# Patient Record
Sex: FEMALE | Race: WHITE | Employment: OTHER | URBAN - NONMETROPOLITAN AREA
[De-identification: names, ages, dates, MRNs, and addresses within clinical notes are randomized per-mention and may not be internally consistent; named-entity substitution may affect disease eponyms.]

---

## 2021-08-11 ENCOUNTER — HOSPITAL ENCOUNTER (EMERGENCY)
Age: 66
Discharge: HOME | End: 2021-08-11
Attending: INTERNAL MEDICINE
Payer: OTHER GOVERNMENT

## 2021-08-11 ENCOUNTER — APPOINTMENT (OUTPATIENT)
Dept: CT IMAGING | Age: 66
End: 2021-08-11
Attending: INTERNAL MEDICINE
Payer: OTHER GOVERNMENT

## 2021-08-11 ENCOUNTER — APPOINTMENT (OUTPATIENT)
Dept: GENERAL RADIOLOGY | Age: 66
End: 2021-08-11
Attending: INTERNAL MEDICINE
Payer: OTHER GOVERNMENT

## 2021-08-11 VITALS
TEMPERATURE: 98.4 F | RESPIRATION RATE: 20 BRPM | WEIGHT: 210 LBS | SYSTOLIC BLOOD PRESSURE: 145 MMHG | OXYGEN SATURATION: 96 % | HEIGHT: 67 IN | DIASTOLIC BLOOD PRESSURE: 71 MMHG | HEART RATE: 60 BPM | BODY MASS INDEX: 32.96 KG/M2

## 2021-08-11 DIAGNOSIS — U07.1 COVID-19 VIRUS RNA DETECTED: Primary | ICD-10-CM

## 2021-08-11 DIAGNOSIS — R91.8 LUNG MASS: ICD-10-CM

## 2021-08-11 LAB
ALBUMIN SERPL-MCNC: 4.4 G/DL (ref 3.5–4.7)
ALBUMIN/GLOB SERPL: 1.4 {RATIO}
ALP SERPL-CCNC: 72 U/L (ref 38–126)
ALT SERPL-CCNC: 31 U/L (ref 3–52)
ANION GAP SERPL CALC-SCNC: 13 MMOL/L
AST SERPL W P-5'-P-CCNC: 26 U/L (ref 14–74)
BASOPHILS # BLD: 0 K/UL (ref 0–0.1)
BASOPHILS NFR BLD: 0 % (ref 0–2)
BILIRUB DIRECT SERPL-MCNC: 0.2 MG/DL (ref 0–0.3)
BILIRUB SERPL-MCNC: 0.8 MG/DL (ref 0.2–1)
BUN SERPL-MCNC: 13 MG/DL (ref 9–21)
BUN/CREAT SERPL: 22
CA-I BLD-MCNC: 9.1 MG/DL (ref 8.5–10.5)
CHLORIDE SERPL-SCNC: 99 MMOL/L (ref 94–111)
CO2 SERPL-SCNC: 24 MMOL/L (ref 21–33)
COVID-19 RAPID TEST, COVR: DETECTED
CREAT SERPL-MCNC: 0.6 MG/DL (ref 0.7–1.2)
EOSINOPHIL # BLD: 0 K/UL (ref 0–0.4)
EOSINOPHIL NFR BLD: 0 % (ref 0–5)
ERYTHROCYTE [DISTWIDTH] IN BLOOD BY AUTOMATED COUNT: 12.7 % (ref 11.6–14.5)
FLUAV AG NPH QL IA: NEGATIVE
FLUBV AG NOSE QL IA: NEGATIVE
GLOBULIN SER CALC-MCNC: 3.2 G/DL
GLUCOSE SERPL-MCNC: 118 MG/DL (ref 70–110)
HCT VFR BLD AUTO: 44.4 % (ref 35–45)
HGB BLD-MCNC: 15.3 G/DL (ref 12–16)
IMM GRANULOCYTES # BLD AUTO: 0 K/UL
IMM GRANULOCYTES NFR BLD AUTO: 0 %
LACTATE SERPL-SCNC: 1 MMOL/L (ref 0.5–2)
LYMPHOCYTES # BLD: 1.8 K/UL (ref 0.9–3.6)
LYMPHOCYTES NFR BLD: 35 % (ref 21–52)
MCH RBC QN AUTO: 30.4 PG (ref 24–34)
MCHC RBC AUTO-ENTMCNC: 34.5 G/DL (ref 31–37)
MCV RBC AUTO: 88.1 FL (ref 74–97)
MONOCYTES # BLD: 0.3 K/UL (ref 0.05–1.2)
MONOCYTES NFR BLD: 7 % (ref 3–10)
NEUTS SEG # BLD: 2.9 K/UL (ref 1.8–8)
NEUTS SEG NFR BLD: 58 % (ref 40–73)
PLATELET # BLD AUTO: 177 K/UL (ref 135–420)
PMV BLD AUTO: 11.1 FL (ref 9.2–11.8)
POTASSIUM SERPL-SCNC: 3.2 MMOL/L (ref 3.2–5.1)
PROT SERPL-MCNC: 7.6 G/DL (ref 6.1–8.4)
RBC # BLD AUTO: 5.04 M/UL (ref 4.2–5.3)
SODIUM SERPL-SCNC: 136 MMOL/L (ref 135–145)
SPECIMEN SOURCE: ABNORMAL
WBC # BLD AUTO: 5.1 K/UL (ref 4.6–13.2)

## 2021-08-11 PROCEDURE — 71045 X-RAY EXAM CHEST 1 VIEW: CPT

## 2021-08-11 PROCEDURE — 87804 INFLUENZA ASSAY W/OPTIC: CPT

## 2021-08-11 PROCEDURE — 87635 SARS-COV-2 COVID-19 AMP PRB: CPT

## 2021-08-11 PROCEDURE — 83605 ASSAY OF LACTIC ACID: CPT

## 2021-08-11 PROCEDURE — 80048 BASIC METABOLIC PNL TOTAL CA: CPT

## 2021-08-11 PROCEDURE — 80076 HEPATIC FUNCTION PANEL: CPT

## 2021-08-11 PROCEDURE — 99284 EMERGENCY DEPT VISIT MOD MDM: CPT

## 2021-08-11 PROCEDURE — 71250 CT THORAX DX C-: CPT

## 2021-08-11 PROCEDURE — 85025 COMPLETE CBC W/AUTO DIFF WBC: CPT

## 2021-08-11 NOTE — DISCHARGE INSTRUCTIONS
FOLLOW COVID ISOLATION GUIDELINES  CALL DR CALHOUN FOR A VIRTUAL APPOINTMENT ASAP  RETURN TO THE ER FOR FEVER; SHORTNESS OF BREATH OR OTHER CONCERNING SYMPTOMS

## 2021-08-11 NOTE — ED PROVIDER NOTES
EMERGENCY DEPARTMENT HISTORY AND PHYSICAL EXAM      Date: 8/11/2021  Patient Name: Wendy Odonnell      History of Presenting Illness     Chief Complaint   Patient presents with    Flu Like Symptoms       History Provided By: Patient    HPI: Wendy Odonnell, 60 y.o. female with a past medical history significant No significant past medical history presents to the ED with cc of covid exposure. States that her son was diagnosed with covid on Saturday and she has intermittent fevers; cough; sob; n/v; myalgias; loss of taste/smell. There are no other complaints, changes, or physical findings at this time. PCP: Eli Galvan MD        Past History     Past Medical History:  Past Medical History:   Diagnosis Date    Arthritis        Past Surgical History:  Past Surgical History:   Procedure Laterality Date    HX HYSTERECTOMY         Family History:  History reviewed. No pertinent family history. Social History:  Social History     Tobacco Use    Smoking status: Current Every Day Smoker     Packs/day: 1.50    Smokeless tobacco: Never Used   Substance Use Topics    Alcohol use: Not Currently    Drug use: Never       Allergies: Allergies   Allergen Reactions    Iodine And Iodide Containing Products Hives         Review of Systems     Review of Systems   Constitutional: Positive for appetite change, chills and fever. HENT: Negative for trouble swallowing. Eyes: Negative for visual disturbance. Respiratory: Positive for cough and shortness of breath. Negative for wheezing and stridor. Cardiovascular: Negative for chest pain. Gastrointestinal: Positive for diarrhea and nausea. Negative for abdominal pain. Genitourinary: Negative for dysuria and flank pain. Musculoskeletal: Positive for myalgias. Negative for back pain. Skin: Negative for rash. Neurological: Negative for headaches. Psychiatric/Behavioral: Negative for confusion.        Physical Exam     Physical Exam  Vitals and nursing note reviewed. Constitutional:       General: She is not in acute distress. Appearance: She is well-developed. HENT:      Head: Normocephalic. Mouth/Throat:      Pharynx: No oropharyngeal exudate. Eyes:      General:         Right eye: No discharge. Pupils: Pupils are equal, round, and reactive to light. Neck:      Thyroid: No thyromegaly. Vascular: No JVD. Cardiovascular:      Rate and Rhythm: Normal rate and regular rhythm. Heart sounds: No murmur heard. No friction rub. No gallop. Pulmonary:      Effort: Pulmonary effort is normal. No respiratory distress. Breath sounds: Normal breath sounds. No wheezing or rales. Chest:      Chest wall: No tenderness. Abdominal:      General: Bowel sounds are normal. There is no distension. Palpations: Abdomen is soft. Tenderness: There is no abdominal tenderness. There is no guarding or rebound. Musculoskeletal:         General: Normal range of motion. Cervical back: Normal range of motion. Skin:     General: Skin is warm. Findings: No erythema. Neurological:      Mental Status: She is alert and oriented to person, place, and time.          Lab and Diagnostic Study Results     Labs -     Recent Results (from the past 12 hour(s))   INFLUENZA A & B AG (RAPID TEST)    Collection Time: 08/11/21 10:00 AM   Result Value Ref Range    Influenza A Antigen Negative Negative      Influenza B Antigen Negative Negative     METABOLIC PANEL, BASIC    Collection Time: 08/11/21 10:15 AM   Result Value Ref Range    Sodium 136 135 - 145 mmol/L    Potassium 3.2 3.2 - 5.1 mmol/L    Chloride 99 94 - 111 mmol/L    CO2 24 21 - 33 mmol/L    Anion gap 13 mmol/L    Glucose 118 (H) 70 - 110 mg/dL    BUN 13 9 - 21 mg/dL    Creatinine 0.60 (L) 0.70 - 1.20 mg/dL    BUN/Creatinine ratio 22      GFR est AA >60 ml/min/1.73m2    GFR est non-AA >60 ml/min/1.73m2    Calcium 9.1 8.5 - 10.5 mg/dL   CBC WITH AUTOMATED DIFF Collection Time: 08/11/21 10:15 AM   Result Value Ref Range    WBC 5.1 4.6 - 13.2 K/uL    RBC 5.04 4.20 - 5.30 M/uL    HGB 15.3 12.0 - 16.0 g/dL    HCT 44.4 35.0 - 45.0 %    MCV 88.1 74.0 - 97.0 FL    MCH 30.4 24.0 - 34.0 PG    MCHC 34.5 31.0 - 37.0 g/dL    RDW 12.7 11.6 - 14.5 %    PLATELET 366 285 - 334 K/uL    MPV 11.1 9.2 - 11.8 FL    NEUTROPHILS 58 40 - 73 %    LYMPHOCYTES 35 21 - 52 %    MONOCYTES 7 3 - 10 %    EOSINOPHILS 0 0 - 5 %    BASOPHILS 0 0 - 2 %    IMMATURE GRANULOCYTES 0 %    ABS. NEUTROPHILS 2.9 1.8 - 8.0 K/UL    ABS. LYMPHOCYTES 1.8 0.9 - 3.6 K/UL    ABS. MONOCYTES 0.3 0.05 - 1.2 K/UL    ABS. EOSINOPHILS 0.0 0.0 - 0.4 K/UL    ABS. BASOPHILS 0.0 0.0 - 0.1 K/UL    ABS. IMM. GRANS. 0.0 K/UL   HEPATIC FUNCTION PANEL    Collection Time: 08/11/21 10:15 AM   Result Value Ref Range    Protein, total 7.6 6.1 - 8.4 g/dL    Albumin 4.4 3.5 - 4.7 g/dL    Globulin 3.2 g/dL    A-G Ratio 1.4      Bilirubin, total 0.8 0.2 - 1.0 mg/dL    Bilirubin, direct 0.2 0.0 - 0.3 mg/dL    Alk. phosphatase 72 38 - 126 U/L    AST (SGOT) 26 14 - 74 U/L    ALT (SGPT) 31 3 - 52 U/L   LACTIC ACID    Collection Time: 08/11/21 10:15 AM   Result Value Ref Range    Lactic acid 1.0 0.5 - 2.0 mmol/L   COVID-19 RAPID TEST    Collection Time: 08/11/21 10:52 AM   Result Value Ref Range    Specimen source Nasopharyngeal      COVID-19 rapid test DETECTED (A) Not Detected         Radiologic Studies -   [unfilled]  CT Results  (Last 48 hours)               08/11/21 1233  CT CHEST WO CONT Final result    Impression:          1. Right superhilar mass measuring 4.1 x 4.4 x 5.5 cm in size highly concerning   for potential bronchogenic neoplasm.      > Invasion of the adjacent medial mediastinal pleura is noted with precarinal   adenopathy.      > No pleural effusion. 2. Diffuse mosaic attenuation of the pulmonary parenchyma and mild bronchial   wall thickening; findings which can be seen in the context of small airways   disease. 3. Small (3 mm) subpleural right lower lobe pulmonary nodule, attention on   follow-up imaging recommended.       ========       Fleischner Society Pulmonary Nodule Guidelines (revised 2017): Solid nodule >8 mm: Consider CT in 3 months, PET-CT, or tissue sampling. Pulmonary or Thoracic surgery consultation is also recommended for management. Narrative:  EXAM: CT Chest        INDICATION: Shortness of breath, abnormal chest radiograph. COMPARISON: Chest radiographs 8/11/2011. TECHNIQUE: Axial CT imaging from the thoracic inlet through the diaphragm   Without intravenous contrast. Multiplanar reformations were generated. One or more dose reduction techniques were used on this CT: automated exposure   control, adjustment of the mAs and/or kVp according to patient size, and   iterative reconstruction techniques. The specific techniques used on this CT   exam have been documented in the patient's electronic medical record. Digital   Imaging and Communications in Medicine (DICOM) format image data are available   to nonaffiliated external healthcare facilities or entities on a secure, media   free, reciprocally searchable basis with patient authorization for at least a   12-month period after this study. _______________       FINDINGS:       LUNGS:      > Within the paramedian right upper lobe, there is a tissue mass which is   estimated to measure approximately 4.1 x 4.4 by 5.5 cm in size (greatest   diameter craniocaudal). This mass is noted to invade the adjacent medial   mediastinal pleura. Localized spiculations noted across the anterior and   superior pleural surfaces with faint surrounding groundglass opacity. > There is mild and diffuse mosaic attenuation of the pulmonary parenchyma   noted.      > Scattered areas of subsegmental atelectasis noted at the lung bases.       > 3 mm subpleural right lower lobe pulmonary nodule (image 218)       PLEURA: No pneumothorax or pleural effusion. AIRWAY: Diffuse bronchial wall thickening. MEDIASTINUM: Included thyroid gland is unremarkable. Thoracic aorta normal in   course/caliber. Normal cardiac size. No pericardial effusion. LYMPH NODES: No supraclavicular or axillary lymph node enlargement. Enlarged   precarinal lymph node (image 40) measuring approximately 1.4 cm in short axis   dimension. No discretely enlarged hilar lymph nodes. Several calcified left   hilar lymph nodes in keeping with healed granulomatous disease. UPPER ABDOMEN: Scattered splenic granulomata, otherwise unremarkable. OSSEOUS STRUCTURES: No acute or aggressive appearing osseous abnormality. OTHER:       _______________               CXR Results  (Last 48 hours)               08/11/21 1040  XR CHEST PORT Final result    Impression:      Right suprahilar paramediastinal masslike consolidation, concerning for possible   underlying mass. Recommend further evaluation with dedicated CT chest.       Narrative:  EXAM: XR CHEST PORT       CLINICAL INDICATION/HISTORY: covid exposure   -Additional: None       COMPARISON: 12/14/2015       TECHNIQUE: Portable frontal view of the chest       _______________       FINDINGS:       SUPPORT DEVICES: None. HEART AND MEDIASTINUM: Cardiomediastinal silhouette within normal limits. LUNGS AND PLEURAL SPACES: Right suprahilar paramediastinal masslike   consolidation. No large effusion or pneumothorax.       _______________                 Medical Decision Making and ED Course   - I am the first and primary provider for this patient AND AM THE PRIMARY PROVIDER OF RECORD. - I reviewed the vital signs, available nursing notes, past medical history, past surgical history, family history and social history. - Initial assessment performed. The patients presenting problems have been discussed, and the staff are in agreement with the care plan formulated and outlined with them.   I have encouraged them to ask questions as they arise throughout their visit. Vital Signs-Reviewed the patient's vital signs. Patient Vitals for the past 12 hrs:   Temp Pulse Resp BP SpO2   08/11/21 1234  60 20 (!) 145/71 96 %   08/11/21 1043  67 20 (!) 148/55 96 %   08/11/21 1002 98.4 °F (36.9 °C) 77 20 (!) 149/59 97 %       Records Reviewed: Nursing Notes    ED Course:   Has covid positive and new right suprahilar mass. Normal O2 sat. VSS. Case discussed with Dr Colin Bowers; he has no openings but states to try to give pt an appointment with dr Werner Maldonado since he has not seen her. Provider Notes (Medical Decision Making):           Consultations:       Consultations: DR Bobby Iverson        Procedures and Critical Care         Disposition     Disposition:     *Remove if not discharged  DISCHARGE PLAN:  1. There are no discharge medications for this patient. 2.   Follow-up Information     Follow up With Specialties Details Why Contact Info    Lilo Jiménez MD Family Medicine Schedule an appointment as soon as possible for a visit in 1 week  [address]  [facility]  [phone]          3. Return to ED if worse   4. There are no discharge medications for this patient. Diagnosis     Clinical Impression:   1. COVID-19 virus RNA detected    2. Lung mass        Attestations:    Ceci Bright MD    Please note that this dictation was completed with [employer], the computer voice recognition software. Quite often unanticipated grammatical, syntax, homophones, and other interpretive errors are inadvertently transcribed by the computer software. Please disregard these errors. Please excuse any errors that have escaped final proofreading. Thank you.

## 2021-08-11 NOTE — ED TRIAGE NOTES
Patient states she thinks she has been exposed to Covid. C/O exertional dyspnea, vomiting, cough, loss of taste and smell. Patient has not been vaccinated.

## 2021-08-12 ENCOUNTER — PATIENT OUTREACH (OUTPATIENT)
Dept: CASE MANAGEMENT | Age: 66
End: 2021-08-12

## 2021-08-13 ENCOUNTER — HOSPITAL ENCOUNTER (EMERGENCY)
Age: 66
Discharge: HOME | End: 2021-08-13
Attending: FAMILY MEDICINE
Payer: OTHER GOVERNMENT

## 2021-08-13 ENCOUNTER — PATIENT OUTREACH (OUTPATIENT)
Dept: CASE MANAGEMENT | Age: 66
End: 2021-08-13

## 2021-08-13 VITALS
HEART RATE: 88 BPM | SYSTOLIC BLOOD PRESSURE: 142 MMHG | TEMPERATURE: 98.3 F | BODY MASS INDEX: 32.96 KG/M2 | OXYGEN SATURATION: 96 % | DIASTOLIC BLOOD PRESSURE: 76 MMHG | HEIGHT: 67 IN | WEIGHT: 210 LBS | RESPIRATION RATE: 20 BRPM

## 2021-08-13 DIAGNOSIS — M54.9 UPPER BACK PAIN ON RIGHT SIDE: Primary | ICD-10-CM

## 2021-08-13 DIAGNOSIS — U07.1 COVID-19: ICD-10-CM

## 2021-08-13 PROCEDURE — 74011250637 HC RX REV CODE- 250/637: Performed by: FAMILY MEDICINE

## 2021-08-13 PROCEDURE — 99283 EMERGENCY DEPT VISIT LOW MDM: CPT

## 2021-08-13 RX ORDER — KETOROLAC TROMETHAMINE 10 MG/1
10 TABLET, FILM COATED ORAL
Qty: 20 TABLET | Refills: 0 | Status: SHIPPED | OUTPATIENT
Start: 2021-08-13 | End: 2021-09-20 | Stop reason: ALTCHOICE

## 2021-08-13 RX ORDER — KETOROLAC TROMETHAMINE 10 MG/1
10 TABLET, FILM COATED ORAL ONCE
Status: COMPLETED | OUTPATIENT
Start: 2021-08-13 | End: 2021-08-13

## 2021-08-13 RX ORDER — CYCLOBENZAPRINE HCL 10 MG
10 TABLET ORAL
Qty: 20 TABLET | Refills: 0 | Status: SHIPPED | OUTPATIENT
Start: 2021-08-13 | End: 2021-09-20 | Stop reason: ALTCHOICE

## 2021-08-13 RX ADMIN — KETOROLAC TROMETHAMINE 10 MG: 10 TABLET, FILM COATED ORAL at 09:41

## 2021-08-13 NOTE — PROGRESS NOTES
Patient contacted regarding COVID-19 diagnosis. Discussed COVID-19 related testing which was available at this time. Test results were positive. Patient informed of results, if available? yes. Ambulatory Care Manager contacted the patient by telephone to perform post discharge assessment. Call within 2 business days of discharge: Yes Verified name and  with patient as identifiers. Provided introduction to self, and explanation of the CTN/ACM role, and reason for call due to risk factors for infection and/or exposure to COVID-19. Symptoms reviewed with patient who verbalized the following symptoms: cough, loss or taste or smell and diarrhea      Due to new onset of symptoms encounter was not routed to provider for escalation. Discussed follow-up appointments. If no appointment was previously scheduled, appointment scheduling offered:  yes. [facility] follow up appointment(s):   Future Appointments   Date Time Provider Yessy Fatima   9/15/2021  9:30 AM Martina Albarran MD [facility]     Non-[facility] follow up appointment(s): n/a    Interventions to address risk factors: Education of patient/family/caregiver/guardian to support self-management-. and Assessment and support for treatment adherence and medication management-. Advance Care Planning:   Does patient have an Advance Directive: not on file. Educated patient about risk for severe COVID-19 due to risk factors according to CDC guidelines. ACM reviewed discharge instructions, medical action plan and red flag symptoms with the patient who verbalized understanding. Discussed COVID vaccination status: no. Education provided on COVID-19 vaccination as appropriate. Discussed exposure protocols and quarantine with CDC Guidelines. Patient was given an opportunity to verbalize any questions and concerns and agrees to contact ACM or health care provider for questions related to their healthcare.     Reviewed and educated patient on any new and changed medications related to discharge diagnosis     Was patient discharged with a pulse oximeter? no Discussed and confirmed pulse oximeter discharge instructions and when to notify provider or seek emergency care. ACM provided contact information. Plan for follow-up call in 3-5 days based on severity of symptoms and risk factors. ACM offered to schedule PCP follow up for patient but patient declined stating she would schedule on her own.

## 2021-08-13 NOTE — ED PROVIDER NOTES
EMERGENCY DEPARTMENT HISTORY AND PHYSICAL EXAM      Date: 8/13/2021  Patient Name: Kelley Bernard    History of Presenting Illness     Chief Complaint   Patient presents with    Shoulder Pain       History Provided By: Patient    HPI: Kelley Bernard, 60 y.o. female with a past medical history significant No significant past medical history presents to the ED with cc of upper back pain. Patient tested positive for Covid last week. She is also told that she has a lung mass. She is had symptoms of her upper back pain for the last couple weeks but got worse in the past 2 days. Is worse with movement. She been taken over-the-counter meds without relief. She says the pain is a 12 out of 10. She has no radiation of pain. There is no numbness or tingling in her arm. There are no other complaints, changes, or physical findings at this time. PCP: Martina Albarran MD    No current facility-administered medications on file prior to encounter. No current outpatient medications on file prior to encounter. Past History     Past Medical History:  Past Medical History:   Diagnosis Date    Arthritis        Past Surgical History:  Past Surgical History:   Procedure Laterality Date    HX HYSTERECTOMY         Family History:  No family history on file. Social History:  Social History     Tobacco Use    Smoking status: Current Every Day Smoker     Packs/day: 1.50    Smokeless tobacco: Never Used   Substance Use Topics    Alcohol use: Not Currently    Drug use: Never       Allergies: Allergies   Allergen Reactions    Iodine And Iodide Containing Products Hives         Review of Systems     Review of Systems   Constitutional: Negative for fatigue and fever. HENT: Negative for rhinorrhea and sore throat. Respiratory: Negative for cough and shortness of breath. Cardiovascular: Negative for chest pain and palpitations.    Gastrointestinal: Negative for abdominal pain, diarrhea, nausea and vomiting. Genitourinary: Negative for difficulty urinating and dysuria. Musculoskeletal: Positive for back pain. Negative for arthralgias and myalgias. Skin: Negative for color change and rash. Neurological: Negative for light-headedness and headaches. Physical Exam     Physical Exam  Vitals and nursing note reviewed. Constitutional:       General: She is awake. She is not in acute distress. Appearance: Normal appearance. She is well-developed and normal weight. She is not ill-appearing, toxic-appearing or diaphoretic. Interventions: Face mask in place. HENT:      Head: Normocephalic and atraumatic. Eyes:      Conjunctiva/sclera: Conjunctivae normal.      Pupils: Pupils are equal, round, and reactive to light. Cardiovascular:      Rate and Rhythm: Normal rate and regular rhythm. Pulses: Normal pulses. Heart sounds: Normal heart sounds. Pulmonary:      Effort: Pulmonary effort is normal.      Breath sounds: Normal breath sounds. Abdominal:      General: Abdomen is flat. Palpations: Abdomen is soft. Tenderness: There is no abdominal tenderness. Musculoskeletal:        Arms:       Cervical back: Normal range of motion and neck supple. Comments: Tenderness and spasm   Skin:     General: Skin is warm and dry. Neurological:      General: No focal deficit present. Mental Status: She is alert and oriented to person, place, and time. GCS: GCS eye subscore is 4. GCS verbal subscore is 5. GCS motor subscore is 6. Psychiatric:         Mood and Affect: Mood and affect normal.         Behavior: Behavior normal. Behavior is cooperative. Thought Content: Thought content normal.         Lab and Diagnostic Study Results     Labs -   No results found for this or any previous visit (from the past 12 hour(s)).     Radiologic Studies -   @lastxrresult@  CT Results  (Last 48 hours)               08/11/21 1233  CT CHEST WO CONT Final result    Impression: 1. Right superhilar mass measuring 4.1 x 4.4 x 5.5 cm in size highly concerning   for potential bronchogenic neoplasm.      > Invasion of the adjacent medial mediastinal pleura is noted with precarinal   adenopathy.      > No pleural effusion. 2. Diffuse mosaic attenuation of the pulmonary parenchyma and mild bronchial   wall thickening; findings which can be seen in the context of small airways   disease. 3. Small (3 mm) subpleural right lower lobe pulmonary nodule, attention on   follow-up imaging recommended.       ========       Fleischner Society Pulmonary Nodule Guidelines (revised 2017): Solid nodule >8 mm: Consider CT in 3 months, PET-CT, or tissue sampling. Pulmonary or Thoracic surgery consultation is also recommended for management. Narrative:  EXAM: CT Chest        INDICATION: Shortness of breath, abnormal chest radiograph. COMPARISON: Chest radiographs 8/11/2011. TECHNIQUE: Axial CT imaging from the thoracic inlet through the diaphragm   Without intravenous contrast. Multiplanar reformations were generated. One or more dose reduction techniques were used on this CT: automated exposure   control, adjustment of the mAs and/or kVp according to patient size, and   iterative reconstruction techniques. The specific techniques used on this CT   exam have been documented in the patient's electronic medical record. Digital   Imaging and Communications in Medicine (DICOM) format image data are available   to nonaffiliated external healthcare facilities or entities on a secure, media   free, reciprocally searchable basis with patient authorization for at least a   12-month period after this study. _______________       FINDINGS:       LUNGS:      > Within the paramedian right upper lobe, there is a tissue mass which is   estimated to measure approximately 4.1 x 4.4 by 5.5 cm in size (greatest   diameter craniocaudal).  This mass is noted to invade the adjacent medial   mediastinal pleura. Localized spiculations noted across the anterior and   superior pleural surfaces with faint surrounding groundglass opacity. > There is mild and diffuse mosaic attenuation of the pulmonary parenchyma   noted.      > Scattered areas of subsegmental atelectasis noted at the lung bases. > 3 mm subpleural right lower lobe pulmonary nodule (image 218)       PLEURA: No pneumothorax or pleural effusion. AIRWAY: Diffuse bronchial wall thickening. MEDIASTINUM: Included thyroid gland is unremarkable. Thoracic aorta normal in   course/caliber. Normal cardiac size. No pericardial effusion. LYMPH NODES: No supraclavicular or axillary lymph node enlargement. Enlarged   precarinal lymph node (image 40) measuring approximately 1.4 cm in short axis   dimension. No discretely enlarged hilar lymph nodes. Several calcified left   hilar lymph nodes in keeping with healed granulomatous disease. UPPER ABDOMEN: Scattered splenic granulomata, otherwise unremarkable. OSSEOUS STRUCTURES: No acute or aggressive appearing osseous abnormality. OTHER:       _______________               CXR Results  (Last 48 hours)               08/11/21 1040  XR CHEST PORT Final result    Impression:      Right suprahilar paramediastinal masslike consolidation, concerning for possible   underlying mass. Recommend further evaluation with dedicated CT chest.       Narrative:  EXAM: XR CHEST PORT       CLINICAL INDICATION/HISTORY: covid exposure   -Additional: None       COMPARISON: 12/14/2015       TECHNIQUE: Portable frontal view of the chest       _______________       FINDINGS:       SUPPORT DEVICES: None. HEART AND MEDIASTINUM: Cardiomediastinal silhouette within normal limits. LUNGS AND PLEURAL SPACES: Right suprahilar paramediastinal masslike   consolidation.  No large effusion or pneumothorax.       _______________                   Medical Decision Making   - I am the first provider for this patient. - I reviewed the vital signs, available nursing notes, past medical history, past surgical history, family history and social history. - Initial assessment performed. The patients presenting problems have been discussed, and they are in agreement with the care plan formulated and outlined with them. I have encouraged them to ask questions as they arise throughout their visit. Vital Signs-Reviewed the patient's vital signs. Patient Vitals for the past 12 hrs:   Temp Pulse Resp BP SpO2   08/13/21 0901 98.3 °F (36.8 °C) 88 20 (!) 142/76 96 %       Records Reviewed: Nursing Notes        ED Course:          Provider Notes (Medical Decision Making):     Presents with upper back discomfort. She is tender over the trapezius on the right side. We will treat with muscle relaxers and NSAIDs. MDM       Procedures   Medical Decision Makingedical Decision Making  Performed by: Jay Salamanca MD  PROCEDURES:  Procedures       Disposition   Disposition:   Discharged    DISCHARGE PLAN:  1. Current Discharge Medication List      START taking these medications    Details   ketorolac (TORADOL) 10 mg tablet Take 1 Tablet by mouth every six (6) hours as needed for Pain. Qty: 20 Tablet, Refills: 0      cyclobenzaprine (FLEXERIL) 10 mg tablet Take 1 Tablet by mouth three (3) times daily (with meals). Qty: 20 Tablet, Refills: 0           2. Follow-up Information     Follow up With Specialties Details Why Kirill Valadez., MD Internal Medicine In 1 week  [address] [address]  [phone]          3. Return to ED if worse   4. Current Discharge Medication List      START taking these medications    Details   ketorolac (TORADOL) 10 mg tablet Take 1 Tablet by mouth every six (6) hours as needed for Pain.   Qty: 20 Tablet, Refills: 0  Start date: 8/13/2021      cyclobenzaprine (FLEXERIL) 10 mg tablet Take 1 Tablet by mouth three (3) times daily (with meals). Qty: 20 Tablet, Refills: 0  Start date: 8/13/2021               Diagnosis     Clinical Impression:   1. Upper back pain on right side    2. COVID-19        Attestations:    Reji Ortiz MD    Please note that this dictation was completed with [employer], the computer voice recognition software. Quite often unanticipated grammatical, syntax, homophones, and other interpretive errors are inadvertently transcribed by the computer software. Please disregard these errors. Please excuse any errors that have escaped final proofreading. Thank you.

## 2021-08-13 NOTE — ED TRIAGE NOTES
Pt states she has had shoulder pain x 2 weeks, but it has gotten worse over the past 2 days. Pt denies injury to the area. Pt states she takes tylenol but \"it comes right back\"  Pt has multiple other complaints r/t her being positive for COVID, but states she is at the ED to be seen for her shoulder. Pt states she had a chest x ray and a chest CT 2 days ago. ......... Janie Godoy

## 2021-08-23 ENCOUNTER — TELEPHONE (OUTPATIENT)
Dept: INTERNAL MEDICINE CLINIC | Age: 66
End: 2021-08-23

## 2021-08-23 ENCOUNTER — PATIENT OUTREACH (OUTPATIENT)
Dept: CASE MANAGEMENT | Age: 66
End: 2021-08-23

## 2021-08-23 NOTE — PROGRESS NOTES
Patient contacted regarding COVID-19 diagnosis. Discussed COVID-19 related testing which was available at this time. Test results were positive. Patient informed of results, if available? yes      Ambulatory Care Manager contacted the patient by telephone to perform follow-up assessment. Verified name and  with patient as identifiers. Patient has following risk factors of: COVID positive. Symptoms reviewed with patient who verbalized the following symptoms: fatigue, shortness of breath and diarrhea. Due to worsening symptoms encounter was routed to provider for escalation. Interventions to address risk factors: Education of patient/family/caregiver/guardian to support self-management-. and Assessment and support for treatment adherence and medication management-.    Educated patient about risk for severe COVID-19 due to risk factors according to CDC guidelines. ACM reviewed discharge instructions, medical action plan and red flag symptoms with the patient who verbalized understanding. Discussed COVID vaccination status: no. Education provided on COVID-19 vaccination as appropriate. Discussed exposure protocols and quarantine with CDC Guidelines. Patient was given an opportunity to verbalize any questions and concerns and agrees to contact ACM or health care provider for questions related to their healthcare. Reviewed and educated patient on any new and changed medications related to discharge diagnosis     Was patient discharged with a pulse oximeter? no Discussed and confirmed pulse oximeter discharge instructions and when to notify provider or seek emergency care. ACM provided contact information. Plan for follow-up call in 3-5 days based on severity of symptoms and risk factors. ACM spoke with Nicole Dawn at [facility] office. Patient scheduled to see PCP on 21. Patient advised to return to ED if sx worsen. She verbalized understanding.

## 2021-08-26 ENCOUNTER — OFFICE VISIT (OUTPATIENT)
Dept: INTERNAL MEDICINE CLINIC | Age: 66
End: 2021-08-26

## 2021-08-26 VITALS
RESPIRATION RATE: 20 BRPM | HEIGHT: 67 IN | SYSTOLIC BLOOD PRESSURE: 125 MMHG | HEART RATE: 85 BPM | DIASTOLIC BLOOD PRESSURE: 83 MMHG | WEIGHT: 202.8 LBS | TEMPERATURE: 97.7 F | BODY MASS INDEX: 31.83 KG/M2 | OXYGEN SATURATION: 93 %

## 2021-08-26 DIAGNOSIS — S46.811A STRAIN OF RIGHT TRAPEZIUS MUSCLE, INITIAL ENCOUNTER: Primary | ICD-10-CM

## 2021-08-26 DIAGNOSIS — J44.9 CHRONIC OBSTRUCTIVE PULMONARY DISEASE, UNSPECIFIED COPD TYPE (HCC): ICD-10-CM

## 2021-08-26 DIAGNOSIS — Z12.31 SCREENING MAMMOGRAM, ENCOUNTER FOR: ICD-10-CM

## 2021-08-26 DIAGNOSIS — U07.1 COVID-19: ICD-10-CM

## 2021-08-26 DIAGNOSIS — Z09 HOSPITAL DISCHARGE FOLLOW-UP: ICD-10-CM

## 2021-08-26 DIAGNOSIS — R91.1 LESION OF RIGHT LUNG: ICD-10-CM

## 2021-08-26 PROCEDURE — 99204 OFFICE O/P NEW MOD 45 MIN: CPT | Performed by: INTERNAL MEDICINE

## 2021-08-26 RX ORDER — ALBUTEROL SULFATE 90 UG/1
1 AEROSOL, METERED RESPIRATORY (INHALATION)
Qty: 1 INHALER | Refills: 0 | Status: SHIPPED | OUTPATIENT
Start: 2021-08-26 | End: 2021-09-20 | Stop reason: SDUPTHER

## 2021-08-26 RX ORDER — METHOCARBAMOL 500 MG/1
500 TABLET, FILM COATED ORAL 4 TIMES DAILY
Qty: 30 TABLET | Refills: 2 | Status: SHIPPED | OUTPATIENT
Start: 2021-08-26 | End: 2022-10-21

## 2021-08-26 NOTE — PROGRESS NOTES
Right neck and shoulder pain, went to Er 2 weeks ago at [facility]. States she tested positive for Covid. Would like refills on Toradol and flexaril. Michael Bright presents today for   Chief Complaint   Patient presents with   [facility] Follow Up     ER follow up       Is someone accompanying this pt? Yes sister-in-law  Is the patient using any DME equipment during OV? no    Depression Screening:  3 most recent PHQ Screens 8/26/2021   Little interest or pleasure in doing things Not at all   Feeling down, depressed, irritable, or hopeless Not at all   Total Score PHQ 2 0       Learning Assessment:  No flowsheet data found. Fall Risk  No flowsheet data found. ADL  ADL Assessment 8/26/2021   Feeding yourself No Help Needed   Getting from bed to chair No Help Needed   Getting dressed No Help Needed   Bathing or showering No Help Needed   Walk across the room (includes cane/walker) No Help Needed   Using the telphone No Help Needed   Taking your medications No Help Needed   Preparing meals No Help Needed   Managing money (expenses/bills) No Help Needed   Moderately strenuous housework (laundry) No Help Needed   Shopping for personal items (toiletries/medicines) No Help Needed   Shopping for groceries No Help Needed   Driving No Help Needed   Climbing a flight of stairs No Help Needed   Getting to places beyond walking distances No Help Needed       Health Maintenance reviewed and discussed and ordered per Provider. Health Maintenance Due   Topic Date Due    Hepatitis C Screening  Never done    Pneumococcal 0-64 years (1 of 2 - PPSV23) Never done    COVID-19 Vaccine (1) Never done    DTaP/Tdap/Td series (1 - Tdap) Never done    PAP AKA CERVICAL CYTOLOGY  Never done    Lipid Screen  Never done    Colorectal Cancer Screening Combo  Never done    Shingrix Vaccine Age 50> (1 of 2) Never done    Breast Cancer Screen Mammogram  Never done    Bone Densitometry (Dexa) Screening  09/09/2021   . Coordination of Care:  1. Have you been to the ER, urgent care clinic since your last visit? Hospitalized since your last visit? yes    2. Have you seen or consulted any other health care providers outside of the [address] [place] [address] since your last visit? Include any pap smears or colon screening.  no

## 2021-08-26 NOTE — PROGRESS NOTES
1. Strain of right trapezius muscle, initial encounter  A new problem. Start Robaxin 504 times daily  - methocarbamoL (ROBAXIN) 500 mg tablet; Take 1 Tablet by mouth four (4) times daily. Dispense: 30 Tablet; Refill: 2    2. Hospital discharge follow-up  Reconciled her medications    3. Screening mammogram, encounter for  Reorder this at her next visit    4. Lesion of right lung  Is a new problem Mercadotiffanie Moralesuts and I had a good conversation about the CT findings. She clearly has what looks like a bronchogenic carcinoma with significant spread. I have placed a consult for pulmonary disease so that she can have a [facility]  - REFERRAL TO PULMONARY DISEASE    5. Chronic obstructive pulmonary disease, unspecified COPD type ([address]  sHe has bilateral wheezing consistent with COPD. For now I am going to start her on albuterol.  - albuterol (PROVENTIL HFA, VENTOLIN HFA, PROAIR HFA) 90 mcg/actuation inhaler; Take 1 Puff by inhalation every six (6) hours as needed for Wheezing. Dispense: 1 Inhaler; Refill: 0    6. Covid 19  The patient has recovered. Items reviewed  8/11/21 Dr. Jourdan Forde progress note for the er reivewd  8/13/21 Dr. Stephanie White progress note reviewed  CT from 8/11/21 personally reviewed by me  Labs noted below  Results for Cherie Barba (MRN [id number]) as of 8/26/2021 16:41   Ref.  Range 8/11/2021 10:00 8/11/2021 10:15 8/11/2021 10:40 8/11/2021 10:52 8/11/2021 12:33   WBC Latest Ref Range: 4.6 - 13.2 K/uL  5.1      RBC Latest Ref Range: 4.20 - 5.30 M/uL  5.04      HGB Latest Ref Range: 12.0 - 16.0 g/dL  15.3      HCT Latest Ref Range: 35.0 - 45.0 %  44.4      MCV Latest Ref Range: 74.0 - 97.0 FL  88.1      MCH Latest Ref Range: 24.0 - 34.0 PG  30.4      MCHC Latest Ref Range: 31.0 - 37.0 g/dL  34.5      RDW Latest Ref Range: 11.6 - 14.5 %  12.7      PLATELET Latest Ref Range: 135 - 420 K/uL  177      MPV Latest Ref Range: 9.2 - 11.8 FL  11.1      NEUTROPHILS Latest Ref Range: 40 - 73 %  58      LYMPHOCYTES Latest Ref Range: 21 - 52 %  35      MONOCYTES Latest Ref Range: 3 - 10 %  7      EOSINOPHILS Latest Ref Range: 0 - 5 %  0      BASOPHILS Latest Ref Range: 0 - 2 %  0      IMMATURE GRANULOCYTES Latest Units: %  0      ABS. NEUTROPHILS Latest Ref Range: 1.8 - 8.0 K/UL  2.9      ABS. IMM. GRANS. Latest Units: K/UL  0.0      ABS. LYMPHOCYTES Latest Ref Range: 0.9 - 3.6 K/UL  1.8      ABS. MONOCYTES Latest Ref Range: 0.05 - 1.2 K/UL  0.3      ABS. EOSINOPHILS Latest Ref Range: 0.0 - 0.4 K/UL  0.0      ABS. BASOPHILS Latest Ref Range: 0.0 - 0.1 K/UL  0.0      Sodium Latest Ref Range: 135 - 145 mmol/L  136      Potassium Latest Ref Range: 3.2 - 5.1 mmol/L  3.2      Chloride Latest Ref Range: 94 - 111 mmol/L  99      CO2 Latest Ref Range: 21 - 33 mmol/L  24      Anion gap Latest Units: mmol/L  13      Glucose Latest Ref Range: 70 - 110 mg/dL  118 (H)      BUN Latest Ref Range: 9 - 21 mg/dL  13      Creatinine Latest Ref Range: 0.70 - 1.20 mg/dL  0.60 (L)      BUN/Creatinine ratio Latest Units:    22      Calcium Latest Ref Range: 8.5 - 10.5 mg/dL  9.1      GFR est non-AA Latest Units: ml/min/1.73m2  >60      GFR est AA Latest Units: ml/min/1.73m2  >60      Bilirubin, total Latest Ref Range: 0.2 - 1.0 mg/dL  0.8      Bilirubin, direct Latest Ref Range: 0.0 - 0.3 mg/dL  0.2      Protein, total Latest Ref Range: 6.1 - 8.4 g/dL  7.6      Albumin Latest Ref Range: 3.5 - 4.7 g/dL  4.4      Globulin Latest Units: g/dL  3.2      A-G Ratio Latest Units:    1.4      ALT Latest Ref Range: 3 - 52 U/L  31      AST Latest Ref Range: 14 - 74 U/L  26      Alk.  phosphatase Latest Ref Range: 38 - 126 U/L  72      Lactic acid Latest Ref Range: 0.5 - 2.0 mmol/L  1.0      Influenza A Antigen Latest Ref Range: Negative   Negative       Influenza B Antigen Latest Ref Range: Negative   Negative       INFLUENZA A & B AG (RAPID TEST) Unknown Rpt       COVID-19 RAPID TEST Unknown    Rpt (A)    CT CHEST WO CONT Unknown     Rpt   XR CHEST PORT Unknown Rpt       Chief Complaint   Patient presents with   [facility] Follow Up     ER follow up        HPI   This is a very pleasant 79-year-old female who presented to the emergency room approximately 2-1/2 weeks ago with cough and shortness of breath. While there she was diagnosed with Covid. She returned to the ED complaining of right-sided shoulder pain. Prior to this she had had a CT which showed a significant lung lesion on the right side consistent with bronchogenic carcinoma. She presents as a new patient today. She reports she has not been having any night sweats fevers or chills but states she does have a cough. She has lost about 15 pounds since jose alfredo Covid though. Otherwise she reports she has not really seen many doctors. She has no other complaints today she is just very scared about her diagnosis  There is no problem list on file for this patient. Current Outpatient Medications on File Prior to Visit   Medication Sig Dispense Refill    ketorolac (TORADOL) 10 mg tablet Take 1 Tablet by mouth every six (6) hours as needed for Pain. 20 Tablet 0    cyclobenzaprine (FLEXERIL) 10 mg tablet Take 1 Tablet by mouth three (3) times daily (with meals). 20 Tablet 0     No current facility-administered medications on file prior to visit. ROS  - GEN: + weight gain, no fevers or chills  - HEENT: no vision changes, no tinnitus, no sore throat  - CV: no cp, palpitations or edema  - RESP:+ sob, +cough  - ABD: no n/v/d, no blood in stool  - : no dysuria or changes in freq. -  Visit Vitals  /83   Pulse 85   Temp 97.7 °F (36.5 °C)   Resp 20   Ht 5' 7\" (1.702 m)   Wt 202 lb 12.8 oz (92 kg)   SpO2 93%   BMI 31.76 kg/m²           Physical Exam  Constitutional:       Appearance: Normal appearance. obese. NAD and pleasant  HENT:      Head: Normocephalic.       Nose: Nose normal.      Mouth/Throat:      Mouth: Mucous membranes are moist. Throat not inflammed  Eyes:      Extraocular Movements: Extraocular movements intact. Conjunctiva/sclera: Conjunctivae normal. Sclera anicteric     Pupils: Pupils are equal, round, and reactive to light. Cardiovascular:      Rate and Rhythm: Normal rate and regular rhythm. Pulses: Normal pulses. Pulmonary:      Effort: No respiratory distress. Breath sounds: bilateral exp wheezes   Abdominal:      General: There is no distension. NT, ND  Neurological:      Mental Status: patient is alert and oriented times 3.  No resting tremor, normal gait     Cranial Nerves: cranial nerves grossly intact  Muskuloskeletal     Full ROM in extremities     Normal gait muslce spasms noted right trapezius and sternocleidomastoid

## 2021-08-27 ENCOUNTER — PATIENT OUTREACH (OUTPATIENT)
Dept: CASE MANAGEMENT | Age: 66
End: 2021-08-27

## 2021-09-01 ENCOUNTER — TELEPHONE (OUTPATIENT)
Dept: INTERNAL MEDICINE CLINIC | Age: 66
End: 2021-09-01

## 2021-09-10 ENCOUNTER — OFFICE VISIT (OUTPATIENT)
Dept: PULMONOLOGY | Age: 66
End: 2021-09-10
Payer: MEDICARE

## 2021-09-10 ENCOUNTER — TELEPHONE (OUTPATIENT)
Dept: PULMONOLOGY | Age: 66
End: 2021-09-10

## 2021-09-10 VITALS
HEIGHT: 67 IN | TEMPERATURE: 98.5 F | BODY MASS INDEX: 31.99 KG/M2 | WEIGHT: 203.8 LBS | RESPIRATION RATE: 16 BRPM | DIASTOLIC BLOOD PRESSURE: 71 MMHG | HEART RATE: 76 BPM | OXYGEN SATURATION: 98 % | SYSTOLIC BLOOD PRESSURE: 136 MMHG

## 2021-09-10 DIAGNOSIS — R06.09 DYSPNEA ON EXERTION: ICD-10-CM

## 2021-09-10 DIAGNOSIS — R93.89 ABNORMAL CT SCAN, CHEST: Primary | ICD-10-CM

## 2021-09-10 DIAGNOSIS — R06.02 SOB (SHORTNESS OF BREATH): ICD-10-CM

## 2021-09-10 DIAGNOSIS — R91.8 MASS OF RIGHT LUNG: ICD-10-CM

## 2021-09-10 DIAGNOSIS — Z72.0 TOBACCO ABUSE: ICD-10-CM

## 2021-09-10 DIAGNOSIS — R91.1 SOLITARY PULMONARY NODULE: ICD-10-CM

## 2021-09-10 PROCEDURE — 99204 OFFICE O/P NEW MOD 45 MIN: CPT | Performed by: INTERNAL MEDICINE

## 2021-09-10 RX ORDER — DIPHENHYDRAMINE HCL 50 MG
50 CAPSULE ORAL ONCE
Qty: 1 CAPSULE | Refills: 0 | Status: SHIPPED | OUTPATIENT
Start: 2021-09-10 | End: 2021-09-10

## 2021-09-10 RX ORDER — PREDNISONE 50 MG/1
50 TABLET ORAL EVERY 6 HOURS
Qty: 3 TABLET | Refills: 0 | Status: SHIPPED | OUTPATIENT
Start: 2021-09-10 | End: 2021-09-11

## 2021-09-10 NOTE — PROGRESS NOTES
Tramaine Garcia presents today for   Chief Complaint   Patient presents with   [facility] Referral / Consult     referred by Dr. Curtis Downing for right lung lesion    Results     CT chest & CXR 8/11/2021, [place] (+) 8/11/2021       Is someone accompanying this pt? No    Is the patient using any DME equipment during OV? No    -DME Company N/A    Depression Screening:  3 most recent PHQ Screens 9/10/2021   Little interest or pleasure in doing things Not at all   Feeling down, depressed, irritable, or hopeless Not at all   Total Score PHQ 2 0       Learning Assessment:  Learning Assessment 9/10/2021   PRIMARY LEARNER Patient   PRIMARY LANGUAGE ENGLISH   LEARNER PREFERENCE PRIMARY DEMONSTRATION   ANSWERED BY Patient   RELATIONSHIP SELF       Abuse Screening:  Abuse Screening Questionnaire 8/26/2021   Do you ever feel afraid of your partner? N   Are you in a relationship with someone who physically or mentally threatens you? N   Is it safe for you to go home? Y       Fall Risk  Fall Risk Assessment, last 12 mths 9/10/2021   Able to walk? Yes   Fall in past 12 months? 0   Do you feel unsteady? 0   Are you worried about falling 0         Coordination of Care:  1. Have you been to the ER, urgent care clinic since your last visit? Hospitalized since your last visit? Yes; Where: [facility] ED, When: 8/11/2021-COVID-19 virus RNA detecte & lung mass & 8/13/2021-upper back pain on right side & COVID -19    2. Have you seen or consulted any other health care providers outside of the [address] [place] [address] since your last visit? Include any pap smears or colon screening. No    Influenza vaccine received from [employer] (drive-thru at [facility]) on October 2020 per patient. Immunization record updated. Did not receive COVID vaccine d/t having COVID-19 virus in August 2021.

## 2021-09-10 NOTE — PROGRESS NOTES
Order placed for COVID-19 test, per Verbal Order from Dr. Piper Hampton on 9/10/2021. Last office visit: 9/10/2021  Follow up Visit: 9/27/2021 (F/U) & 10/11/2021 (PFT & 6MW)    Provider is aware of last office visit and follow up. No further action requested from provider.

## 2021-09-10 NOTE — PATIENT INSTRUCTIONS
What is the plan?  -Complete lab-work as ordered.  -Complete PET/CT scan as ordered.  -Schedule and complete Pulmonary Function Test after completing repeat COVID-19 test.  -Continue use of Albuterol rescue inhaler as needed for shortness of breath. -Recommend stoping tobacco use     Lung Function Tests: About These Tests  What are these tests? Lung function tests measure how much air your lungs hold and how quickly your lungs can move the air in and out. Spirometry is often the first lung function test that is done. You may also have other tests, such as gas diffusion tests, body plethysmography, inhalation challenge tests, and exercise stress tests. Your doctor will explain which tests you need. These tests check how well your lungs work. They may also be called pulmonary function tests, or PFTs. Why are these tests done? Doctors use lung function tests to find the cause of breathing problems and diagnose lung diseases like asthma or emphysema. You may have lung function tests before you have surgery. Or your doctor may use lung function tests to find out how well treatment for a lung problem is working. How do you prepare for these tests? · Let your doctor know if you take medicines for a lung problem. You may need to stop some of them before the tests. · Do not eat a heavy meal just before this test. A full stomach may keep your lungs from fully expanding. · Don't smoke or do intense exercise for 6 hours before the test.  · For the test, wear loose clothing that doesn't restrict your breathing in any way. · Avoid food or drinks with caffeine. Caffeine can cause your airways to relax and allow more air than usual to pass through. · If you have dentures, wear them during the test. They help you form a tight seal around the mouthpiece of the machine. How are these tests done? What happens during the test depends on the type of test you have. For most tests, you will wear a nose clip.  This is to make sure that no air passes in or out of your nose during the test. You then breathe into a mouthpiece attached to a recording device. · For some tests, you breathe in and out as deeply and as fast as you can. · You may repeat some tests after you inhale a medicine that expands your airways. · You may breathe certain gases, such as 100% oxygen or a mixture of helium and air. · For body plethysmography, you sit inside a small jj with windows. The jj measures pressure changes that occur as you breathe. The therapist may urge you to breathe deeply during some of the tests to get the best results. You may have a blood test to check the oxygen and carbon dioxide levels in your blood. How long do these tests take? The testing may take from 5 to 30 minutes, depending on how many tests you have. What happens after these tests? · You will probably be able to go home right after the tests. · You can go back to your normal activities right away. Follow-up care is a key part of your treatment and safety. Be sure to make and go to all appointments, and call your doctor if you are having problems. It's also a good idea to know your test results and keep a list of the medicines you take. Where can you learn more? Go to http://www.gray.com/  Enter D369 in the search box to learn more about \"Lung Function Tests: About These Tests. \"  Current as of: October 26, 2020               Content Version: 12.8  © 2006-2021 [employer] [facility]. Care instructions adapted under license by [employer] (which disclaims liability or warranty for this information). If you have questions about a medical condition or this instruction, always ask your healthcare professional. [place] [address] any warranty or liability for your use of this information. PET Scan: About This Test  What is it?      A PET scan is a test that uses a special type of camera and a radioactive substance called a \"tracer\" to look at organs in the body. PET stands for positron emission tomography. During the test, the tracer liquid is put into a vein in your arm. It moves through your body and collects in the specific organ or tissue, where it gives off tiny positively charged particles (positrons). The camera records the positrons and turns the recording into pictures on a computer. A computed tomography (CT) scan is often done at the same time as a PET scan. Why is this test done? A PET scan is often used to look for cancer and find heart and brain disorders. How do you prepare for the test?  · Tell your doctor ALL the medicines, vitamins, supplements, and herbal remedies you take. Some may increase the risk of problems during your test. Your doctor will tell you if you should stop taking any of them before the test and how soon to do it. · Don't drink caffeine for 24 hours before a PET scan of your heart. · Don't do any exercise or other strenuous activity for at least 48 hours before this test.  · Don't eat or drink (except water) for at least 6 hours before this test.  · If you are breastfeeding, you may want to pump enough breast milk before the test to get through 1 to 2 days of feeding. The radioactive tracer used in this test can get into your breast milk and is not good for the baby. · Tell your doctor if you get nervous in tight spaces. You may get a medicine to help you relax. If you think you'll get this medicine, be sure you have someone to take you home. How is the test done? · A radioactive tracer will be given in a vein (IV). You may need to wait 30 to 60 minutes for the tracer to move through your body. During this time, you will need to avoid moving and talking. · You will lie on a table that is attached to a PET scanner. · The table will pass slowly through the PET scanner, which is shaped like a doughnut. The scanner picks up signals from the tracer in your body.  It is very important to lie still while each scan is being done. How long does the test take? The test will take 1 to 3 hours. How does having a PET scan feel? You will not feel pain during the test. The table you lie on may be hard and the room may be cool. It may be difficult to lie still during the test.  You may feel a quick sting or pinch when the IV is put in your arm. The tracer is unlikely to cause any side effects. If you don't feel well during or after the test, tell the person who is doing the test.  You may feel nervous inside the PET scanner. What are the risks of a PET scan? Allergic reactions to the tracer are very rare. In rare cases, some soreness or swelling may develop at the IV site where the radioactive tracer was put in. Apply a moist, warm compress to your arm. Anytime you're exposed to radiation, there's a small chance of damage to cells or tissue. That's the case even with the low-level radioactive tracer used for this test. But the chance of damage is very low compared with the benefits of the test.  What happens after the test?  · You will probably be able to go home right away. · You can go back to your usual activities right away. · In rare cases, some soreness or swelling may develop at the IV site where the radioactive tracer was put in. Apply a moist, warm compress to your arm. · The radioactive tracer used in this test can get into your breast milk. Do not breastfeed your baby for 1 or 2 days after this test. During this time, you can give your baby breast milk you stored before the test, or you can give formula. Discard the breast milk you pump in the 1 or 2 days after the test.  · Most of the tracer will leave your body through your urine or stool within a day. So be sure to flush the toilet right after you use it, and wash your hands well with soap and water. The amount of radiation in the tracer is very small.  This means it isn't a risk for people to be around you after the test.  · After the test, drink lots of fluids for the next 24 hours to help flush the tracer out of your body. Follow-up care is a key part of your treatment and safety. Be sure to make and go to all appointments, and call your doctor if you are having problems. It's also a good idea to keep a list of the medicines you take. Ask your doctor when you can expect to have your test results. Where can you learn more? Go to http://www.morelos.com/  Enter Y689 in the search box to learn more about \"PET Scan: About This Test.\"  Current as of: September 23, 2020               Content Version: 12.8  © 2006-2021 [employer]. Care instructions adapted under license by [employer] (which disclaims liability or warranty for this information). If you have questions about a medical condition or this instruction, always ask your healthcare professional. [place] [address] any warranty or liability for your use of this information. Stopping Smoking: Care Instructions  Your Care Instructions     Cigarette smokers crave the nicotine in cigarettes. Giving it up is much harder than simply changing a habit. Your body has to stop craving the nicotine. It is hard to quit, but you can do it. There are many tools that people use to quit smoking. You may find that combining tools works best for you. There are several steps to quitting. First you get ready to quit. Then you get support to help you. After that, you learn new skills and behaviors to become a nonsmoker. For many people, a necessary step is getting and using medicine. Your doctor will help you set up the plan that best meets your needs. You may want to attend a smoking cessation program to help you quit smoking. When you choose a program, look for one that has proven success. Ask your doctor for ideas.  You will greatly increase your chances of success if you take medicine as well as get counseling or join a cessation program.  Some of the changes you feel when you first quit tobacco are uncomfortable. Your body will miss the nicotine at first, and you may feel short-tempered and grumpy. You may have trouble sleeping or concentrating. Medicine can help you deal with these symptoms. You may struggle with changing your smoking habits and rituals. The last step is the tricky one: Be prepared for the smoking urge to continue for a time. This is a lot to deal with, but keep at it. You will feel better. Follow-up care is a key part of your treatment and safety. Be sure to make and go to all appointments, and call your doctor if you are having problems. It's also a good idea to know your test results and keep a list of the medicines you take. How can you care for yourself at home? · Ask your family, friends, and coworkers for support. You have a better chance of quitting if you have help and support. · Join a support group, such as Nicotine Anonymous, for people who are trying to quit smoking. · Consider signing up for a smoking cessation program, such as the American Lung Association's Freedom from Smoking program.  · Get text messaging support. Go to the website at www.smokefree. gov to sign up for the [facility] program.  · Set a quit date. Pick your date carefully so that it is not right in the middle of a big deadline or stressful time. Once you quit, do not even take a puff. Get rid of all ashtrays and lighters after your last cigarette. Clean your house and your clothes so that they do not smell of smoke. · Learn how to be a nonsmoker. Think about ways you can avoid those things that make you reach for a cigarette. ? Avoid situations that put you at greatest risk for smoking. For some people, it is hard to have a drink with friends without smoking. For others, they might skip a coffee break with coworkers who smoke. ? Change your daily routine.  Take a different route to work or eat a meal in a different place.  · Cut down on stress. Calm yourself or release tension by doing an activity you enjoy, such as reading a book, taking a hot bath, or gardening. · Talk to your doctor or pharmacist about nicotine replacement therapy, which replaces the nicotine in your body. You still get nicotine but you do not use tobacco. Nicotine replacement products help you slowly reduce the amount of nicotine you need. These products come in several forms, many of them available over-the-counter:  ? Nicotine patches  ? Nicotine gum and lozenges  ? Nicotine inhaler  · Ask your doctor about bupropion (Wellbutrin) or varenicline (Chantix), which are prescription medicines. They do not contain nicotine. They help you by reducing withdrawal symptoms, such as stress and anxiety. · Some people find hypnosis, acupuncture, and massage helpful for ending the smoking habit. · Eat a healthy diet and get regular exercise. Having healthy habits will help your body move past its craving for nicotine. · Be prepared to keep trying. Most people are not successful the first few times they try to quit. Do not get mad at yourself if you smoke again. Make a list of things you learned and think about when you want to try again, such as next week, next month, or next year. Where can you learn more? Go to http://www.gray.com/  Enter [id number] in the search box to learn more about \"Stopping Smoking: Care Instructions. \"  Current as of: March 12, 2020               Content Version: 12.8  © 5936-8936 [employer]. Care instructions adapted under license by [email or web address] (which disclaims liability or warranty for this information). If you have questions about a medical condition or this instruction, always ask your healthcare professional. [address] any warranty or liability for your use of this information.

## 2021-09-10 NOTE — TELEPHONE ENCOUNTER
Called patient to inform her that PET/CT has been schedule at [facility] On 9/17/2021 at 12:30pm. Pre-procedure instructions given. Also, reminded her to get labs done on the same day. Patient verbalized understanding.

## 2021-09-10 NOTE — PROGRESS NOTES
[facility]                                                       Pulmonary, Critical Care, and Sleep Medicine      Pulmonary Office Initial Consultation    Name: Jami Shi     : 1955     Date: 9/10/2021        Subjective:   Patient has been referred for evaluation of: abnormal CT chest; lung mass. Patient is a 73 y.o. female    Patient is a 67y/o Female with a no significant past medical history who presents to the clinic today for evaluation of new Rt lung mass. Recent (+)COVID in 2021. Today in clinic, she states that she has some shortness of breath which has been present since COVID diagnosis. Prior to this, some intermittent SOB, along with shoulder and neck pain. Reports worsening of the Rt shoulder pain recently. She admits to a cough intermittently productive of clear phlegm which has been present for the past two years. She is able to walk 50-75 feet before getting short of breath currently. Admits to wheezing for a couple of years. She also admits to a weight loss of 20 Ibs in the past month. Attributes this to not being able to eat. She denies fever, chills, night sweats. No hemoptysis. Denies history of breathing issues as child and denies Asthma history. Family history of brain cancer in uncle; dad with merkel cell - now . Colon Ca & and cervical cancer also present in the family. Currently on Albuterol inhaler; using q4-6 hours which provides some relief. COVID: (+)2021; not yet vaccinated. Plans to complete vaccination soon. Pets: Dog. Report some sinus congestion/sneezing. Tobacco: smoking <1 ppd; previously smoked 2ppd x 40 years. Has tried to quit smoking on her own without success.   : No  PE/DVT: None  Occupation: Housewife; previously worked in a chicken house for ~ 20 years  Exposures: some occupational exposures  Marital status: reports losing her  in May, 2021 due to MI. Past Medical History:   Diagnosis Date    Abnormal CXR     Arthritis     COVID-19        Past Surgical History:   Procedure Laterality Date    HX HYSTERECTOMY         Social History     Socioeconomic History    Marital status:      Spouse name: Not on file    Number of children: Not on file    Years of education: Not on file    Highest education level: Not on file   Tobacco Use    Smoking status: Current Every Day Smoker     Packs/day: 1.50     Years: 40.00     Pack years: 60.00    Smokeless tobacco: Never Used   Vaping Use    Vaping Use: Never used   Substance and Sexual Activity    Alcohol use: Not Currently    Drug use: Never     Social Determinants of Health     Financial Resource Strain:     Difficulty of Paying Living Expenses:    Food Insecurity:     Worried About Running Out of Food in the Last Year:     Ran Out of Food in the Last Year:    Transportation Needs:     Lack of Transportation (Medical):  Lack of Transportation (Non-Medical):    Physical Activity:     Days of Exercise per Week:     Minutes of Exercise per Session:    Stress:     Feeling of Stress :    Social Connections:     Frequency of Communication with Friends and Family:     Frequency of Social Gatherings with Friends and Family:     Attends Gnosticist Services:     Active Member of Clubs or Organizations:     Attends Club or Organization Meetings:     Marital Status:        Family History   Problem Relation Age of Onset    Heart Disease Mother     Cancer Mother     Diabetes Father     COPD Brother     Emphysema Brother     Cancer Maternal Aunt     Cancer Maternal Uncle     Cancer Paternal Aunt        Allergies   Allergen Reactions    Iodine And [address]       .   Current Outpatient Medications   Medication Sig Dispense Refill    albuterol (PROVENTIL HFA, VENTOLIN HFA, PROAIR HFA) 90 mcg/actuation inhaler Take 1 Puff by inhalation every six (6) hours as needed for Wheezing. (Patient taking differently: Take 2 Puffs by inhalation every six (6) hours as needed for Wheezing.) 1 Inhaler 0    methocarbamoL (ROBAXIN) 500 mg tablet Take 1 Tablet by mouth four (4) times daily. 30 Tablet 2    ketorolac (TORADOL) 10 mg tablet Take 1 Tablet by mouth every six (6) hours as needed for Pain. (Patient not taking: Reported on 9/10/2021) 20 Tablet 0    cyclobenzaprine (FLEXERIL) 10 mg tablet Take 1 Tablet by mouth three (3) times daily (with meals). (Patient not taking: Reported on 9/10/2021) 20 Tablet 0       Review of Systems:  HEENT: No epistaxis, no nasal drainage, no difficulty in swallowing, no redness in eyes  Respiratory: as above  Cardiovascular: no chest pain, no palpitations, no chronic leg edema, no syncope  Gastrointestinal: no abd pain, no vomiting, no diarrhea, no bleeding symptoms  Integument/breast: No ulcers or rashes  Musculoskeletal: Neg  Neurological: No focal weakness, no seizures, no headaches  Behvioral/Psych: No anxiety, no depression   Constitutional: No fever, no chills, no night sweats. (+)Unintentinal weight loss. Objective:     Visit Vitals  BP (!) 140/81 (BP 1 Location: Left upper arm, BP Patient Position: Sitting, BP Cuff Size: Adult)   Pulse 75   Temp 98.5 °F (36.9 °C) (Temporal)   Resp 16   Ht 5' 7\" (1.702 m)   Wt 92.4 kg (203 lb 12.8 oz)   SpO2 98%   BMI 31.92 kg/m²        Physical Exam:   General: comfortable, no acute distress  HEENT: pupils reactive, sclera anicteric, EOM intact. Mallampati class III. Neck: No adenopathy or thyroid swelling, no lymphadenopathy or JVD, supple  CVS: S1S2 no murmurs  RS: Lungs CTA. No wheezes or rhonchi. No tachypnea or accessory muscle use.  Increased AP diameter  Abd: soft, non tender, BS normal  Neuro: non focal, awake, alert  Extrm: no leg edema, clubbing or cyanosis  Skin: no rash    Data review:   Pertinent labs: CBC, BMP, LFT's  PFT: none available    6 min walk test: None available for review    Echo: Not available for review. Imaging:  I have personally reviewed the patients radiographs and have reviewed the reports:  XR Results (most recent):  Results from Hospital Encounter encounter on 08/11/21    XR CHEST PORT    Narrative  EXAM: XR CHEST PORT    CLINICAL INDICATION/HISTORY: covid exposure  -Additional: None    COMPARISON: 12/14/2015    TECHNIQUE: Portable frontal view of the chest    _______________    FINDINGS:    SUPPORT DEVICES: None. HEART AND MEDIASTINUM: Cardiomediastinal silhouette within normal limits. LUNGS AND PLEURAL SPACES: Right suprahilar paramediastinal masslike  consolidation. No large effusion or pneumothorax.    _______________    Impression  Right suprahilar paramediastinal masslike consolidation, concerning for possible  underlying mass. Recommend further evaluation with dedicated CT chest.      CT Results (most recent):  Results from Hospital Encounter encounter on 08/11/21    CT CHEST WO CONT    Narrative  EXAM: CT Chest    INDICATION: Shortness of breath, abnormal chest radiograph. COMPARISON: Chest radiographs 8/11/2011. TECHNIQUE: Axial CT imaging from the thoracic inlet through the diaphragm  Without intravenous contrast. Multiplanar reformations were generated. One or more dose reduction techniques were used on this CT: automated exposure  control, adjustment of the mAs and/or kVp according to patient size, and  iterative reconstruction techniques. The specific techniques used on this CT  exam have been documented in the patient's electronic medical record. Digital  Imaging and Communications in Medicine (DICOM) format image data are available  to nonaffiliated external healthcare facilities or entities on a secure, media  free, reciprocally searchable basis with patient authorization for at least a  12-month period after this study.     _______________    FINDINGS:    LUNGS:  > Within the paramedian right upper lobe, there is a tissue mass which is  estimated to measure approximately 4.1 x 4.4 by 5.5 cm in size (greatest  diameter craniocaudal). This mass is noted to invade the adjacent medial  mediastinal pleura. Localized spiculations noted across the anterior and  superior pleural surfaces with faint surrounding groundglass opacity. > There is mild and diffuse mosaic attenuation of the pulmonary parenchyma  noted.  > Scattered areas of subsegmental atelectasis noted at the lung bases. > 3 mm subpleural right lower lobe pulmonary nodule (image 218)    PLEURA: No pneumothorax or pleural effusion. AIRWAY: Diffuse bronchial wall thickening. MEDIASTINUM: Included thyroid gland is unremarkable. Thoracic aorta normal in  course/caliber. Normal cardiac size. No pericardial effusion. LYMPH NODES: No supraclavicular or axillary lymph node enlargement. Enlarged  precarinal lymph node (image 40) measuring approximately 1.4 cm in short axis  dimension. No discretely enlarged hilar lymph nodes. Several calcified left  hilar lymph nodes in keeping with healed granulomatous disease. UPPER ABDOMEN: Scattered splenic granulomata, otherwise unremarkable. OSSEOUS STRUCTURES: No acute or aggressive appearing osseous abnormality. OTHER:    _______________    Impression  1. Right superhilar mass measuring 4.1 x 4.4 x 5.5 cm in size highly concerning  for potential bronchogenic neoplasm.  > Invasion of the adjacent medial mediastinal pleura is noted with precarinal  adenopathy.  > No pleural effusion. 2. Diffuse mosaic attenuation of the pulmonary parenchyma and mild bronchial  wall thickening; findings which can be seen in the context of small airways  disease. 3. Small (3 mm) subpleural right lower lobe pulmonary nodule, attention on  follow-up imaging recommended.    ========    Fleischner Society Pulmonary Nodule Guidelines (revised 2017): Solid nodule >8 mm: Consider CT in 3 months, PET-CT, or tissue sampling.   Pulmonary or Thoracic surgery consultation is also recommended for management. There is no problem list on file for this patient. IMPRESSION:   · Right lung mass - CT w/ 5.5 cm Rt lung mass highly suspicious for malignancy. Also noted is possible invasion of mediastinal pleura and pre-carinal adenopathy. Given >80 pack year smoking history and clinical presentation, findings highly concerning for malignancy. · Dyspnea on exertion - suspect underlying obstructive pulmonary impairment secondary to chronic tobacco use vs chronic environmental exposures. CT chest without significant emphysematous changes. Symptoms improved with DERECK use. No prior TTE for evaluation. · Post viral syndrome (COVID-19) - suspect persistent symptoms of dyspnea post COVID-19 infection. Currently on DERECK therapy. May benefit from ICS therapy; will discuss at next visit. · Unintentional weight loss - likely secondary to above. · Elevated BP - recommend follow-up with PCP for further management      RECOMMENDATIONS:   · Obtain PET-CT scan for further evaluation of right lung mass. · Patient will likely require bronchoscopy/EBUS with lymph node sampling for further work-up of CT chest findings. · Obtain pulmonary function testing along with 6-minute walk. Will require Covid testing prior to PFT. · Pharmacotherapy- continue DERECK use q4-6hrs as needed for shortness of breath. Will likely require triple therapy for alleviation of symptoms. · Check Quantiferon gold  · Recommend obtaining COVID-19 vaccination. · Pulmonary rehab - will discuss pending work-up for Rt lung mass. · Follow up - in 2 weeks     Smoking cessation  The patient was counseled on the dangers of tobacco use, and was advised to quit and referred to a tobacco cessation program.  Reviewed strategies to maximize success, including removing cigarettes and smoking materials from environment, stress management and written materials.       Health maintenance screens deferred to Primary care provider.      Hoang Devries, DO

## 2021-09-13 ENCOUNTER — TELEPHONE (OUTPATIENT)
Dept: PULMONOLOGY | Age: 66
End: 2021-09-13

## 2021-09-13 NOTE — TELEPHONE ENCOUNTER
Called patient back re: questions about medications. Was told that she received meds from her pharmacy to take before procedure with contrast. Informed her to take those meds prior to her PET/CT procedure. Also, had a question about COVID testing prior to her PFT. Reminded her to get COVID testing done at least 3 days prior to her breathing test and to make sure she gets a hard copy of result to bring with her to office on the day of her test since she is going to get tested at a pharmacy closer to her. Patient verbalized understanding.

## 2021-09-13 NOTE — TELEPHONE ENCOUNTER
Pt called([phone]). Pt needs to talk to  Marie wanting to know what test is she supposed to take for what medication. She was given two medications. Please check and call her back.

## 2021-09-17 ENCOUNTER — HOSPITAL ENCOUNTER (OUTPATIENT)
Dept: PET IMAGING | Age: 66
Discharge: HOME | End: 2021-09-17
Attending: INTERNAL MEDICINE
Payer: MEDICARE

## 2021-09-17 DIAGNOSIS — R91.8 MASS OF RIGHT LUNG: ICD-10-CM

## 2021-09-17 DIAGNOSIS — R93.89 ABNORMAL CT SCAN, CHEST: ICD-10-CM

## 2021-09-17 PROCEDURE — A9552 F18 FDG: HCPCS

## 2021-09-17 PROCEDURE — 74011000250 HC RX REV CODE- 250: Performed by: INTERNAL MEDICINE

## 2021-09-17 RX ORDER — FLUDEOXYGLUCOSE F-18 200 MCI/ML
9.94 INJECTION INTRAVENOUS ONCE
Status: COMPLETED | OUTPATIENT
Start: 2021-09-17 | End: 2021-09-17

## 2021-09-17 RX ORDER — BARIUM SULFATE 20 MG/ML
900 SUSPENSION ORAL
Status: COMPLETED | OUTPATIENT
Start: 2021-09-17 | End: 2021-09-17

## 2021-09-17 RX ADMIN — BARIUM SULFATE 600 ML: 21 SUSPENSION ORAL at 13:00

## 2021-09-17 RX ADMIN — FLUDEOXYGLUCOSE F-18 9.94 MILLICURIE: 200 INJECTION INTRAVENOUS at 13:00

## 2021-09-20 ENCOUNTER — PATIENT OUTREACH (OUTPATIENT)
Dept: CASE MANAGEMENT | Age: 66
End: 2021-09-20

## 2021-09-20 ENCOUNTER — OFFICE VISIT (OUTPATIENT)
Dept: INTERNAL MEDICINE CLINIC | Age: 66
End: 2021-09-20
Payer: MEDICARE

## 2021-09-20 VITALS
BODY MASS INDEX: 32.21 KG/M2 | HEIGHT: 67 IN | RESPIRATION RATE: 20 BRPM | WEIGHT: 205.2 LBS | SYSTOLIC BLOOD PRESSURE: 135 MMHG | HEART RATE: 87 BPM | OXYGEN SATURATION: 95 % | DIASTOLIC BLOOD PRESSURE: 76 MMHG | TEMPERATURE: 96.5 F

## 2021-09-20 DIAGNOSIS — R91.8 MASS OF RIGHT LUNG: ICD-10-CM

## 2021-09-20 DIAGNOSIS — Z72.0 TOBACCO ABUSE: ICD-10-CM

## 2021-09-20 DIAGNOSIS — Z13.31 SCREENING FOR DEPRESSION: ICD-10-CM

## 2021-09-20 DIAGNOSIS — R06.09 DYSPNEA ON EXERTION: Primary | ICD-10-CM

## 2021-09-20 DIAGNOSIS — Z78.0 POST-MENOPAUSAL: Primary | ICD-10-CM

## 2021-09-20 DIAGNOSIS — Z00.00 WELCOME TO MEDICARE PREVENTIVE VISIT: ICD-10-CM

## 2021-09-20 DIAGNOSIS — Z13.6 SCREENING FOR ISCHEMIC HEART DISEASE: ICD-10-CM

## 2021-09-20 DIAGNOSIS — R06.02 SOB (SHORTNESS OF BREATH): ICD-10-CM

## 2021-09-20 DIAGNOSIS — Z12.11 SCREEN FOR COLON CANCER: ICD-10-CM

## 2021-09-20 DIAGNOSIS — Z13.39 SCREENING FOR ALCOHOLISM: ICD-10-CM

## 2021-09-20 DIAGNOSIS — S46.811D STRAIN OF RIGHT TRAPEZIUS MUSCLE, SUBSEQUENT ENCOUNTER: ICD-10-CM

## 2021-09-20 DIAGNOSIS — J44.9 CHRONIC OBSTRUCTIVE PULMONARY DISEASE, UNSPECIFIED COPD TYPE (HCC): ICD-10-CM

## 2021-09-20 DIAGNOSIS — Z11.59 NEED FOR HEPATITIS C SCREENING TEST: ICD-10-CM

## 2021-09-20 DIAGNOSIS — Z12.31 ENCOUNTER FOR SCREENING MAMMOGRAM FOR MALIGNANT NEOPLASM OF BREAST: ICD-10-CM

## 2021-09-20 PROCEDURE — G8536 NO DOC ELDER MAL SCRN: HCPCS | Performed by: INTERNAL MEDICINE

## 2021-09-20 PROCEDURE — 99214 OFFICE O/P EST MOD 30 MIN: CPT | Performed by: INTERNAL MEDICINE

## 2021-09-20 PROCEDURE — G9899 SCRN MAM PERF RSLTS DOC: HCPCS | Performed by: INTERNAL MEDICINE

## 2021-09-20 PROCEDURE — G8427 DOCREV CUR MEDS BY ELIG CLIN: HCPCS | Performed by: INTERNAL MEDICINE

## 2021-09-20 PROCEDURE — 1090F PRES/ABSN URINE INCON ASSESS: CPT | Performed by: INTERNAL MEDICINE

## 2021-09-20 PROCEDURE — G8417 CALC BMI ABV UP PARAM F/U: HCPCS | Performed by: INTERNAL MEDICINE

## 2021-09-20 PROCEDURE — G8510 SCR DEP NEG, NO PLAN REQD: HCPCS | Performed by: INTERNAL MEDICINE

## 2021-09-20 PROCEDURE — 1101F PT FALLS ASSESS-DOCD LE1/YR: CPT | Performed by: INTERNAL MEDICINE

## 2021-09-20 PROCEDURE — 3017F COLORECTAL CA SCREEN DOC REV: CPT | Performed by: INTERNAL MEDICINE

## 2021-09-20 PROCEDURE — G0402 INITIAL PREVENTIVE EXAM: HCPCS | Performed by: INTERNAL MEDICINE

## 2021-09-20 PROCEDURE — G8400 PT W/DXA NO RESULTS DOC: HCPCS | Performed by: INTERNAL MEDICINE

## 2021-09-20 RX ORDER — DICLOFENAC SODIUM 25 MG/1
TABLET, DELAYED RELEASE ORAL
COMMUNITY
Start: 2021-09-15 | End: 2021-09-20

## 2021-09-20 RX ORDER — TRIAMCINOLONE ACETONIDE 1 MG/G
CREAM TOPICAL
COMMUNITY
Start: 2021-09-15 | End: 2022-10-21

## 2021-09-20 RX ORDER — ALBUTEROL SULFATE 90 UG/1
2 AEROSOL, METERED RESPIRATORY (INHALATION)
Qty: 1 EACH | Refills: 5 | Status: SHIPPED | OUTPATIENT
Start: 2021-09-20

## 2021-09-20 RX ORDER — DIPHENHYDRAMINE HCL 50 MG/1
CAPSULE ORAL
COMMUNITY
Start: 2021-09-10 | End: 2021-09-20 | Stop reason: ALTCHOICE

## 2021-09-20 RX ORDER — CYCLOBENZAPRINE HCL 10 MG
10 TABLET ORAL
Qty: 60 TABLET | Refills: 1 | Status: SHIPPED | OUTPATIENT
Start: 2021-09-20 | End: 2022-10-21

## 2021-09-20 NOTE — PROGRESS NOTES
Patient attended appointment with PCP today. Patient resolved from [address] Transitions episode on 9/20/21. Discussed COVID-19 related testing which was available at this time. Test results were positive. Patient informed of results, if available? yes     Patient/family has been provided the following resources and education related to COVID-19:                         Signs, symptoms and red flags related to COVID-19            CDC exposure and quarantine guidelines            [employer] exposure contact - [phone]            Contact for their local Department of Health                 No further outreach scheduled with this [facility] Episode of Care resolved. Patient has this [facility] contact information if future needs arise.

## 2021-09-20 NOTE — PROGRESS NOTES
Patient continues to have right shoulder pain. Delwayne Wilkinson presents today for   Chief Complaint   Patient presents with    Follow-up       Is someone accompanying this pt? no  Is the patient using any DME equipment during OV? no    Depression Screening:  3 most recent PHQ Screens 9/20/2021   Little interest or pleasure in doing things Not at all   Feeling down, depressed, irritable, or hopeless Not at all   Total Score PHQ 2 0       Learning Assessment:  Learning Assessment 9/10/2021   PRIMARY LEARNER Patient   PRIMARY LANGUAGE ENGLISH   LEARNER PREFERENCE PRIMARY DEMONSTRATION   ANSWERED BY Patient   RELATIONSHIP SELF       Fall Risk  Fall Risk Assessment, last 12 mths 9/20/2021   Able to walk? Yes   Fall in past 12 months? 0   Do you feel unsteady? 0   Are you worried about falling 0       ADL  ADL Assessment 9/20/2021   Feeding yourself No Help Needed   Getting from bed to chair No Help Needed   Getting dressed No Help Needed   Bathing or showering No Help Needed   Walk across the room (includes cane/walker) No Help Needed   Using the telphone No Help Needed   Taking your medications No Help Needed   Preparing meals No Help Needed   Managing money (expenses/bills) No Help Needed   Moderately strenuous housework (laundry) No Help Needed   Shopping for personal items (toiletries/medicines) No Help Needed   Shopping for groceries No Help Needed   Driving No Help Needed   Climbing a flight of stairs No Help Needed   Getting to places beyond walking distances No Help Needed       Health Maintenance reviewed and discussed and ordered per Provider.     Health Maintenance Due   Topic Date Due    Hepatitis C Screening  Never done    COVID-19 Vaccine (1) Never done    DTaP/Tdap/Td series (1 - Tdap) Never done    Cervical cancer screen  Never done    Lipid Screen  Never done    Colorectal Cancer Screening Combo  Never done    Shingrix Vaccine Age 50> (1 of 2) Never done    Low dose CT lung screening Never done    Breast Cancer Screen Mammogram  Never done    Flu Vaccine (1) 09/01/2021    Bone Densitometry (Dexa) Screening  Never done    Pneumococcal 65+ years (1 of 1 - PPSV23) Never done    Medicare Yearly Exam  09/01/2021   . Coordination of Care:  1. Have you been to the ER, urgent care clinic since your last visit? Hospitalized since your last visit? no    2. Have you seen or consulted any other health care providers outside of the [address] [place] [address] since your last visit? Include any pap smears or colon screening. no    This is a \"Welcome to [employer]"  Initial Preventive Physical Examination (IPPE) providing Personalized Prevention Plan Services (Performed in the first 12 months of enrollment)    I have reviewed the patient's medical history in detail and updated the computerized patient record. Assessment/Plan   Education and counseling provided:  Are appropriate based on today's review and evaluation    1. Welcome to Medicare preventive visit  2. Screening for alcoholism  -     PA ANNUAL ALCOHOL SCREEN 15 MIN  3. Screening for depression  -     DEPRESSION SCREEN ANNUAL     The patient is behind on all health maintenance. We will order a colonoscopy mammogram and a bone density test.  She is going to get her Covid vaccine shortly. She is going to get her pneumonia shot at her next visit  Depression Risk Screen     3 most recent PHQ Screens 9/20/2021   Little interest or pleasure in doing things Not at all   Feeling down, depressed, irritable, or hopeless Not at all   Total Score PHQ 2 0       Alcohol Risk Screen    Do you average more than 1 drink per night or more than 7 drinks a week:  No    On any one occasion in the past three months have you have had more than 3 drinks containing alcohol:  No          Functional Ability and Level of Safety    Diet: No special diet      Hearing: Hearing is good. Vision Screening:  Vision is good.   No exam data present      Activities of Daily Living: The home contains: no safety equipment. Patient does total self care      Ambulation: with no difficulty      Exercise level: sedentary     Fall Risk Screen:  Fall Risk Assessment, last 12 mths 9/20/2021   Able to walk? Yes   Fall in past 12 months? 0   Do you feel unsteady? 0   Are you worried about falling 0      Abuse Screen:  Patient is not abused       Screening EKG   EKG order placed: Yes    End of Life Planning   Advanced care planning directives were discussed with the patient and /or family/caregiver. Health Maintenance Due     Health Maintenance Due   Topic Date Due    Hepatitis C Screening  Never done    COVID-19 Vaccine (1) Never done    DTaP/Tdap/Td series (1 - Tdap) Never done    Cervical cancer screen  Never done    Lipid Screen  Never done    Colorectal Cancer Screening Combo  Never done    Shingrix Vaccine Age 50> (1 of 2) Never done    Low dose CT lung screening  Never done    Breast Cancer Screen Mammogram  Never done    Flu Vaccine (1) 09/01/2021    Bone Densitometry (Dexa) Screening  Never done    Pneumococcal 65+ years (1 of 1 - PPSV23) Never done       Patient Care Team   Patient Care Team:  Lyle Croft MD as PCP - General (Internal Medicine)  Lyle Croft MD as PCP - [facility] Provider  Noa Ramírez as Ambulatory Care Manager  [address],  (Pulmonary Disease)    History     Past Medical History:   Diagnosis Date    Abnormal CXR     Arthritis     COVID-19       Past Surgical History:   Procedure Laterality Date    HX HYSTERECTOMY       Current Outpatient Medications   Medication Sig Dispense Refill    diclofenac EC (VOLTAREN) 25 mg EC tablet       triamcinolone acetonide (KENALOG) 0.1 % topical cream       albuterol (PROVENTIL HFA, VENTOLIN HFA, PROAIR HFA) 90 mcg/actuation inhaler Take 1 Puff by inhalation every six (6) hours as needed for Wheezing.  (Patient taking differently: Take 2 Puffs by inhalation every six (6) hours as needed for Wheezing.) 1 Inhaler 0    methocarbamoL (ROBAXIN) 500 mg tablet Take 1 Tablet by mouth four (4) times daily.  30 Tablet 2     Allergies   Allergen Reactions    Iodine And Iodide Containing Products Hives       Family History   Problem Relation Age of Onset    Heart Disease Mother     Cancer Mother     Diabetes Father     COPD Brother     Emphysema Brother     Cancer Maternal Aunt     Cancer Maternal Uncle     Cancer Paternal Aunt      Social History     Tobacco Use    Smoking status: Current Every Day Smoker     Packs/day: 1.50     Years: 40.00     Pack years: 60.00    Smokeless tobacco: Never Used   Substance Use Topics    Alcohol use: Not Currently       Aydee Choe LPN

## 2021-09-20 NOTE — PROGRESS NOTES
1. Welcome to Medicare preventive visit  Refer to Medicare wellness    2. Screening for alcoholism  Refer to Medicare wellness  - [address] 15 MIN    3. Screening for depression  Refer to Medicare wellness  [facility]    4. Post-menopausal  Refer to Medicare wellness  - [address]; Future    5. Need for hepatitis C screening test  Refer to Medicare wellness  - HEPATITIS C AB    6. Screen for colon cancer  Refer to Medicare wellness  - REFERRAL FOR COLONOSCOPY    7. Encounter for screening mammogram for malignant neoplasm of breast  Refer to Medicare wellness  - [facility] BI SCREENING INCL CAD; Future    8. Screening for ischemic heart disease  Refer to Medicare wellness  - LIPID PANEL    9. Tobacco abuse  We discussed quitting several times. She is going to start with nicotine patches    10. Strain of right trapezius muscle, subsequent encounter  The Robaxin has been helping a little bit. I am hoping this pain is not from her cancer. We will start Flexeril 10 mg 3 times daily. I have asked her to start ibuprofen 600 mg 4 times daily to always be taken with food  - cyclobenzaprine (FLEXERIL) 10 mg tablet; Take 1 Tablet by mouth three (3) times daily as needed for Muscle Spasm(s). Dispense: 60 Tablet; Refill: 1    11. Mass of right lung  Do not see a CBC I am going to check this to see if her cell lines are normal  - CBC WITH AUTOMATED DIFF    12. Chronic obstructive pulmonary disease, unspecified COPD type ([facility] [address]  This is a relatively new diagnosis. I have instructed her to take 2 puffs every 4-6 hours as needed. A controller medication may be indicated soon  - albuterol (PROVENTIL HFA, VENTOLIN HFA, PROAIR HFA) 90 mcg/actuation inhaler; Take 2 Puffs by inhalation every six (6) hours as needed for Wheezing.   Dispense: 1 Each; Refill: 5    9/10/21 Dr. Juani Montes pulmonary note reviewed  9/17/21 pet scan reviewed by nory    Chief Complaint   Patient presents with   [employer] Follow-up        HPI   This is a very pleasant 72-year-old female who presents for follow-up on her lung mass and tobacco use as well as for her welcome to Medicare. She reports she has been doing okay and had an appointment with pulmonary earlier in September. She reports she continues to have right-sided shoulder pain over her right trapezius muscle. She also reports some mild pain in her back. She does have wheezing from time to time and has been using albuterol inhaler which is helped a little bit. She denies any chest pain or difficulty breathing currently. She denies any nausea or vomiting. She denies any fevers or chills. She does report a chronic cough but that is nothing new for her. She does continue to smoke. There is no problem list on file for this patient. Current Outpatient Medications on File Prior to Visit   Medication Sig Dispense Refill    methocarbamoL (ROBAXIN) 500 mg tablet Take 1 Tablet by mouth four (4) times daily. 30 Tablet 2     No current facility-administered medications on file prior to visit. ROS  - GEN: no weight gain/loss, no fevers or chills  - HEENT: no vision changes, no tinnitus, no sore throat  - CV: no cp, palpitations or edema  - RESP: no sob, +cough  - ABD: no n/v/d, no blood in stool  - : no dysuria or changes in freq. - SKIN: no rashes, ulcers  - Neuro: no resting tremors, parasthesia in extremities, no headaches  - MS: No weakness in extremities, no gait abnormalities  - Psych: negative for depression or anxiety      Visit Vitals  /76   Pulse 87   Temp (!) 96.5 °F (35.8 °C)   Resp 20   Ht 5' 7\" (1.702 m)   Wt 205 lb 3.2 oz (93.1 kg)   SpO2 95%   BMI 32.14 kg/m²           Physical Exam  Constitutional:       Appearance: Normal appearance. overweight. NAD and pleasant  HENT:      Head: Normocephalic.       Nose: Nose normal.      Mouth/Throat:      Mouth: Mucous membranes are moist. Throat not inflammed  Eyes:      Extraocular Movements: Extraocular movements intact. Conjunctiva/sclera: Conjunctivae normal. Sclera anicteric     Pupils: Pupils are equal, round, and reactive to light. Cardiovascular:      Rate and Rhythm: Normal rate and regular rhythm. Pulses: Normal pulses. Pulmonary:      Effort: No respiratory distress. Breath sounds: diminished no wheezing. Abdominal:      General: There is no distension. NT, ND  Neurological:      Mental Status: patient is alert and oriented times 3.  No resting tremor, normal gait

## 2021-09-20 NOTE — PATIENT INSTRUCTIONS
Medicare Wellness Visit, Female     The best way to live healthy is to have a lifestyle where you eat a well-balanced diet, exercise regularly, limit alcohol use, and quit all forms of tobacco/nicotine, if applicable. Regular preventive services are another way to keep healthy. Preventive services (vaccines, screening tests, monitoring & exams) can help personalize your care plan, which helps you manage your own care. Screening tests can find health problems at the earliest stages, when they are easiest to treat. Luca follows the current, evidence-based guidelines published by the [facility] Mahesh Izaguirre ([facility]) when recommending preventive services for our patients. Because we follow these guidelines, sometimes recommendations change over time as research supports it. (For example, mammograms used to be recommended annually. Even though Medicare will still pay for an annual mammogram, the newer guidelines recommend a mammogram every two years for women of average risk). Of course, you and your doctor may decide to screen more often for some diseases, based on your risk and your co-morbidities (chronic disease you are already diagnosed with). Preventive services for you include:  - Medicare offers their members a free annual wellness visit, which is time for you and your primary care provider to discuss and plan for your preventive service needs. Take advantage of this benefit every year!  -All adults over the age of 72 should receive the recommended pneumonia vaccines. Current USPSTF guidelines recommend a series of two vaccines for the best pneumonia protection.   -All adults should have a flu vaccine yearly and a tetanus vaccine every 10 years.   -All adults age 48 and older should receive the shingles vaccines (series of two vaccines).       -All adults age 38-68 who are overweight should have a diabetes screening test once every three years.   -All adults born between 80 and 1965 should be screened once for Hepatitis C.  -Other screening tests and preventive services for persons with diabetes include: an eye exam to screen for diabetic retinopathy, a kidney function test, a foot exam, and stricter control over your cholesterol.   -Cardiovascular screening for adults with routine risk involves an electrocardiogram (ECG) at intervals determined by your doctor.   -Colorectal cancer screenings should be done for adults age 54-65 with no increased risk factors for colorectal cancer. There are a number of acceptable methods of screening for this type of cancer. Each test has its own benefits and drawbacks. Discuss with your doctor what is most appropriate for you during your annual wellness visit. The different tests include: colonoscopy (considered the best screening method), a fecal occult blood test, a fecal DNA test, and sigmoidoscopy.    -A bone mass density test is recommended when a woman turns 65 to screen for osteoporosis. This test is only recommended one time, as a screening. Some providers will use this same test as a disease monitoring tool if you already have osteoporosis. -Breast cancer screenings are recommended every other year for women of normal risk, age 54-69.  -Cervical cancer screenings for women over age 72 are only recommended with certain risk factors.      Here is a list of your current Health Maintenance items (your personalized list of preventive services) with a due date:  Health Maintenance Due   Topic Date Due    Hepatitis C Test  Never done    COVID-19 Vaccine (1) Never done    DTaP/Tdap/Td  (1 - Tdap) Never done    Cervical cancer screen  Never done    Cholesterol Test   Never done    Colorectal Screening  Never done    Shingles Vaccine (1 of 2) Never done    Smoker or Former Smoker - [address]  Never done    Mammogram  Never done    Yearly Flu Vaccine (1) 09/01/2021    Bone Mineral Density   Never done  Pneumococcal Vaccine (1 of 1 - PPSV23) Never done         Medicare Wellness Visit, Female     The best way to live healthy is to have a lifestyle where you eat a well-balanced diet, exercise regularly, limit alcohol use, and quit all forms of tobacco/nicotine, if applicable. Regular preventive services are another way to keep healthy. Preventive services (vaccines, screening tests, monitoring & exams) can help personalize your care plan, which helps you manage your own care. Screening tests can find health problems at the earliest stages, when they are easiest to treat. Kimjanice follows the current, evidence-based guidelines published by the [facility] Mahesh Zina ([facility]) when recommending preventive services for our patients. Because we follow these guidelines, sometimes recommendations change over time as research supports it. (For example, mammograms used to be recommended annually. Even though Medicare will still pay for an annual mammogram, the newer guidelines recommend a mammogram every two years for women of average risk). Of course, you and your doctor may decide to screen more often for some diseases, based on your risk and your co-morbidities (chronic disease you are already diagnosed with). Preventive services for you include:  - Medicare offers their members a free annual wellness visit, which is time for you and your primary care provider to discuss and plan for your preventive service needs. Take advantage of this benefit every year!  -All adults over the age of 72 should receive the recommended pneumonia vaccines. Current USPSTF guidelines recommend a series of two vaccines for the best pneumonia protection.   -All adults should have a flu vaccine yearly and a tetanus vaccine every 10 years.   -All adults age 48 and older should receive the shingles vaccines (series of two vaccines).       -All adults age 38-68 who are overweight should have a diabetes screening test once every three years.   -All adults born between 80 and 1965 should be screened once for Hepatitis C.  -Other screening tests and preventive services for persons with diabetes include: an eye exam to screen for diabetic retinopathy, a kidney function test, a foot exam, and stricter control over your cholesterol.   -Cardiovascular screening for adults with routine risk involves an electrocardiogram (ECG) at intervals determined by your doctor.   -Colorectal cancer screenings should be done for adults age 54-65 with no increased risk factors for colorectal cancer. There are a number of acceptable methods of screening for this type of cancer. Each test has its own benefits and drawbacks. Discuss with your doctor what is most appropriate for you during your annual wellness visit. The different tests include: colonoscopy (considered the best screening method), a fecal occult blood test, a fecal DNA test, and sigmoidoscopy.    -A bone mass density test is recommended when a woman turns 65 to screen for osteoporosis. This test is only recommended one time, as a screening. Some providers will use this same test as a disease monitoring tool if you already have osteoporosis. -Breast cancer screenings are recommended every other year for women of normal risk, age 54-69.  -Cervical cancer screenings for women over age 72 are only recommended with certain risk factors.      Here is a list of your current Health Maintenance items (your personalized list of preventive services) with a due date:  Health Maintenance Due   Topic Date Due    Hepatitis C Test  Never done    COVID-19 Vaccine (1) Never done    DTaP/Tdap/Td  (1 - Tdap) Never done    Cervical cancer screen  Never done    Cholesterol Test   Never done    Colorectal Screening  Never done    Shingles Vaccine (1 of 2) Never done    Smoker or Former Smoker - [address]  Never done    Mammogram  Never done   Orgdot Flu Vaccine (1) 09/01/2021    Bone Mineral Density   Never done    Pneumococcal Vaccine (1 of 1 - PPSV23) Never done         Medicare Wellness Visit, Female     The best way to live healthy is to have a lifestyle where you eat a well-balanced diet, exercise regularly, limit alcohol use, and quit all forms of tobacco/nicotine, if applicable. Regular preventive services are another way to keep healthy. Preventive services (vaccines, screening tests, monitoring & exams) can help personalize your care plan, which helps you manage your own care. Screening tests can find health problems at the earliest stages, when they are easiest to treat. Luca follows the current, evidence-based guidelines published by the [facility] Mahesh Izaguirre ([facility]) when recommending preventive services for our patients. Because we follow these guidelines, sometimes recommendations change over time as research supports it. (For example, mammograms used to be recommended annually. Even though Medicare will still pay for an annual mammogram, the newer guidelines recommend a mammogram every two years for women of average risk). Of course, you and your doctor may decide to screen more often for some diseases, based on your risk and your co-morbidities (chronic disease you are already diagnosed with). Preventive services for you include:  - Medicare offers their members a free annual wellness visit, which is time for you and your primary care provider to discuss and plan for your preventive service needs. Take advantage of this benefit every year!  -All adults over the age of 72 should receive the recommended pneumonia vaccines.  Current USPSTF guidelines recommend a series of two vaccines for the best pneumonia protection.   -All adults should have a flu vaccine yearly and a tetanus vaccine every 10 years.   -All adults age 48 and older should receive the shingles vaccines (series of two vaccines). -All adults age 38-68 who are overweight should have a diabetes screening test once every three years.   -All adults born between 80 and 1965 should be screened once for Hepatitis C.  -Other screening tests and preventive services for persons with diabetes include: an eye exam to screen for diabetic retinopathy, a kidney function test, a foot exam, and stricter control over your cholesterol.   -Cardiovascular screening for adults with routine risk involves an electrocardiogram (ECG) at intervals determined by your doctor.   -Colorectal cancer screenings should be done for adults age 54-65 with no increased risk factors for colorectal cancer. There are a number of acceptable methods of screening for this type of cancer. Each test has its own benefits and drawbacks. Discuss with your doctor what is most appropriate for you during your annual wellness visit. The different tests include: colonoscopy (considered the best screening method), a fecal occult blood test, a fecal DNA test, and sigmoidoscopy.    -A bone mass density test is recommended when a woman turns 65 to screen for osteoporosis. This test is only recommended one time, as a screening. Some providers will use this same test as a disease monitoring tool if you already have osteoporosis. -Breast cancer screenings are recommended every other year for women of normal risk, age 54-69.  -Cervical cancer screenings for women over age 72 are only recommended with certain risk factors.      Here is a list of your current Health Maintenance items (your personalized list of preventive services) with a due date:  Health Maintenance Due   Topic Date Due    Hepatitis C Test  Never done    COVID-19 Vaccine (1) Never done    DTaP/Tdap/Td  (1 - Tdap) Never done    Cervical cancer screen  Never done    Cholesterol Test   Never done    Colorectal Screening  Never done    Shingles Vaccine (1 of 2) Never done    Smoker or Former Smoker - Annual Lung Cancer Screen  Never done    Mammogram  Never done    Yearly Flu Vaccine (1) 09/01/2021    Bone Mineral Density   Never done    Pneumococcal Vaccine (1 of 1 - PPSV23) Never done

## 2021-09-20 NOTE — PROGRESS NOTES
Order placed for 6 minute walk test, per Verbal Order from Dr. Abel Zimmerman on 9/20/2021. Last office visit: 9/10/2021  Follow up Visit: 9/27/2021    Provider is aware of last office visit and follow up. No further action requested from provider.

## 2021-09-22 RX ORDER — BUDESONIDE AND FORMOTEROL FUMARATE DIHYDRATE 160; 4.5 UG/1; UG/1
1 AEROSOL RESPIRATORY (INHALATION) 2 TIMES DAILY
Qty: 1 EACH | Refills: 2 | Status: SHIPPED | OUTPATIENT
Start: 2021-09-22 | End: 2022-10-28 | Stop reason: SDUPTHER

## 2021-09-22 NOTE — PROGRESS NOTES
Called and discussed result with patient. Informed her of (+)PET CT with enlarging RUL mass. Informed her of need for Bronchoscopy/EBUS which she is amenable to at this time. Plan is to schedule for 10/5/21 at 10:30 am. In the interim, she will see me in clinic on 9/27/21. Will start her on Symbicort as well and instructed her to rinse mouth after each use. Recommend obtaining COVID-19 vaccine.

## 2021-09-23 DIAGNOSIS — R91.8 MASS OF RIGHT LUNG: ICD-10-CM

## 2021-09-23 DIAGNOSIS — R93.89 ABNORMAL CT SCAN, CHEST: Primary | ICD-10-CM

## 2021-09-23 DIAGNOSIS — R06.09 DYSPNEA ON EXERTION: ICD-10-CM

## 2021-09-23 DIAGNOSIS — Z01.812 ENCOUNTER FOR PRE-OPERATIVE LABORATORY TESTING: ICD-10-CM

## 2021-09-23 DIAGNOSIS — R06.02 SOB (SHORTNESS OF BREATH): ICD-10-CM

## 2021-09-23 NOTE — PROGRESS NOTES
Order placed for Bronch labs, per Verbal Order from Dr. Lizzy Valerio on 9/23/2021. Last office visit: 9/10/2021  Follow up Visit: 10/4/2021 (EBUS)    Provider is aware of last office visit and follow up. No further action requested from provider.

## 2021-09-24 ENCOUNTER — HOSPITAL ENCOUNTER (OUTPATIENT)
Dept: MAMMOGRAPHY | Age: 66
Discharge: HOME | End: 2021-09-24
Attending: INTERNAL MEDICINE
Payer: MEDICARE

## 2021-09-24 ENCOUNTER — HOSPITAL ENCOUNTER (OUTPATIENT)
Dept: LAB | Age: 66
End: 2021-09-24
Attending: INTERNAL MEDICINE

## 2021-09-24 ENCOUNTER — HOSPITAL ENCOUNTER (OUTPATIENT)
Dept: LAB | Age: 66
Discharge: HOME | End: 2021-09-24
Attending: INTERNAL MEDICINE

## 2021-09-24 DIAGNOSIS — Z12.31 ENCOUNTER FOR SCREENING MAMMOGRAM FOR MALIGNANT NEOPLASM OF BREAST: ICD-10-CM

## 2021-09-24 PROCEDURE — 77067 SCR MAMMO BI INCL CAD: CPT

## 2021-09-27 ENCOUNTER — OFFICE VISIT (OUTPATIENT)
Dept: PULMONOLOGY | Age: 66
End: 2021-09-27
Payer: MEDICARE

## 2021-09-27 VITALS
TEMPERATURE: 97.8 F | SYSTOLIC BLOOD PRESSURE: 119 MMHG | DIASTOLIC BLOOD PRESSURE: 65 MMHG | WEIGHT: 204.6 LBS | RESPIRATION RATE: 18 BRPM | HEART RATE: 68 BPM | BODY MASS INDEX: 32.11 KG/M2 | HEIGHT: 67 IN | OXYGEN SATURATION: 98 %

## 2021-09-27 DIAGNOSIS — R06.09 DYSPNEA ON EXERTION: ICD-10-CM

## 2021-09-27 DIAGNOSIS — R91.8 LUNG MASS: Primary | ICD-10-CM

## 2021-09-27 DIAGNOSIS — Z72.0 TOBACCO ABUSE: ICD-10-CM

## 2021-09-27 PROCEDURE — 99215 OFFICE O/P EST HI 40 MIN: CPT | Performed by: INTERNAL MEDICINE

## 2021-09-27 RX ORDER — NICOTINE 7MG/24HR
1 PATCH, TRANSDERMAL 24 HOURS TRANSDERMAL EVERY 24 HOURS
Qty: 14 PATCH | Refills: 0 | Status: SHIPPED | OUTPATIENT
Start: 2021-11-12 | End: 2021-11-26

## 2021-09-27 RX ORDER — IBUPROFEN 200 MG
1 TABLET ORAL EVERY 24 HOURS
Qty: 45 PATCH | Refills: 0 | Status: SHIPPED | OUTPATIENT
Start: 2021-10-06 | End: 2021-11-20

## 2021-09-27 NOTE — PROGRESS NOTES
Casper Mathur presents today for   Chief Complaint   Patient presents with    Abnormal CT Scan     follow up from 9/10/2021    Lung Mass     right    Breathing Problem     GOMEZ    Results     PET/CT 9/17/2021       Is someone accompanying this pt? No    Is the patient using any DME equipment during OV? No    -DME Company N/A    Depression Screening:  3 most recent PHQ Screens 9/27/2021   Little interest or pleasure in doing things Not at all   Feeling down, depressed, irritable, or hopeless Not at all   Total Score PHQ 2 0       Learning Assessment:  Learning Assessment 9/10/2021   PRIMARY LEARNER Patient   PRIMARY LANGUAGE ENGLISH   LEARNER PREFERENCE PRIMARY DEMONSTRATION   ANSWERED BY Patient   RELATIONSHIP SELF       Abuse Screening:  Abuse Screening Questionnaire 9/20/2021   Do you ever feel afraid of your partner? N   Are you in a relationship with someone who physically or mentally threatens you? N   Is it safe for you to go home? Y       Fall Risk  Fall Risk Assessment, last 12 mths 9/27/2021   Able to walk? Yes   Fall in past 12 months? 0   Do you feel unsteady? 0   Are you worried about falling 0         Coordination of Care:  1. Have you been to the ER, urgent care clinic since your last visit? Hospitalized since your last visit? No    2. Have you seen or consulted any other health care providers outside of the [address] [place] [address] since your last visit? Include any pap smears or colon screening. Yes.  Dr. Melodie Raygoza, PCP

## 2021-09-27 NOTE — PROGRESS NOTES
Pt plans to go Friday to [employer] or another off site on Friday.  Aware of results needed day of procedure

## 2021-09-27 NOTE — PROGRESS NOTES
[facility]  Pulmonary, Critical Care, and Sleep Medicine      Pulmonary Office Progress Notes. Name: Odessia Goltz     : 1955     Date: 2021        Subjective:     Patient is a 73 y.o. female is here for follow up for: RUL lung mass. 21     Today in clinic, she states that her breathing has improved slightly but reports continued GOMEZ. Worse with walking at a brisk pace. Currently on Symbicort which she is taking BID and rinsing her mouth after each use. States she has noticed that her breathing is improved. She states her sleep has improved. Cough is intermittently productive of yellow phlegm in the morning. Cough frequency has decreased during the day. No fevers, chills. Has not had a stress test in the past.  Tobacco abuse - smoking 1/2 ppd. Willing to ry the patches at this time. Has not yet had a chance to complete PFT, SMW and labwork from prior visit. Plans to complete later this week. Lung mass - completed PET/CT scan. Scan reviewed with her in the office today. Patient became teary and asked if she is going to make. Re-assured her that we need to complete work-up for accurate diagnosis and. She also notes the recent losses she has had over the past year. States she has support from family and her /[employer] but has not been able to go back since her diagnosis of COVID-19.     2021  She states that she has some shortness of breath which has been present since COVID diagnosis. Prior to this, some intermittent SOB, along with shoulder and neck pain. Reports worsening of the Rt shoulder pain recently. She admits to a cough intermittently productive of clear phlegm which has been present for the past two years. She is able to walk 50-75 feet before getting short of breath currently. Admits to wheezing for a couple of years. She also admits to a weight loss of 20 Ibs in the past month. Attributes this to not being able to eat.  She denies fever, chills, night sweats. No hemoptysis. Denies history of breathing issues as child and denies Asthma history. Family history of brain cancer in uncle; dad with merkel cell - now . Colon Ca & and cervical cancer also present in the family. Currently on Albuterol inhaler; using q4-6 hours which provides some relief. Tobacco: smoking <1 ppd; previously smoked 2ppd x 40 years. Has tried to quit smoking. Past Medical History:   Diagnosis Date    Abnormal CXR     Arthritis     COVID-19     Menopause        Allergies   Allergen Reactions    Iodine And Iodide Containing Products Hives       Current Outpatient Medications   Medication Sig Dispense Refill    [START ON 10/6/2021] nicotine (NICODERM CQ) 14 mg/24 hr patch 1 Patch by TransDERmal route every twenty-four (24) hours for 45 days. 45 Patch 0    [START ON 2021] nicotine (NICODERM CQ) 7 mg/24 hr 1 Patch by TransDERmal route every twenty-four (24) hours for 14 days. 14 Patch 0    budesonide-formoteroL (Symbicort) 160-4.5 mcg/actuation HFAA Take 1 Puff by inhalation two (2) times a day. 1 Each 2    triamcinolone acetonide (KENALOG) 0.1 % topical cream Apply  to affected area four (4) times daily as needed.  cyclobenzaprine (FLEXERIL) 10 mg tablet Take 1 Tablet by mouth three (3) times daily as needed for Muscle Spasm(s). 60 Tablet 1    albuterol (PROVENTIL HFA, VENTOLIN HFA, PROAIR HFA) 90 mcg/actuation inhaler Take 2 Puffs by inhalation every six (6) hours as needed for Wheezing. 1 Each 5    methocarbamoL (ROBAXIN) 500 mg tablet Take 1 Tablet by mouth four (4) times daily.  30 Tablet 2       Review of Systems:  HEENT: No epistaxis, no nasal drainage, no difficulty in swallowing, no redness in eyes  Respiratory: as above  Cardiovascular: no chest pain, no palpitations, no chronic leg edema, no syncope  Gastrointestinal: no abd pain, no vomiting, no diarrhea, no bleeding symptoms  Genitourinary: No urinary symptoms or hematuria  Integument/breast: No ulcers or rashes  Musculoskeletal: Neg  Neurological: No focal weakness, no seizures, no headaches  Behvioral/Psych: No anxiety, +Depression  Constitutional: No fever, no chills, no weight loss, no night sweats     Objective:     Visit Vitals  /65 (BP 1 Location: Left upper arm, BP Patient Position: Sitting, BP Cuff Size: Adult long)   Pulse 68   Temp 97.8 °F (36.6 °C) (Temporal)   Resp 18   Ht 5' 7\" (1.702 m)   Wt 92.8 kg (204 lb 9.6 oz)   SpO2 98%   BMI 32.04 kg/m²        Physical Exam:   General: comfortable, no acute distress  HEENT: pupils reactive, sclera anicteric, EOM intact. (+)Mallampati III. (+)Upper & lower dentures. Neck: No adenopathy or thyroid swelling, no JVD, supple  CVS: S1S2 no murmurs  RS: Lungs CTA, no wheezes or rhonchi. No tachypnea no accessory muscle use  Abd: soft, non tender, BS normal  Neuro: non focal, awake, alert  Extrm: no leg edema, clubbing or cyanosis  Skin: no rash    Data review:     Admission on 08/11/2021, Discharged on 08/11/2021   Component Date Value Ref Range Status    Influenza A Antigen 08/11/2021 Negative  Negative   Final    Influenza B Antigen 08/11/2021 Negative  Negative   Final    Specimen source 08/11/2021 Nasopharyngeal    Final    COVID-19 rapid test 08/11/2021 DETECTED* Not Detected   Final    CALLED TO AND READ BACK BY LATOYA HUNTER ER AT 1138 8/11/21 BRIE ORO   The specimen is POSITIVE for SARS-CoV-2, the novel coronavirus associated with COVID-19. This test has been authorized by the FDA under an Emergency Use Authorization (EUA) for use by authorized laboratories.    Fact sheet for Healthcare Providers: ConventionUpdate.co.nz Fact sheet for Patients: ConventionUpdate.co.nz   Methodology: Isothermal Nucleic Acid Amplification    Sodium 08/11/2021 136  135 - 145 mmol/L Final    Potassium 08/11/2021 3.2  3.2 - 5.1 mmol/L Final    Chloride 08/11/2021 99  94 - 111 mmol/L Final    CO2 08/11/2021 24  21 - 33 mmol/L Final    Anion gap 08/11/2021 13  mmol/L Final    Glucose 08/11/2021 118* 70 - 110 mg/dL Final    BUN 08/11/2021 13  9 - 21 mg/dL Final    Creatinine 08/11/2021 0.60* 0.70 - 1.20 mg/dL Final    BUN/Creatinine ratio 08/11/2021 22    Final    GFR est AA 08/11/2021 >60  ml/min/1.73m2 Final    GFR est non-AA 08/11/2021 >60  ml/min/1.73m2 Final    Comment: Estimated GFR is calculated using the IDMS-traceable Modification of Diet in Renal Disease (MDRD) Study equation, reported for both  Americans (GFRAA) and non- Americans (GFRNA), and normalized to 1.73m2 body surface area. The physician must decide which value applies to the patient. The MDRD study equation should only be used in individuals age 25 or older. It has not been validated for the following: pregnant women, patients with serious comorbid conditions, or on certain medications, or persons with extremes of body size, muscle mass, or nutritional status.  Calcium 08/11/2021 9.1  8.5 - 10.5 mg/dL Final    WBC 08/11/2021 5.1  4.6 - 13.2 K/uL Final    RBC 08/11/2021 5.04  4.20 - 5.30 M/uL Final    HGB 08/11/2021 15.3  12.0 - 16.0 g/dL Final    HCT 08/11/2021 44.4  35.0 - 45.0 % Final    MCV 08/11/2021 88.1  74.0 - 97.0 FL Final    MCH 08/11/2021 30.4  24.0 - 34.0 PG Final    MCHC 08/11/2021 34.5  31.0 - 37.0 g/dL Final    RDW 08/11/2021 12.7  11.6 - 14.5 % Final    PLATELET 50/42/7673 096  135 - 420 K/uL Final    MPV 08/11/2021 11.1  9.2 - 11.8 FL Final    NEUTROPHILS 08/11/2021 58  40 - 73 % Final    LYMPHOCYTES 08/11/2021 35  21 - 52 % Final    MONOCYTES 08/11/2021 7  3 - 10 % Final    EOSINOPHILS 08/11/2021 0  0 - 5 % Final    BASOPHILS 08/11/2021 0  0 - 2 % Final    IMMATURE GRANULOCYTES 08/11/2021 0  % Final    ABS. NEUTROPHILS 08/11/2021 2.9  1.8 - 8.0 K/UL Final    ABS. LYMPHOCYTES 08/11/2021 1.8  0.9 - 3.6 K/UL Final    ABS. MONOCYTES 08/11/2021 0.3  0.05 - 1.2 K/UL Final    ABS.  EOSINOPHILS 08/11/2021 0.0  0.0 - 0.4 K/UL Final    ABS. BASOPHILS 08/11/2021 0.0  0.0 - 0.1 K/UL Final    ABS. IMM. GRANS. 08/11/2021 0.0  K/UL Final    Protein, total 08/11/2021 7.6  6.1 - 8.4 g/dL Final    Albumin 08/11/2021 4.4  3.5 - 4.7 g/dL Final    Globulin 08/11/2021 3.2  g/dL Final    A-G Ratio 08/11/2021 1.4    Final    Bilirubin, total 08/11/2021 0.8  0.2 - 1.0 mg/dL Final    Bilirubin, direct 08/11/2021 0.2  0.0 - 0.3 mg/dL Final    Alk. phosphatase 08/11/2021 72  38 - 126 U/L Final    AST (SGOT) 08/11/2021 26  14 - 74 U/L Final    ALT (SGPT) 08/11/2021 31  3 - 52 U/L Final    Lactic acid 08/11/2021 1.0  0.5 - 2.0 mmol/L Final       Date FVC FEV1  FEV1/FVC EOY20-86 TLC RV RV/TLC VC DLCO                                                     Imaging:  I have personally reviewed the patients radiographs and have reviewed the reports:  XR Results (most recent):  Results from Hospital Encounter encounter on 08/11/21    XR CHEST PORT    Narrative  EXAM: XR CHEST PORT    CLINICAL INDICATION/HISTORY: covid exposure  -Additional: None    COMPARISON: 12/14/2015    TECHNIQUE: Portable frontal view of the chest    _______________    FINDINGS:    SUPPORT DEVICES: None. HEART AND MEDIASTINUM: Cardiomediastinal silhouette within normal limits. LUNGS AND PLEURAL SPACES: Right suprahilar paramediastinal masslike  consolidation. No large effusion or pneumothorax.    _______________    Impression  Right suprahilar paramediastinal masslike consolidation, concerning for possible  underlying mass. Recommend further evaluation with dedicated CT chest.    CT Results (most recent):  Results from Hospital Encounter encounter on 08/11/21    CT CHEST WO CONT    Narrative  EXAM: CT Chest    INDICATION: Shortness of breath, abnormal chest radiograph. COMPARISON: Chest radiographs 8/11/2011. TECHNIQUE: Axial CT imaging from the thoracic inlet through the diaphragm  Without intravenous contrast. Multiplanar reformations were generated.     One or more dose reduction techniques were used on this CT: automated exposure  control, adjustment of the mAs and/or kVp according to patient size, and  iterative reconstruction techniques. The specific techniques used on this CT  exam have been documented in the patient's electronic medical record. Digital  Imaging and Communications in Medicine (DICOM) format image data are available  to nonaffiliated external healthcare facilities or entities on a secure, media  free, reciprocally searchable basis with patient authorization for at least a  12-month period after this study. _______________    FINDINGS:    LUNGS:  > Within the paramedian right upper lobe, there is a tissue mass which is  estimated to measure approximately 4.1 x 4.4 by 5.5 cm in size (greatest  diameter craniocaudal). This mass is noted to invade the adjacent medial  mediastinal pleura. Localized spiculations noted across the anterior and  superior pleural surfaces with faint surrounding groundglass opacity. > There is mild and diffuse mosaic attenuation of the pulmonary parenchyma  noted.  > Scattered areas of subsegmental atelectasis noted at the lung bases. > 3 mm subpleural right lower lobe pulmonary nodule (image 218)    PLEURA: No pneumothorax or pleural effusion. AIRWAY: Diffuse bronchial wall thickening. MEDIASTINUM: Included thyroid gland is unremarkable. Thoracic aorta normal in  course/caliber. Normal cardiac size. No pericardial effusion. LYMPH NODES: No supraclavicular or axillary lymph node enlargement. Enlarged  precarinal lymph node (image 40) measuring approximately 1.4 cm in short axis  dimension. No discretely enlarged hilar lymph nodes. Several calcified left  hilar lymph nodes in keeping with healed granulomatous disease. UPPER ABDOMEN: Scattered splenic granulomata, otherwise unremarkable. OSSEOUS STRUCTURES: No acute or aggressive appearing osseous abnormality. OTHER:    _______________    Impression  1. Right superhilar mass measuring 4.1 x 4.4 x 5.5 cm in size highly concerning  for potential bronchogenic neoplasm.  > Invasion of the adjacent medial mediastinal pleura is noted with precarinal  adenopathy.  > No pleural effusion. 2. Diffuse mosaic attenuation of the pulmonary parenchyma and mild bronchial  wall thickening; findings which can be seen in the context of small airways  disease. 3. Small (3 mm) subpleural right lower lobe pulmonary nodule, attention on  follow-up imaging recommended.    ========    Fleischner Society Pulmonary Nodule Guidelines (revised 2017): Solid nodule >8 mm: Consider CT in 3 months, PET-CT, or tissue sampling. Pulmonary or Thoracic surgery consultation is also recommended for management. There is no problem list on file for this patient. IMPRESSION:   · Lung mass: RUL 5.5 x 4.4 x 4.1 cm mass first noted on 8/11/21. PET/CT 9/10/21 with FDG avidity (max SUV 15.8) w/ mediastinal adenopathy; mass also slightly enlarged from prior CT chest and w/ invasion/abudding right mediastinum. · Dyspnea on exertion: suspect secondary to obstructive ventilatory impairment. PASC may be contributory. Breathing improved on LABA/ICS therapy. · Tobacco abuse: 1/2 ppd smoker; >80 pack year history  · Dysthymia      RECOMMENDATIONS:   · Plan for Bronchoscopy w/ EBUS +/- Biopsy. Risks including but not limited to infection, bleeding, need for blood products, injury to airway/bronchial tubes, need for mechanical ventilation and death discussed with patient in the office today. She demonstrated understanding and would like to go ahead with the procedure. Her son will be present with her and she requests that he be given information about intra-procedure findings. · COVID-19 testing prior to procedure ordered  · Pharmacology - continue LABA/ICS/DERECK therapy; Symbicort 160/4.5 mcg 1 puff BID. Ct Proventil rescue inhaler as needed for SOB as patient benefiting from this.  May be a candidate for LABA/ICS/LAMA therapy pending pulmonary function testing  · Complete PFT and SMW  · Obtain TTE  · Discussed need for referral to Oncology pending Bronch/biopsys findings.   · Start on Nicotine patches with goal of tobacco cessation - 14 mg/day x6 weeks then 7 mg/day x2 weeks  · Recommended vaccination against COVID-19 - discussed w/ patient  · Discuss with PCP regarding Dysthymia/Depression and need for CBT +/- pharmacotherapy        Hoang Devries DO   09/27/21  Pulmonary, Critical Care Medicine  [address] [place] [address] [facility]

## 2021-09-27 NOTE — PATIENT INSTRUCTIONS
What is the plan?  -Plan for Bronchoscopy procedure next week. Risks as discussed.  -Continue Symbicort as prescribed - rinse mouth after each use  -Continue use of your Proventil inhaler eery 2-4 hours as needed for shortness of breath  -Complete labwork as ordered  -Start on Nicotine patch as discussed  -Call 1-800-QUIT-NOW for help with quitting smoking  -Plan to complete ultrasound of your heart and pulmonary function test after your procedure  -Talk with PCP about counseling/depression     Bronchoscopy: Before Your Procedure  What is bronchoscopy? Bronchoscopy (say \"bron-KOSS-koh-pee\") is a type of procedure. Your doctor uses a flexible tube to look at your airway. This tube is called a bronchoscope. It lets your doctor see your throat, voice box (larynx), windpipe (trachea), and bronchial tubes. There are many reasons to have this procedure. Your doctor may look for problems with your airway. Or he or she may remove an object or growth. Your doctor could also take a sample of tissue to study. This is called a biopsy. You will probably be awake for the procedure. But you will get medicine so you will not have pain. The doctor puts the bronchoscope into your mouth or nose and down your throat. Most people go home the same day. You will probably be able to go back to work or your normal routine in 1 or 2 days. Follow-up care is a key part of your treatment and safety. Be sure to make and go to all appointments, and call your doctor if you are having problems. It's also a good idea to know your test results and keep a list of the medicines you take. How do you prepare for the procedure? Procedures can be stressful. This information will help you understand what you can expect. And it will help you safely prepare for your procedure. Preparing for the procedure    · Your doctor will tell you how soon before the procedure to stop eating and drinking.  Follow the instructions exactly about when to stop eating and drinking, or your procedure may be canceled. · Be sure you have someone to take you home. Anesthesia and pain medicine will make it unsafe for you to drive or get home on your own.     · Understand exactly what procedure is planned, along with the risks, benefits, and other options.     · Tell your doctor ALL the medicines, vitamins, supplements, and herbal remedies you take. Some may increase the risk of problems during your procedure. Your doctor will tell you if you should stop taking any of them before the procedure and how soon to do it.     · If you take aspirin or some other blood thinner, ask your doctor if you should stop taking it before your procedure. Make sure that you understand exactly what your doctor wants you to do. These medicines increase the risk of bleeding.     · Make sure your doctor and the hospital have a copy of your advance directive. If you don't have one, you may want to prepare one. It lets others know your health care wishes. It's a good thing to have before any type of surgery or procedure. What happens on the day of the procedure? · Follow the instructions exactly about when to stop eating and drinking. If you don't, your procedure may be canceled. If your doctor told you to take your medicines on the day of the procedure, take them with only a sip of water.     · Take a bath or shower before you come in for your procedure. Do not apply lotions, perfumes, deodorants, or nail polish.     · Take off all jewelry and piercings. And take out contact lenses, if you wear them. At the hospital or surgery center   · Bring a picture ID.     · You will be kept comfortable and safe by your anesthesia provider. The anesthesia may make you sleep. Or it may just numb the area being worked on.     · The procedure will take about 30 to 60 minutes.     · You will be in recovery for 1 to 3 hours after the procedure. When should you call your doctor?    · You have questions or concerns.     · You don't understand how to prepare for your procedure.     · You become ill before the procedure (such as fever, flu, or a cold).     · You need to reschedule or have changed your mind about having the procedure. Where can you learn more? Go to http://www.gray.com/  Enter K372 in the search box to learn more about \"Bronchoscopy: Before Your Procedure. \"  Current as of: July 6, 2021               Content Version: 13.0  © 2006-2021 [employer]. Care instructions adapted under license by [employer] (which disclaims liability or warranty for this information). If you have questions about a medical condition or this instruction, always ask your healthcare professional. [address] any warranty or liability for your use of this information.

## 2021-09-28 ENCOUNTER — TELEPHONE (OUTPATIENT)
Dept: INTERNAL MEDICINE CLINIC | Age: 66
End: 2021-09-28

## 2021-10-01 ENCOUNTER — TELEPHONE (OUTPATIENT)
Dept: PULMONOLOGY | Age: 66
End: 2021-10-01

## 2021-10-01 ENCOUNTER — HOSPITAL ENCOUNTER (OUTPATIENT)
Dept: PREADMISSION TESTING | Age: 66
Discharge: HOME | End: 2021-10-01
Payer: MEDICARE

## 2021-10-01 ENCOUNTER — HOSPITAL ENCOUNTER (OUTPATIENT)
Dept: LAB | Age: 66
Discharge: HOME | End: 2021-10-01

## 2021-10-01 DIAGNOSIS — R91.8 LUNG NODULES: ICD-10-CM

## 2021-10-01 DIAGNOSIS — J44.9 CHRONIC OBSTRUCTIVE PULMONARY DISEASE, UNSPECIFIED COPD TYPE (HCC): ICD-10-CM

## 2021-10-01 DIAGNOSIS — R06.02 SOBOE (SHORTNESS OF BREATH ON EXERTION): ICD-10-CM

## 2021-10-01 DIAGNOSIS — J44.9 ASTHMA WITH COPD (HCC): ICD-10-CM

## 2021-10-01 DIAGNOSIS — Z87.891 PERSONAL HISTORY OF TOBACCO USE: Primary | ICD-10-CM

## 2021-10-01 LAB — SARS-COV-2, COV2: NORMAL

## 2021-10-01 PROCEDURE — 99001 SPECIMEN HANDLING PT-LAB: CPT

## 2021-10-01 PROCEDURE — U0003 INFECTIOUS AGENT DETECTION BY NUCLEIC ACID (DNA OR RNA); SEVERE ACUTE RESPIRATORY SYNDROME CORONAVIRUS 2 (SARS-COV-2) (CORONAVIRUS DISEASE [COVID-19]), AMPLIFIED PROBE TECHNIQUE, MAKING USE OF HIGH THROUGHPUT TECHNOLOGIES AS DESCRIBED BY CMS-2020-01-R: HCPCS

## 2021-10-01 RX ORDER — ACETAMINOPHEN 500 MG
1000 TABLET ORAL
COMMUNITY
End: 2022-10-21

## 2021-10-01 RX ORDER — IBUPROFEN 200 MG
800 TABLET ORAL AS NEEDED
COMMUNITY

## 2021-10-01 NOTE — PERIOP NOTES
PRE-SURGICAL INSTRUCTIONS        Patient's Name:  Sagrario Jones      Today's Date:  10/1/2021            Covid Testing Date and Time:    Surgery Date:  10/5/2021                1. Do NOT eat or drink anything, including candy, gum, or ice chips after midnight on 10/4/2021, unless you have specific instructions from your surgeon or anesthesia provider to do so.  2. You may brush your teeth before coming to the hospital.  3. No smoking 24 hours prior to the day of surgery. 4. No alcohol 24 hours prior to the day of surgery. 5. No recreational drugs for one week prior to the day of surgery. 6. Leave all valuables, including money/purse, at home. 7. Remove all jewelry, nail polish, acrylic nails, and makeup (including mascara); no lotions powders, deodorant, or perfume/cologne/after shave on the skin. 8. Follow instruction for Hibiclens washes and CHG wipes from surgeon's office. 9. Glasses/contact lenses and dentures may be worn to the hospital.  They will be removed prior to surgery. 10. Call your doctor if symptoms of a cold or illness develop within 24-48 hours prior to your surgery. 11.  If you are having an outpatient procedure, please make arrangements for a responsible ADULT TO [address] [place] [address] and stay with you for 24 hours after your surgery. 12. ONE VISITOR in the hospital at this time for outpatient procedures. Exceptions may be made for surgical admissions, per nursing unit guidelines      Special Instructions:      Bring list of CURRENT medications. Bring inhaler. Bring any pertinent legal medical records. On the day of surgery, come in the main entrance of [facility]. Let the  at the desk know you are there for surgery. A staff member will come escort you to the surgical area on the second floor.     If you have any questions or concerns, please do not hesitate to call:     (Prior to the day of surgery) [facility] department:  [phone]   (Day of surgery) Pre-Op department:  [phone]    These surgical instructions were reviewed with patient during the PAT phone call.

## 2021-10-01 NOTE — TELEPHONE ENCOUNTER
Pt calling our office today to let the nurse know that she got her covid testing done at [facility] and that the results would be in the system. She also had her lab work done today. There needs to be another order placed so that she may get covid testing next week for PFT in our office on 10/11/21.

## 2021-10-01 NOTE — TELEPHONE ENCOUNTER
Order placed for covid test, per Verbal Order from Dr. Eliezer Aldana on 10/1/2021. Last office visit: 9/27/2021  Follow up Visit: 10/11/2021 (PFT & 6 MWT)    Provider is aware of last office visit and follow up. No further action requested from provider.

## 2021-10-02 LAB
APTT PPP: 27 SEC (ref 24–33)
BASOPHILS # BLD AUTO: 0.1 X10E3/UL (ref 0–0.2)
BASOPHILS NFR BLD AUTO: 1 %
EOSINOPHIL # BLD AUTO: 0.3 X10E3/UL (ref 0–0.4)
EOSINOPHIL NFR BLD AUTO: 4 %
ERYTHROCYTE [DISTWIDTH] IN BLOOD BY AUTOMATED COUNT: 12.9 % (ref 11.7–15.4)
HCT VFR BLD AUTO: 43.6 % (ref 34–46.6)
HGB BLD-MCNC: 14.6 G/DL (ref 11.1–15.9)
IMM GRANULOCYTES # BLD AUTO: 0 X10E3/UL (ref 0–0.1)
IMM GRANULOCYTES NFR BLD AUTO: 0 %
INR PPP: 1 (ref 0.9–1.2)
LYMPHOCYTES # BLD AUTO: 2.6 X10E3/UL (ref 0.7–3.1)
LYMPHOCYTES NFR BLD AUTO: 39 %
MCH RBC QN AUTO: 30.4 PG (ref 26.6–33)
MCHC RBC AUTO-ENTMCNC: 33.5 G/DL (ref 31.5–35.7)
MCV RBC AUTO: 91 FL (ref 79–97)
MONOCYTES # BLD AUTO: 0.5 X10E3/UL (ref 0.1–0.9)
MONOCYTES NFR BLD AUTO: 8 %
NEUTROPHILS # BLD AUTO: 3.2 X10E3/UL (ref 1.4–7)
NEUTROPHILS NFR BLD AUTO: 48 %
PLATELET # BLD AUTO: 320 X10E3/UL (ref 150–450)
PROTHROMBIN TIME: 10 SEC (ref 9.1–12)
RBC # BLD AUTO: 4.81 X10E6/UL (ref 3.77–5.28)
SARS-COV-2, COV2NT: NOT DETECTED
SPECIMEN STATUS REPORT, ROLRST: NORMAL
WBC # BLD AUTO: 6.6 X10E3/UL (ref 3.4–10.8)

## 2021-10-04 ENCOUNTER — ANESTHESIA EVENT (OUTPATIENT)
Dept: ENDOSCOPY | Age: 66
End: 2021-10-04
Payer: MEDICARE

## 2021-10-04 LAB
GAMMA INTERFERON BACKGROUND BLD IA-ACNC: 0.08 IU/ML
M TB IFN-G BLD-IMP: NEGATIVE
M TB IFN-G CD4+ BCKGRND COR BLD-ACNC: 0.08 IU/ML
MITOGEN IGNF BLD-ACNC: >10 IU/ML
QUANTIFERON TB2 AG: 0.08 IU/ML
SERVICE CMNT-IMP: NORMAL

## 2021-10-05 ENCOUNTER — HOSPITAL ENCOUNTER (OUTPATIENT)
Age: 66
Setting detail: OUTPATIENT SURGERY
Discharge: HOME | End: 2021-10-05
Attending: INTERNAL MEDICINE | Admitting: INTERNAL MEDICINE
Payer: MEDICARE

## 2021-10-05 ENCOUNTER — ANESTHESIA (OUTPATIENT)
Dept: ENDOSCOPY | Age: 66
End: 2021-10-05
Payer: MEDICARE

## 2021-10-05 ENCOUNTER — APPOINTMENT (OUTPATIENT)
Dept: GENERAL RADIOLOGY | Age: 66
End: 2021-10-05
Attending: INTERNAL MEDICINE
Payer: MEDICARE

## 2021-10-05 VITALS
OXYGEN SATURATION: 98 % | BODY MASS INDEX: 31.71 KG/M2 | TEMPERATURE: 97.2 F | RESPIRATION RATE: 14 BRPM | DIASTOLIC BLOOD PRESSURE: 56 MMHG | HEIGHT: 67 IN | HEART RATE: 65 BPM | WEIGHT: 202 LBS | SYSTOLIC BLOOD PRESSURE: 114 MMHG

## 2021-10-05 DIAGNOSIS — R91.8 MASS OF RIGHT LUNG: ICD-10-CM

## 2021-10-05 LAB
BRONCH. LAVAGE DIFF.,BR: NORMAL
BRONCH. LAVAGE DIFF.,BR: NORMAL
EOSINOPHIL NFR BRONCH MANUAL: 1 %
EOSINOPHIL NFR BRONCH MANUAL: NORMAL %
LYMPHOCYTES NFR BRONCH MANUAL: 21 %
LYMPHOCYTES NFR BRONCH MANUAL: NORMAL %
MACROPHAGES NFR BRONCH MANUAL: 5 %
MACROPHAGES NFR BRONCH MANUAL: NORMAL %
NEUTROPHILS NFR BRONCH MANUAL: 73 %
NEUTROPHILS NFR BRONCH MANUAL: NORMAL %

## 2021-10-05 PROCEDURE — 2709999900 HC NON-CHARGEABLE SUPPLY: Performed by: INTERNAL MEDICINE

## 2021-10-05 PROCEDURE — 74011250636 HC RX REV CODE- 250/636: Performed by: NURSE ANESTHETIST, CERTIFIED REGISTERED

## 2021-10-05 PROCEDURE — 77030022556 HC FCPS BIOP TIS ENDOSC BSC -B: Performed by: INTERNAL MEDICINE

## 2021-10-05 PROCEDURE — 88172 CYTP DX EVAL FNA 1ST EA SITE: CPT

## 2021-10-05 PROCEDURE — 00520 ANES CLOSED CHEST PX NOS: CPT | Performed by: ANESTHESIOLOGY

## 2021-10-05 PROCEDURE — 00520 ANES CLOSED CHEST PX NOS: CPT | Performed by: NURSE ANESTHETIST, CERTIFIED REGISTERED

## 2021-10-05 PROCEDURE — 88305 TISSUE EXAM BY PATHOLOGIST: CPT

## 2021-10-05 PROCEDURE — 88177 CYTP FNA EVAL EA ADDL: CPT

## 2021-10-05 PROCEDURE — 77030012699 HC VLV SUC CNTRL OCOA -A: Performed by: INTERNAL MEDICINE

## 2021-10-05 PROCEDURE — 77030003400 HC NDL ASPIR BIOP CNMD -B: Performed by: INTERNAL MEDICINE

## 2021-10-05 PROCEDURE — 74011000250 HC RX REV CODE- 250: Performed by: NURSE ANESTHETIST, CERTIFIED REGISTERED

## 2021-10-05 PROCEDURE — 77030003454 HC NDL BIOP BRONCH BSC -B: Performed by: INTERNAL MEDICINE

## 2021-10-05 PROCEDURE — 76040000003: Performed by: INTERNAL MEDICINE

## 2021-10-05 PROCEDURE — 77030009046 HC CATH BRNCH BLLN OCOA -B: Performed by: INTERNAL MEDICINE

## 2021-10-05 PROCEDURE — 76060000035 HC ANESTHESIA 2 TO 2.5 HR: Performed by: INTERNAL MEDICINE

## 2021-10-05 PROCEDURE — 87070 CULTURE OTHR SPECIMN AEROBIC: CPT

## 2021-10-05 PROCEDURE — 77030008683 HC TU ET CUF COVD -A: Performed by: ANESTHESIOLOGY

## 2021-10-05 PROCEDURE — 77030003406 HC NDL ASPIR BIOP OCOA -C: Performed by: INTERNAL MEDICINE

## 2021-10-05 PROCEDURE — 87102 FUNGUS ISOLATION CULTURE: CPT

## 2021-10-05 PROCEDURE — 88342 IMHCHEM/IMCYTCHM 1ST ANTB: CPT

## 2021-10-05 PROCEDURE — 31653 BRONCH EBUS SAMPLNG 3/> NODE: CPT | Performed by: INTERNAL MEDICINE

## 2021-10-05 PROCEDURE — 87205 SMEAR GRAM STAIN: CPT

## 2021-10-05 PROCEDURE — 77030018823 HC SLV COMPR VENO -B: Performed by: INTERNAL MEDICINE

## 2021-10-05 PROCEDURE — 88112 CYTOPATH CELL ENHANCE TECH: CPT

## 2021-10-05 PROCEDURE — 71045 X-RAY EXAM CHEST 1 VIEW: CPT

## 2021-10-05 PROCEDURE — 88173 CYTOPATH EVAL FNA REPORT: CPT

## 2021-10-05 PROCEDURE — 89051 BODY FLUID CELL COUNT: CPT

## 2021-10-05 PROCEDURE — 88341 IMHCHEM/IMCYTCHM EA ADD ANTB: CPT

## 2021-10-05 PROCEDURE — 31624 DX BRONCHOSCOPE/LAVAGE: CPT | Performed by: INTERNAL MEDICINE

## 2021-10-05 RX ORDER — OXYCODONE AND ACETAMINOPHEN 5; 325 MG/1; MG/1
1 TABLET ORAL AS NEEDED
Status: DISCONTINUED | OUTPATIENT
Start: 2021-10-05 | End: 2021-10-05 | Stop reason: HOSPADM

## 2021-10-05 RX ORDER — SUCCINYLCHOLINE CHLORIDE 100 MG/5ML
SYRINGE (ML) INTRAVENOUS AS NEEDED
Status: DISCONTINUED | OUTPATIENT
Start: 2021-10-05 | End: 2021-10-05 | Stop reason: HOSPADM

## 2021-10-05 RX ORDER — FENTANYL CITRATE 50 UG/ML
25 INJECTION, SOLUTION INTRAMUSCULAR; INTRAVENOUS AS NEEDED
Status: DISCONTINUED | OUTPATIENT
Start: 2021-10-05 | End: 2021-10-05 | Stop reason: HOSPADM

## 2021-10-05 RX ORDER — LIDOCAINE HYDROCHLORIDE 20 MG/ML
INJECTION, SOLUTION EPIDURAL; INFILTRATION; INTRACAUDAL; PERINEURAL AS NEEDED
Status: DISCONTINUED | OUTPATIENT
Start: 2021-10-05 | End: 2021-10-05 | Stop reason: HOSPADM

## 2021-10-05 RX ORDER — PROPOFOL 10 MG/ML
INJECTION, EMULSION INTRAVENOUS AS NEEDED
Status: DISCONTINUED | OUTPATIENT
Start: 2021-10-05 | End: 2021-10-05 | Stop reason: HOSPADM

## 2021-10-05 RX ORDER — SODIUM CHLORIDE, SODIUM LACTATE, POTASSIUM CHLORIDE, CALCIUM CHLORIDE 600; 310; 30; 20 MG/100ML; MG/100ML; MG/100ML; MG/100ML
25 INJECTION, SOLUTION INTRAVENOUS CONTINUOUS
Status: DISCONTINUED | OUTPATIENT
Start: 2021-10-05 | End: 2021-10-05 | Stop reason: HOSPADM

## 2021-10-05 RX ORDER — ONDANSETRON 2 MG/ML
INJECTION INTRAMUSCULAR; INTRAVENOUS AS NEEDED
Status: DISCONTINUED | OUTPATIENT
Start: 2021-10-05 | End: 2021-10-05 | Stop reason: HOSPADM

## 2021-10-05 RX ORDER — FENTANYL CITRATE 50 UG/ML
50 INJECTION, SOLUTION INTRAMUSCULAR; INTRAVENOUS
Status: DISCONTINUED | OUTPATIENT
Start: 2021-10-05 | End: 2021-10-05 | Stop reason: HOSPADM

## 2021-10-05 RX ORDER — PROPOFOL 10 MG/ML
INJECTION, EMULSION INTRAVENOUS
Status: DISCONTINUED | OUTPATIENT
Start: 2021-10-05 | End: 2021-10-05 | Stop reason: HOSPADM

## 2021-10-05 RX ORDER — DEXTROSE 50 % IN WATER (D50W) INTRAVENOUS SYRINGE
25-50 AS NEEDED
Status: DISCONTINUED | OUTPATIENT
Start: 2021-10-05 | End: 2021-10-05 | Stop reason: HOSPADM

## 2021-10-05 RX ORDER — MAGNESIUM SULFATE 100 %
4 CRYSTALS MISCELLANEOUS AS NEEDED
Status: DISCONTINUED | OUTPATIENT
Start: 2021-10-05 | End: 2021-10-05 | Stop reason: HOSPADM

## 2021-10-05 RX ORDER — MIDAZOLAM HYDROCHLORIDE 1 MG/ML
INJECTION, SOLUTION INTRAMUSCULAR; INTRAVENOUS AS NEEDED
Status: DISCONTINUED | OUTPATIENT
Start: 2021-10-05 | End: 2021-10-05 | Stop reason: HOSPADM

## 2021-10-05 RX ORDER — FENTANYL CITRATE 50 UG/ML
INJECTION, SOLUTION INTRAMUSCULAR; INTRAVENOUS AS NEEDED
Status: DISCONTINUED | OUTPATIENT
Start: 2021-10-05 | End: 2021-10-05 | Stop reason: HOSPADM

## 2021-10-05 RX ADMIN — SODIUM CHLORIDE 100 MCG: 9 INJECTION INTRAMUSCULAR; INTRAVENOUS; SUBCUTANEOUS at 13:17

## 2021-10-05 RX ADMIN — PROPOFOL 160 MG: 10 INJECTION, EMULSION INTRAVENOUS at 11:18

## 2021-10-05 RX ADMIN — ONDANSETRON 4 MG: 2 INJECTION INTRAMUSCULAR; INTRAVENOUS at 13:39

## 2021-10-05 RX ADMIN — SODIUM CHLORIDE 100 MCG: 9 INJECTION INTRAMUSCULAR; INTRAVENOUS; SUBCUTANEOUS at 12:05

## 2021-10-05 RX ADMIN — SODIUM CHLORIDE 100 MCG: 9 INJECTION INTRAMUSCULAR; INTRAVENOUS; SUBCUTANEOUS at 12:37

## 2021-10-05 RX ADMIN — MIDAZOLAM HYDROCHLORIDE 2 MG: 2 INJECTION, SOLUTION INTRAMUSCULAR; INTRAVENOUS at 11:18

## 2021-10-05 RX ADMIN — SODIUM CHLORIDE 100 MCG: 9 INJECTION INTRAMUSCULAR; INTRAVENOUS; SUBCUTANEOUS at 13:28

## 2021-10-05 RX ADMIN — SODIUM CHLORIDE, SODIUM LACTATE, POTASSIUM CHLORIDE, AND CALCIUM CHLORIDE 25 ML/HR: 600; 310; 30; 20 INJECTION, SOLUTION INTRAVENOUS at 10:00

## 2021-10-05 RX ADMIN — SODIUM CHLORIDE 100 MCG: 9 INJECTION INTRAMUSCULAR; INTRAVENOUS; SUBCUTANEOUS at 12:16

## 2021-10-05 RX ADMIN — SODIUM CHLORIDE 100 MCG: 9 INJECTION INTRAMUSCULAR; INTRAVENOUS; SUBCUTANEOUS at 12:29

## 2021-10-05 RX ADMIN — PROPOFOL 100 MCG/KG/MIN: 10 INJECTION, EMULSION INTRAVENOUS at 11:38

## 2021-10-05 RX ADMIN — FAMOTIDINE 20 MG: 10 INJECTION INTRAVENOUS at 10:28

## 2021-10-05 RX ADMIN — Medication 100 MG: at 11:18

## 2021-10-05 RX ADMIN — SODIUM CHLORIDE 100 MCG: 9 INJECTION INTRAMUSCULAR; INTRAVENOUS; SUBCUTANEOUS at 13:31

## 2021-10-05 RX ADMIN — FENTANYL CITRATE 100 MCG: 50 INJECTION, SOLUTION INTRAMUSCULAR; INTRAVENOUS at 11:18

## 2021-10-05 RX ADMIN — LIDOCAINE HYDROCHLORIDE 40 MG: 20 INJECTION, SOLUTION EPIDURAL; INFILTRATION; INTRACAUDAL; PERINEURAL at 11:18

## 2021-10-05 NOTE — ANESTHESIA POSTPROCEDURE EVALUATION
Procedure(s):  ENDOSCOPIC BRONCHOSCOPY ULTRASOUND (EBUS)/RADIAL PROBE, FNA  FLEXIBLE BRONCHOSCOPY, BAL, WASHINGS,.    general    Anesthesia Post Evaluation      Multimodal analgesia: multimodal analgesia used between 6 hours prior to anesthesia start to PACU discharge  Patient location during evaluation: bedside  Patient participation: complete - patient participated  Level of consciousness: awake  Pain management: adequate  Airway patency: patent  Anesthetic complications: no  Cardiovascular status: stable  Respiratory status: acceptable  Hydration status: acceptable  Post anesthesia nausea and vomiting:  controlled      INITIAL Post-op Vital signs:   Vitals Value Taken Time   /50 10/05/21 1504   Temp 36.1 °C (97 °F) 10/05/21 1350   Pulse 65 10/05/21 1504   Resp 26 10/05/21 1504   SpO2 94 % 10/05/21 1504

## 2021-10-05 NOTE — H&P
GENERAL GENERIC H&P/CONSULT    Subjective:  Patient is a 67y/o F with a  PMHx of COPD and Rt lung mass who presents today for Bronch/EBUS with biopsy. A PET/CT scan was performed which showed FDG avidity of the RUL mass and mediastinal adenopathy. She states she is a little anxious this morning. Nothing to eat after midnight. Not on any blood thinners. Past Medical History:   Diagnosis Date    Abnormal CXR     Arthritis     Bronchitis     Cancer (HCC)     RT lung    Chronic obstructive pulmonary disease ([address]     COVID-19     Menopause       Past Surgical History:   Procedure Laterality Date    HX HYSTERECTOMY  1987    TOSHA due to endometrosis, at age 28   [de-identified] OOPHORECTOMY        Prior to Admission medications    Medication Sig Start Date End Date Taking? Authorizing Provider   ibuprofen (MOTRIN) 200 mg tablet Take 800 mg by mouth as needed for Pain. Taken with food   Yes Provider, Historical   acetaminophen (Tylenol Extra Strength) 500 mg tablet Take 1,000 mg by mouth every six (6) hours as needed for Pain. Yes Provider, Historical   budesonide-formoteroL (Symbicort) 160-4.5 mcg/actuation HFAA Take 1 Puff by inhalation two (2) times a day. 9/22/21  Yes Hoang Devries, DO   triamcinolone acetonide (KENALOG) 0.1 % topical cream Apply  to affected area four (4) times daily as needed. 9/15/21  Yes Provider, Historical   cyclobenzaprine (FLEXERIL) 10 mg tablet Take 1 Tablet by mouth three (3) times daily as needed for Muscle Spasm(s). 9/20/21  Yes Heather Arango MD   albuterol (PROVENTIL HFA, VENTOLIN HFA, PROAIR HFA) 90 mcg/actuation inhaler Take 2 Puffs by inhalation every six (6) hours as needed for Wheezing. 9/20/21  Yes Heather Arango MD   methocarbamoL (ROBAXIN) 500 mg tablet Take 1 Tablet by mouth four (4) times daily. 8/26/21  Yes Heather Arango MD   nicotine (NICODERM CQ) 14 mg/24 hr patch 1 Patch by TransDERmal route every twenty-four (24) hours for 45 days.   Patient not taking: Reported on 10/1/2021 10/6/21 11/20/21  Hoang Devries DO   nicotine (NICODERM CQ) 7 mg/24 hr 1 Patch by TransDERmal route every twenty-four (24) hours for 14 days. Patient not taking: Reported on 10/1/2021 11/12/21 11/26/21  Hoang Devries DO     Allergies   Allergen Reactions    Iodine And Iodide Containing Products Hives      Social History     Tobacco Use    Smoking status: Current Every Day Smoker     Packs/day: 0.50     Years: 40.00     Pack years: 20.00    Smokeless tobacco: Never Used   Substance Use Topics    Alcohol use: Yes     Comment: seldom      Family History   Problem Relation Age of Onset    Heart Disease Mother     Diabetes Father     Cancer Father         Merkel cell in his hand to lymph nodes    COPD Brother     Emphysema Brother     Uterine Cancer Maternal Aunt     Colon Cancer Maternal Uncle     Cancer Paternal Aunt         brain    Cancer Paternal Uncle         Brain tumor      Review of Systems   Constitutional: Negative for chills and fever. Respiratory: Positive for cough. Negative for chest tightness, shortness of breath and wheezing. Cardiovascular: Negative for chest pain and palpitations. All other systems reviewed and are negative. Objective:    No intake/output data recorded. No intake/output data recorded. Patient Vitals for the past 8 hrs:   BP Temp Pulse Resp SpO2 Height Weight   10/05/21 0924 106/61 97.8 °F (36.6 °C) 78 18 97 % 5' 7\" (1.702 m) 91.6 kg (202 lb)     Physical Exam  Vitals reviewed. Constitutional:       General: She is not in acute distress. Cardiovascular:      Rate and Rhythm: Normal rate and regular rhythm. Pulses: Normal pulses. Heart sounds: Normal heart sounds. Pulmonary:      Effort: Pulmonary effort is normal. No respiratory distress. Breath sounds: No wheezing, rhonchi or rales. Neurological:      Mental Status: She is alert.           Labs:  No results found for this or any previous visit (from the past 24 hour(s)). Assessment:  #RUL mass      Plan:  Plan for Flexible Brochoscopy/EBUS with biopsy today.     Signed:  Hoang Devries DO   10/05/21  Pulmonary, Critical Care Medicine  [facility]

## 2021-10-05 NOTE — ANESTHESIA PREPROCEDURE EVALUATION
Relevant Problems   No relevant active problems       Anesthetic History   No history of anesthetic complications            Review of Systems / Medical History  Patient summary reviewed and pertinent labs reviewed    Pulmonary    COPD: mild               Neuro/Psych   Within defined limits           Cardiovascular                  Exercise tolerance: >4 METS     GI/Hepatic/Renal                Endo/Other        Arthritis and cancer     Other Findings              Physical Exam    Airway  Mallampati: III  TM Distance: 4 - 6 cm  Neck ROM: decreased range of motion   Mouth opening: Normal     Cardiovascular    Rhythm: regular  Rate: normal         Dental    Dentition: Full lower dentures and Full upper dentures     Pulmonary  Breath sounds clear to auscultation               Abdominal  GI exam deferred       Other Findings            Anesthetic Plan    ASA: 3  Anesthesia type: general          Induction: Intravenous  Anesthetic plan and risks discussed with: Patient

## 2021-10-05 NOTE — PROCEDURES
BRONCHOSCOPY    Patient Name: Taco Rapp   Patient MRN: [id number]  Patient : 1955     PROCEDURE DATE:  10/5/2021    ENDOSCOPIST:  Dr. Atilio Langford    ASSISTANT:     Dr. Tatiana Bullock:      RUL mass    ANESTHESIA:  General anesthesia with endotracheal intubation. Please see anesthesia records for full details. OPERATIVE FINDINGS:    1. RUL apical segment bronchial narrowing  2. Thick, white mucus throughout tracheobronchial tree  3. Near complete occlusion of LLL bronchi by thick, white mucus      PROCEDURE PERFORMED:    - Video bronchoscopy with airway inspection   - Bronchoscopy with photodocumentation  - Bronchoalveolar lavage of lingula and RUL apical segment  - Mediastinal lymph node survey with endobronchial linear ultra-sound  - Fine needle aspiration of station 4L and 7 lymph node stations  - Fine needle aspiration of RUL mass      DESCRIPTION:  After informed consent was obtained of the risks, benefits, and alternatives of this procedure were explained to the patient in great detail a consent was obtained and placed in the chart. The procedure was performed in the endoscopy suite on an outpatient basis. Prior to the start of the procedure a time out was called and the correct patient and procedure were identified. The patient was monitored throughout the procedure in the usual fashion and administered supplemental O2 at 100%. A total of 220 cc of saline used for washings and BAL in 20 cc and 50 cc aliquots. After the patient was appropriately sedated and intubated per the anesthesia department, the bronchoscope was passed through the endotracheal tube with immediate visualization of the trachea. Detailed airway inspection was subsequently performed in the usual fashion of the  [address], RMB, RUL, BI, RML, RLL, LMB, JUAQUIN, and LLL. The  [address] was sharp and unremarkable. Attention was first turned the right lung. The RMB was unremarkable. The RUl apical segment bronchi was noted to be narrowed. The BI, RML and RLL were largerly unremarkable. Attention was then turned to the left lung. The LMB, JUAQUIN and LLL anatomy was unremarkable. The LLL bronchus was nearly completely obstructed by inspissated mucus. LLL bronchial washing was performed. Moderate amounts of thick, white mucus seen throughout tracheobronchial tree. No active bleeding, endobronchial lesions, or granulation tissue was identified. BAL of the JUAQUIN/lingula and RUL apical segment were performed. After satisfactory inspection with no active bleeding, the standard bronchoscope was removed from the endotracheal tube. The standard bronchoscope was then exchanged for the EBUS Scope and the LN's in question were defined by anatomy. Transbronchial needle aspiration was performed of lymph nodes at stations 4L (5 passes) and 7 (5 passes) and along with RUL mass (8 passes). Stating 4R lymph node was not visualized on EBUS likely secondary to probable involvement/engulfement by the RUL mass. Sampling was performed as indicated above using a 22 gauge needle. On-site cytology was present. Once adequate specimen had been obtained, the bronchoscope was removed and the patient transferred to recovery room in stable condition as directed by the anesthesia department. Specimen:    1. Bronchoalveolar lavage of the JUAQUIN and RUL    2. Bronchial washings of the LLL  3. Transbronchial needle aspiration of lymph nodes 4L and 7  4. Transbronchial needle aspiration of RUL mass    ____________________________________    Patient is to follow up in the office at the pre-determined appointment.     Chest xray post procedure without pneumothorax.   ______________________________________________________

## 2021-10-05 NOTE — DISCHARGE INSTRUCTIONS
Patient Education        Bronchoscopy: What to Expect at Home  Your Recovery     Bronchoscopy lets your doctor look at your airway through a tube called a bronchoscope. Afterward, you may feel tired for 1 or 2 days. Your mouth may feel very dry for several hours after the procedure. You may also have a sore throat and a hoarse voice for a few days. Sucking on throat lozenges or gargling with warm salt water may help soothe your sore throat. If a sample of tissue (biopsy) was taken, you may spit up a small amount of blood or have bloody saliva. This is normal.  Do not drive for at least 8 hours after the procedure. Do not smoke for at least 24 hours. This care sheet gives you a general idea about how long it will take for you to recover. But each person recovers at a different pace. Follow the steps below to get better as quickly as possible. How can you care for yourself at home? Activity    · Do not eat anything for 2 hours after the procedure.     · Rest when you feel tired. Getting enough sleep will help you recover.     · Avoid strenuous activities, such as bicycle riding, jogging, weight lifting, or aerobic exercise, until your doctor says it is okay.     · Ask your doctor when you can drive again. Diet    · You can eat your normal diet. If your stomach is upset, try bland, low-fat foods like plain rice, broiled chicken, toast, and yogurt.     · If it is painful to swallow, start out with cold drinks, flavored ice pops, and ice cream. Next, try soft foods like pudding, yogurt, canned or cooked fruit, scrambled eggs, and mashed potatoes. Avoid eating hard or scratchy foods like chips or raw vegetables. Avoid orange or tomato juice and other acidic foods that can sting the throat.     · Drink plenty of fluids to avoid becoming dehydrated (unless your doctor tells you not to). Medicines    · Take pain medicines exactly as directed.   ? If the doctor gave you a prescription medicine for pain, take it as prescribed. ? If you are not taking a prescription pain medicine, ask your doctor if you can take an over-the-counter medicine.     · If you think your pain medicine is making you sick to your stomach:  ? Take your medicine after meals (unless your doctor has told you not to). ? Ask your doctor for a different pain medicine.     · If your doctor prescribed antibiotics, take them as directed. Do not stop taking them just because you feel better. You need to take the full course of antibiotics. Follow-up care is a key part of your treatment and safety. Be sure to make and go to all appointments, and call your doctor if you are having problems. It's also a good idea to know your test results and keep a list of the medicines you take. When should you call for help? Call 911 anytime you think you may need emergency care. For example, call if:    · You passed out (lost consciousness).     · You have sudden chest pain and shortness of breath.     · You cough up large amounts of bright red blood.     · You have severe pain in your chest.     · You have severe trouble breathing. Call your doctor now or seek immediate medical care if:    · You cough up more than a few tablespoons of blood.     · You have pain that does not get better after you take pain medicine.     · You have a fever over 100°F.     · You still sound hoarse after a few days.     · You have bubbles under the skin around the collarbone. These may crackle and pop when you press on them. Watch closely for changes in your health, and be sure to contact your doctor if you have any problems. Where can you learn more? Go to http://www.gray.com/  Enter W048 in the search box to learn more about \"Bronchoscopy: What to Expect at Home. \"  Current as of: July 6, 2021               Content Version: 13.0  © 5270-0667 Healthwise, Inc[employer].    Care instructions adapted under license by [employer] (which disclaims liability or warranty for this information). If you have questions about a medical condition or this instruction, always ask your healthcare professional. [place] [address] any warranty or liability for your use of this information. DISCHARGE SUMMARY from Nurse    PATIENT INSTRUCTIONS:    After general anesthesia or intravenous sedation, for 24 hours or while taking prescription Narcotics:  · Limit your activities  · Do not drive and operate hazardous machinery  · Do not make important personal or business decisions  · Do  not drink alcoholic beverages  · If you have not urinated within 8 hours after discharge, please contact your surgeon on call.     Report the following to your surgeon:  · Excessive pain, swelling, redness or odor of or around the surgical area  · Temperature over 100.5  · Nausea and vomiting lasting longer than 4 hours or if unable to take medications  · Any signs of decreased circulation or nerve impairment to extremity: change in color, persistent  numbness, tingling, coldness or increase pain  · Any questions

## 2021-10-06 ENCOUNTER — HOSPITAL ENCOUNTER (OUTPATIENT)
Dept: LAB | Age: 66
Discharge: HOME | End: 2021-10-06
Payer: MEDICARE

## 2021-10-06 LAB — SARS-COV-2, COV2: NORMAL

## 2021-10-06 PROCEDURE — U0003 INFECTIOUS AGENT DETECTION BY NUCLEIC ACID (DNA OR RNA); SEVERE ACUTE RESPIRATORY SYNDROME CORONAVIRUS 2 (SARS-COV-2) (CORONAVIRUS DISEASE [COVID-19]), AMPLIFIED PROBE TECHNIQUE, MAKING USE OF HIGH THROUGHPUT TECHNOLOGIES AS DESCRIBED BY CMS-2020-01-R: HCPCS

## 2021-10-07 ENCOUNTER — TELEPHONE (OUTPATIENT)
Dept: PULMONOLOGY | Age: 66
End: 2021-10-07

## 2021-10-07 DIAGNOSIS — C34.11 MALIGNANT NEOPLASM OF UPPER LOBE OF RIGHT LUNG (HCC): Primary | ICD-10-CM

## 2021-10-07 LAB
BACTERIA SPEC CULT: NORMAL
GRAM STN SPEC: NORMAL
SARS-COV-2, NAA: NOT DETECTED
SERVICE CMNT-IMP: NORMAL

## 2021-10-07 NOTE — TELEPHONE ENCOUNTER
I called and discussed bronchoscopy results with patient - Squamous cell lung cancer. Referral placed for Oncology (Dr. Serge Taylor) and RadOnc (Dr. Dallas Armendariz). Discussed case with Dr. eSrge Taylor - her office to call patient in [place] to schedule a visit. Patient demonstrated understanding and thanked me for calling.     Hoang Devries DO  10/7/2021  4:30 PM

## 2021-10-11 ENCOUNTER — OFFICE VISIT (OUTPATIENT)
Dept: PULMONOLOGY | Age: 66
End: 2021-10-11
Payer: MEDICARE

## 2021-10-11 VITALS
WEIGHT: 201.7 LBS | OXYGEN SATURATION: 97 % | BODY MASS INDEX: 31.66 KG/M2 | SYSTOLIC BLOOD PRESSURE: 104 MMHG | HEART RATE: 64 BPM | DIASTOLIC BLOOD PRESSURE: 60 MMHG | HEIGHT: 67 IN

## 2021-10-11 DIAGNOSIS — R06.09 DYSPNEA ON EXERTION: Primary | ICD-10-CM

## 2021-10-11 DIAGNOSIS — R06.02 SOB (SHORTNESS OF BREATH): ICD-10-CM

## 2021-10-11 PROCEDURE — 94618 PULMONARY STRESS TESTING: CPT | Performed by: INTERNAL MEDICINE

## 2021-10-11 PROCEDURE — 94060 EVALUATION OF WHEEZING: CPT | Performed by: INTERNAL MEDICINE

## 2021-10-11 PROCEDURE — 94727 GAS DIL/WSHOT DETER LNG VOL: CPT | Performed by: INTERNAL MEDICINE

## 2021-10-11 PROCEDURE — 94729 DIFFUSING CAPACITY: CPT | Performed by: INTERNAL MEDICINE

## 2021-10-14 ENCOUNTER — TRANSCRIBE ORDER (OUTPATIENT)
Dept: SCHEDULING | Age: 66
End: 2021-10-14

## 2021-10-14 DIAGNOSIS — C34.11 MALIGNANT NEOPLASM OF UPPER LOBE OF RIGHT LUNG (HCC): Primary | ICD-10-CM

## 2021-10-14 RX ORDER — IBUPROFEN 200 MG
1 TABLET ORAL EVERY 24 HOURS
Qty: 30 PATCH | Refills: 0 | Status: SHIPPED | OUTPATIENT
Start: 2021-10-14 | End: 2021-11-13

## 2021-10-14 NOTE — TELEPHONE ENCOUNTER
Pt calling today to speak with nurse bowen wilson. She stated that Dr. Isabela Taveras started her on the second step instead of the first. She stated that it is not helping and would like to know if she can start on the first step. Pt uses [facility] in [place].  Please advise [phone]

## 2021-10-18 ENCOUNTER — HOSPITAL ENCOUNTER (OUTPATIENT)
Dept: MRI IMAGING | Age: 66
Discharge: HOME | End: 2021-10-18
Payer: MEDICARE

## 2021-10-18 DIAGNOSIS — C34.11 MALIGNANT NEOPLASM OF UPPER LOBE OF RIGHT LUNG (HCC): ICD-10-CM

## 2021-10-18 PROCEDURE — 74011250636 HC RX REV CODE- 250/636: Performed by: INTERNAL MEDICINE

## 2021-10-18 PROCEDURE — 70553 MRI BRAIN STEM W/O & W/DYE: CPT

## 2021-10-18 PROCEDURE — A9576 INJ PROHANCE MULTIPACK: HCPCS | Performed by: INTERNAL MEDICINE

## 2021-10-18 RX ADMIN — GADOTERIDOL 20 ML: 279.3 INJECTION, SOLUTION INTRAVENOUS at 08:33

## 2021-11-08 LAB
BACTERIA SPEC CULT: NORMAL
SERVICE CMNT-IMP: NORMAL

## 2021-11-16 ENCOUNTER — OFFICE VISIT (OUTPATIENT)
Dept: INTERNAL MEDICINE CLINIC | Age: 66
End: 2021-11-16
Payer: MEDICARE

## 2021-11-16 VITALS
DIASTOLIC BLOOD PRESSURE: 84 MMHG | HEIGHT: 67 IN | OXYGEN SATURATION: 94 % | BODY MASS INDEX: 31.04 KG/M2 | HEART RATE: 95 BPM | RESPIRATION RATE: 20 BRPM | SYSTOLIC BLOOD PRESSURE: 139 MMHG | TEMPERATURE: 97.3 F | WEIGHT: 197.8 LBS

## 2021-11-16 DIAGNOSIS — C34.11 MALIGNANT NEOPLASM OF UPPER LOBE OF RIGHT LUNG (HCC): Primary | ICD-10-CM

## 2021-11-16 PROCEDURE — 1101F PT FALLS ASSESS-DOCD LE1/YR: CPT | Performed by: INTERNAL MEDICINE

## 2021-11-16 PROCEDURE — G8427 DOCREV CUR MEDS BY ELIG CLIN: HCPCS | Performed by: INTERNAL MEDICINE

## 2021-11-16 PROCEDURE — G8417 CALC BMI ABV UP PARAM F/U: HCPCS | Performed by: INTERNAL MEDICINE

## 2021-11-16 PROCEDURE — G8400 PT W/DXA NO RESULTS DOC: HCPCS | Performed by: INTERNAL MEDICINE

## 2021-11-16 PROCEDURE — 99212 OFFICE O/P EST SF 10 MIN: CPT | Performed by: INTERNAL MEDICINE

## 2021-11-16 PROCEDURE — G8510 SCR DEP NEG, NO PLAN REQD: HCPCS | Performed by: INTERNAL MEDICINE

## 2021-11-16 PROCEDURE — G9899 SCRN MAM PERF RSLTS DOC: HCPCS | Performed by: INTERNAL MEDICINE

## 2021-11-16 PROCEDURE — 1090F PRES/ABSN URINE INCON ASSESS: CPT | Performed by: INTERNAL MEDICINE

## 2021-11-16 PROCEDURE — 3017F COLORECTAL CA SCREEN DOC REV: CPT | Performed by: INTERNAL MEDICINE

## 2021-11-16 PROCEDURE — G8536 NO DOC ELDER MAL SCRN: HCPCS | Performed by: INTERNAL MEDICINE

## 2021-11-16 RX ORDER — ONDANSETRON HYDROCHLORIDE 8 MG/1
8 TABLET, FILM COATED ORAL
COMMUNITY
End: 2022-10-31

## 2021-11-16 NOTE — PROGRESS NOTES
Had 3rd chemo treatment today. States Nicoderm  is not working. Albino Friend presents today for   Chief Complaint   Patient presents with    Follow-up     6 weeks       Is someone accompanying this pt? no  Is the patient using any DME equipment during OV? no    Depression Screening:  3 most recent PHQ Screens 11/16/2021   Little interest or pleasure in doing things Not at all   Feeling down, depressed, irritable, or hopeless Not at all   Total Score PHQ 2 0       Learning Assessment:  Learning Assessment 9/10/2021   PRIMARY LEARNER Patient   PRIMARY LANGUAGE ENGLISH   LEARNER PREFERENCE PRIMARY DEMONSTRATION   ANSWERED BY Patient   RELATIONSHIP SELF       Fall Risk  Fall Risk Assessment, last 12 mths 11/16/2021   Able to walk? Yes   Fall in past 12 months? 0   Do you feel unsteady? 0   Are you worried about falling 0       ADL  ADL Assessment 11/16/2021   Feeding yourself No Help Needed   Getting from bed to chair No Help Needed   Getting dressed No Help Needed   Bathing or showering No Help Needed   Walk across the room (includes cane/walker) No Help Needed   Using the telphone No Help Needed   Taking your medications No Help Needed   Preparing meals No Help Needed   Managing money (expenses/bills) No Help Needed   Moderately strenuous housework (laundry) No Help Needed   Shopping for personal items (toiletries/medicines) No Help Needed   Shopping for groceries No Help Needed   Driving No Help Needed   Climbing a flight of stairs No Help Needed   Getting to places beyond walking distances No Help Needed       Health Maintenance reviewed and discussed and ordered per Provider.     Health Maintenance Due   Topic Date Due    Hepatitis C Screening  Never done    DTaP/Tdap/Td series (1 - Tdap) Never done    Lipid Screen  Never done    Colorectal Cancer Screening Combo  Never done    Shingrix Vaccine Age 50> (1 of 2) Never done    Low dose CT lung screening  Never done    Flu Vaccine (1) 09/01/2021  Bone Densitometry (Dexa) Screening  Never done    Pneumococcal 65+ years (1 of 1 - PPSV23) Never done   . Coordination of Care:  1. \"Have you been to the ER, urgent care clinic since your last visit? Hospitalized since your last visit? \" No    2. \"Have you seen or consulted any other health care providers outside of the [address] [place] [address] since your last visit? \" Yes Where: [facility]

## 2021-11-16 NOTE — PROGRESS NOTES
1. Malignant neoplasm of upper lobe of right lung ([facility] [address]  . He is in the process of getting chemotherapy and radiation. She has been followed by an excellent group of clinicians. This encounter was just discussed her current condition      Chief Complaint   Patient presents with    Follow-up     6 weeks        HPI   This is a delightful 59-year-old female who was recently diagnosed with lung cancer. She is now undergoing chemotherapy and radiation. She reports she has no symptoms although she does report she is a little more tired than usual. She denies any chest pain nausea vomiting or diarrhea. There is no problem list on file for this patient. Current Outpatient Medications on File Prior to Visit   Medication Sig Dispense Refill    ondansetron hcl (ZOFRAN) 8 mg tablet Take 8 mg by mouth every eight (8) hours as needed for Nausea or Nausea or Vomiting.  ibuprofen (MOTRIN) 200 mg tablet Take 800 mg by mouth as needed for Pain. Taken with food      acetaminophen (Tylenol Extra Strength) 500 mg tablet Take 1,000 mg by mouth every six (6) hours as needed for Pain.  nicotine (NICODERM CQ) 14 mg/24 hr patch 1 Patch by TransDERmal route every twenty-four (24) hours for 45 days. 45 Patch 0    nicotine (NICODERM CQ) 7 mg/24 hr 1 Patch by TransDERmal route every twenty-four (24) hours for 14 days. 14 Patch 0    budesonide-formoteroL (Symbicort) 160-4.5 mcg/actuation HFAA Take 1 Puff by inhalation two (2) times a day. 1 Each 2    triamcinolone acetonide (KENALOG) 0.1 % topical cream Apply  to affected area four (4) times daily as needed.  cyclobenzaprine (FLEXERIL) 10 mg tablet Take 1 Tablet by mouth three (3) times daily as needed for Muscle Spasm(s). 60 Tablet 1    albuterol (PROVENTIL HFA, VENTOLIN HFA, PROAIR HFA) 90 mcg/actuation inhaler Take 2 Puffs by inhalation every six (6) hours as needed for Wheezing.  1 Each 5    methocarbamoL (ROBAXIN) 500 mg tablet Take 1 Tablet by mouth four (4) times daily. 30 Tablet 2     No current facility-administered medications on file prior to visit. ROS  - GEN: no weight gain/loss, no fevers or chills + fatigue  - HEENT: no vision changes, no tinnitus, no sore throat  -  - RESP: no sob, cough  - ABD: no n/v/d, no blood in stool  -    Visit Vitals  /84   Pulse 95   Temp 97.3 °F (36.3 °C)   Resp 20   Ht 5' 7\" (1.702 m)   Wt 197 lb 12.8 oz (89.7 kg)   SpO2 94%   BMI 30.98 kg/m²           Physical Exam  Constitutional:       Appearance: Normal appearance. Dorinda Lorraine NAD and pleasant  HENT:      Head: Normocephalic. Nose: Nose normal.      Mouth/Throat:      Mouth: Mucous membranes are moist.    Cardiovascular:      Rate and Rhythm: Normal rate and regular rhythm. Pulses: Normal pulses. Port in place on left  Pulmonary:      Effort: No respiratory distress. Breath sounds: CTAB and No stridor. No rhonchi.      Psychiatry     Calm, normal affect, interacting normally

## 2021-12-22 ENCOUNTER — TRANSCRIBE ORDER (OUTPATIENT)
Dept: SCHEDULING | Age: 66
End: 2021-12-22

## 2021-12-22 DIAGNOSIS — C34.11 MALIGNANT NEOPLASM OF UPPER LOBE, RIGHT BRONCHUS OR LUNG (HCC): Primary | ICD-10-CM

## 2021-12-31 ENCOUNTER — HOSPITAL ENCOUNTER (OUTPATIENT)
Dept: CT IMAGING | Age: 66
Discharge: HOME | End: 2021-12-31
Payer: MEDICARE

## 2021-12-31 DIAGNOSIS — C34.11 MALIGNANT NEOPLASM OF UPPER LOBE, RIGHT BRONCHUS OR LUNG (HCC): ICD-10-CM

## 2021-12-31 PROCEDURE — 74011000636 HC RX REV CODE- 636

## 2021-12-31 PROCEDURE — 74177 CT ABD & PELVIS W/CONTRAST: CPT

## 2021-12-31 RX ORDER — SODIUM CHLORIDE 0.9 % (FLUSH) 0.9 %
5-10 SYRINGE (ML) INJECTION
Status: COMPLETED | OUTPATIENT
Start: 2021-12-31 | End: 2021-12-31

## 2021-12-31 RX ADMIN — IOPAMIDOL 95 ML: 755 INJECTION, SOLUTION INTRAVENOUS at 10:24

## 2021-12-31 RX ADMIN — Medication 10 ML: at 10:16

## 2022-01-12 DIAGNOSIS — R06.09 DYSPNEA ON EXERTION: ICD-10-CM

## 2022-01-12 DIAGNOSIS — Z01.812 ENCOUNTER FOR PRE-OPERATIVE LABORATORY TESTING: ICD-10-CM

## 2022-01-12 DIAGNOSIS — R93.89 ABNORMAL CT SCAN, CHEST: ICD-10-CM

## 2022-01-12 DIAGNOSIS — R91.8 MASS OF RIGHT LUNG: ICD-10-CM

## 2022-01-12 DIAGNOSIS — R06.02 SOB (SHORTNESS OF BREATH): ICD-10-CM

## 2022-02-22 ENCOUNTER — TRANSCRIBE ORDER (OUTPATIENT)
Dept: SCHEDULING | Age: 67
End: 2022-02-22

## 2022-02-22 DIAGNOSIS — C34.11 MALIGNANT NEOPLASM OF UPPER LOBE OF RIGHT LUNG (HCC): Primary | ICD-10-CM

## 2022-03-16 ENCOUNTER — HOSPITAL ENCOUNTER (OUTPATIENT)
Dept: CT IMAGING | Age: 67
Discharge: HOME | End: 2022-03-16
Payer: MEDICARE

## 2022-03-16 DIAGNOSIS — C34.11 MALIGNANT NEOPLASM OF UPPER LOBE OF RIGHT LUNG (HCC): ICD-10-CM

## 2022-03-16 PROCEDURE — 71260 CT THORAX DX C+: CPT

## 2022-03-16 PROCEDURE — 74011000636 HC RX REV CODE- 636

## 2022-03-16 PROCEDURE — 74011000250 HC RX REV CODE- 250

## 2022-03-16 RX ORDER — SODIUM CHLORIDE 0.9 % (FLUSH) 0.9 %
5-10 SYRINGE (ML) INJECTION
Status: COMPLETED | OUTPATIENT
Start: 2022-03-16 | End: 2022-03-16

## 2022-03-16 RX ADMIN — Medication 10 ML: at 15:19

## 2022-03-16 RX ADMIN — IOPAMIDOL 92 ML: 755 INJECTION, SOLUTION INTRAVENOUS at 15:22

## 2022-06-07 ENCOUNTER — TRANSCRIBE ORDER (OUTPATIENT)
Dept: SCHEDULING | Age: 67
End: 2022-06-07

## 2022-06-07 DIAGNOSIS — R92.8 OTHER ABNORMAL AND INCONCLUSIVE FINDINGS ON DIAGNOSTIC IMAGING OF BREAST: Primary | ICD-10-CM

## 2022-06-14 ENCOUNTER — TRANSCRIBE ORDER (OUTPATIENT)
Dept: REGISTRATION | Age: 67
End: 2022-06-14

## 2022-06-14 ENCOUNTER — HOSPITAL ENCOUNTER (OUTPATIENT)
Dept: MAMMOGRAPHY | Age: 67
Discharge: HOME | End: 2022-06-14
Payer: MEDICARE

## 2022-06-14 ENCOUNTER — HOSPITAL ENCOUNTER (OUTPATIENT)
Dept: ULTRASOUND IMAGING | Age: 67
Discharge: HOME | End: 2022-06-14
Payer: MEDICARE

## 2022-06-14 DIAGNOSIS — R92.8 OTHER ABNORMAL AND INCONCLUSIVE FINDINGS ON DIAGNOSTIC IMAGING OF BREAST: ICD-10-CM

## 2022-06-14 DIAGNOSIS — R92.8 OTHER ABNORMAL AND INCONCLUSIVE FINDINGS ON DIAGNOSTIC IMAGING OF BREAST: Primary | ICD-10-CM

## 2022-06-14 PROCEDURE — 76642 ULTRASOUND BREAST LIMITED: CPT

## 2022-06-14 PROCEDURE — 77061 BREAST TOMOSYNTHESIS UNI: CPT

## 2022-06-28 LAB — CREATININE, EXTERNAL: 0.5

## 2022-09-14 ENCOUNTER — OFFICE VISIT (OUTPATIENT)
Dept: FAMILY MEDICINE CLINIC | Age: 67
End: 2022-09-14
Payer: MEDICARE

## 2022-09-14 ENCOUNTER — HOSPITAL ENCOUNTER (OUTPATIENT)
Dept: GENERAL RADIOLOGY | Age: 67
Discharge: HOME | End: 2022-09-14
Payer: MEDICARE

## 2022-09-14 ENCOUNTER — HOSPITAL ENCOUNTER (OUTPATIENT)
Dept: LAB | Age: 67
Discharge: HOME | End: 2022-09-14

## 2022-09-14 VITALS
SYSTOLIC BLOOD PRESSURE: 110 MMHG | OXYGEN SATURATION: 95 % | HEART RATE: 119 BPM | BODY MASS INDEX: 26.55 KG/M2 | DIASTOLIC BLOOD PRESSURE: 80 MMHG | RESPIRATION RATE: 18 BRPM | HEIGHT: 66 IN | WEIGHT: 165.2 LBS

## 2022-09-14 DIAGNOSIS — J42 CHRONIC BRONCHITIS, UNSPECIFIED CHRONIC BRONCHITIS TYPE (HCC): ICD-10-CM

## 2022-09-14 DIAGNOSIS — R53.1 WEAKNESS GENERALIZED: ICD-10-CM

## 2022-09-14 DIAGNOSIS — R92.8 ABNORMAL MAMMOGRAM: ICD-10-CM

## 2022-09-14 DIAGNOSIS — R60.9 SWELLING: Primary | ICD-10-CM

## 2022-09-14 DIAGNOSIS — C34.11 MALIGNANT NEOPLASM OF UPPER LOBE OF RIGHT LUNG (HCC): ICD-10-CM

## 2022-09-14 DIAGNOSIS — R06.02 SHORTNESS OF BREATH: ICD-10-CM

## 2022-09-14 PROBLEM — J44.9 CHRONIC OBSTRUCTIVE PULMONARY DISEASE (HCC): Status: ACTIVE | Noted: 2022-09-14

## 2022-09-14 PROBLEM — C80.1 CANCER (HCC): Status: ACTIVE | Noted: 2022-09-14

## 2022-09-14 PROBLEM — C34.91 NON-SMALL CELL LUNG CANCER, RIGHT (HCC): Status: ACTIVE | Noted: 2022-09-14

## 2022-09-14 PROBLEM — K21.9 ACID REFLUX: Status: ACTIVE | Noted: 2022-01-01

## 2022-09-14 PROBLEM — F17.210 DEPENDENCE ON NICOTINE FROM CIGARETTES: Status: ACTIVE | Noted: 2022-09-14

## 2022-09-14 PROCEDURE — 99001 SPECIMEN HANDLING PT-LAB: CPT

## 2022-09-14 PROCEDURE — 99214 OFFICE O/P EST MOD 30 MIN: CPT | Performed by: STUDENT IN AN ORGANIZED HEALTH CARE EDUCATION/TRAINING PROGRAM

## 2022-09-14 PROCEDURE — 71046 X-RAY EXAM CHEST 2 VIEWS: CPT

## 2022-09-14 PROCEDURE — 1123F ACP DISCUSS/DSCN MKR DOCD: CPT | Performed by: STUDENT IN AN ORGANIZED HEALTH CARE EDUCATION/TRAINING PROGRAM

## 2022-09-14 RX ORDER — BENZONATATE 100 MG/1
1 CAPSULE ORAL
COMMUNITY
Start: 2022-05-23 | End: 2022-10-28 | Stop reason: SDUPTHER

## 2022-09-14 RX ORDER — FAMOTIDINE 20 MG/1
TABLET, FILM COATED ORAL
COMMUNITY
Start: 2022-06-20

## 2022-09-14 NOTE — PROGRESS NOTES
Corby Li (: 1955) is a 78 y.o. female here for evaluation of the following chief concerns(s):  Establish Care (70 y/o F present to clinic to [facility]. [facility]. )       ASSESSMENT/PLAN:    1. Swelling  - XR CHEST BI W DECUB; Future  - ECHO ADULT COMPLETE; Future  2. Malignant neoplasm of upper lobe of right lung ([address]- Stable, following with heme onc and undergoing treatment. Agree with current care plan  3. Chronic bronchitis, unspecified chronic bronchitis type ([address]- stable, no recent exacerbation and I do not suspect that current SOB is from COPD exacerbation. Continue Symbicort and Albuterol as needed  4. Shortness of breath  - METABOLIC PANEL, COMPREHENSIVE  - CBC WITH AUTOMATED DIFF  - NT-PRO BNP  - XR CHEST BI W DECUB; Future  - ECHO ADULT COMPLETE; Future  5. Weakness generalized  6. Abnormal mammogram  - [facility] MAMMO RT DX INCL CAD; Future    Patient actually does not have a lot of swelling on my exam today, however she does still have some significant swelling in the right breast.  I am concerned given her symptoms that she may have some fluid in the lungs, however I do not hear any crackles in the lower lung fields to support on my exam.  The shortness of breath is not new, has been going on for several months. She is tachycardic today at about 119, regular-I think that she may be dry from the Lasix, she also has poor p.o. intake secondary to cancer treatment. I have lower suspicion for PE, given the patient is not hypoxic, shortness of breath is not new, no signs of DVT in the legs. I discussed with the patient holding the Lasix for right now. She can monitor her weight and take as needed if the weight goes up by more than 2 pounds in a day. I would only take 20 mg at a time. I am going to recheck labs given her significant fatigue, electrolytes may be off given that she was naïve to Lasix. I have ordered a chest x-ray that she will have done today.   I have also ordered an echo to be completed as well. Labs per above. She has follow-up with her oncologist next month. ER precautions. I will see her back next week. In addition, she had an history of abnormal mammogram with follow-up ultrasound in June 2022-they suggested that changes were likely from her radiation therapy, and that they were likely benign, however they recommend follow-up mammogram in 3 months which I have ordered today. Return in about 1 week (around 9/21/2022). Loida Cowan agrees with plan as above and has no additional questions at this time. SUBJECTIVE/OBJECTIVE:    Patient presents for swelling and shortness of breath. Patient has a history of right-sided non-small cell lung cancer, s/p chemo and radiation treatments-currently on weekly infusion therapy. Patient reports that last week she started having some significant swelling, mostly on the right side of her body and especially in the right breast and the right side of her face. She also endorsed some bilateral lower extremity edema. Patient states she called her oncologist and was told to go to the ER. She presented to [facility] ER, however after waiting almost 4 hours she left without being seen. She had been given some Lasix pills prior to this, and she decided to start taking them again. The 40 milligram pills that she had been given prior to this made her too weak and dizzy, so she has been taking just half a pill. She states that her weight has gone down by 4 pounds. She still endorses significant amount of swelling in the right breast, however every will also the swelling seems to have gone down. Patient endorses a history of shortness of breath for many months now. States that she has a chronic cough and coughs up yellow/clear mucus, this is not new. She has shortness of breath on exertion. She has had to sleep sitting up in her recliner because she is so short of breath when she lays flat to sleep.   She has never had an echo, no history of any heart issues. She does have COPD-she denies any changes in her cough/mucus production. No fevers or chills. Patient tells me that she had to have a vascular procedure done on her subclavian vein several months ago, apparently the lung tumor had invaded into this vein. She was not told to follow-up with the vascular surgeon. Past Medical History:   Diagnosis Date    Abnormal CXR     Arthritis     Bronchitis     Cancer [address]     RT lung    Chronic obstructive pulmonary disease ([facility]     COVID-19     Menopause      Past Surgical History:   Procedure Laterality Date    HX HYSTERECTOMY  1987    TOSHA due to endometrosis, at age 28    HX OOPHORECTOMY       Family History   Problem Relation Age of Onset    Heart Disease Mother     Diabetes Father     Cancer Father         Merkel cell in his hand to lymph nodes    COPD Brother     Emphysema Brother     Uterine Cancer Maternal Aunt     Colon Cancer Maternal Uncle     Cancer Paternal Aunt         brain    Cancer Paternal Uncle         Brain tumor       Social History     Socioeconomic History    Marital status:    Tobacco Use    Smoking status: Every Day     Packs/day: 1.00     Years: 40.00     Pack years: 40.00     Types: Cigarettes    Smokeless tobacco: Never   Vaping Use    Vaping Use: Never used   Substance and Sexual Activity    Alcohol use: Yes     Comment: seldom    Drug use: Not Currently     Types: Marijuana     Social History     Tobacco Use   Smoking Status Every Day    Packs/day: 1.00    Years: 40.00    Pack years: 40.00    Types: Cigarettes   Smokeless Tobacco Never       Current Outpatient Medications   Medication Sig Dispense Refill    famotidine (PEPCID) 20 mg tablet       benzonatate (TESSALON) 100 mg capsule Take 1 Capsule by mouth. ondansetron hcl (ZOFRAN) 8 mg tablet Take 8 mg by mouth every eight (8) hours as needed for Nausea or Nausea or Vomiting. ibuprofen (MOTRIN) 200 mg tablet Take 800 mg by mouth as needed for Pain. Taken with food      acetaminophen (TYLENOL) 500 mg tablet Take 1,000 mg by mouth every six (6) hours as needed for Pain. budesonide-formoteroL (Symbicort) 160-4.5 mcg/actuation HFAA Take 1 Puff by inhalation two (2) times a day. 1 Each 2    triamcinolone acetonide (KENALOG) 0.1 % topical cream Apply  to affected area four (4) times daily as needed. cyclobenzaprine (FLEXERIL) 10 mg tablet Take 1 Tablet by mouth three (3) times daily as needed for Muscle Spasm(s). 60 Tablet 1    albuterol (PROVENTIL HFA, VENTOLIN HFA, PROAIR HFA) 90 mcg/actuation inhaler Take 2 Puffs by inhalation every six (6) hours as needed for Wheezing. 1 Each 5    methocarbamoL (ROBAXIN) 500 mg tablet Take 1 Tablet by mouth four (4) times daily. 30 Tablet 2     Allergies   Allergen Reactions    Iodine And Iodide Containing Products Hives       /80   Pulse (!) 119   Resp 18   Ht 5' 6\" (1.676 m)   Wt 165 lb 3.2 oz (74.9 kg)   SpO2 95%   BMI 26.66 kg/m²     Gen: NAD, well appearing   Heart: RRR, no m/g/r  Lungs: Some wheezing and crackles in upper right lung field, other lung fields are overall clear without significant wheezing or crackles   Breast: R breast is edematous, slightly erythematous  Abd: Soft, non tender to palpation  Ext: No swelling  Psych: cooperative. Appropriate mood and affect.     Lab Results   Component Value Date/Time    WBC 6.6 10/01/2021 12:00 AM    HGB 14.6 10/01/2021 12:00 AM    HCT 43.6 10/01/2021 12:00 AM    PLATELET 845 24/25/5549 12:00 AM    MCV 91 10/01/2021 12:00 AM     Lab Results   Component Value Date/Time    Sodium 136 08/11/2021 10:15 AM    Potassium 3.2 08/11/2021 10:15 AM    Chloride 99 08/11/2021 10:15 AM    CO2 24 08/11/2021 10:15 AM    Anion gap 13 08/11/2021 10:15 AM    Glucose 118 (H) 08/11/2021 10:15 AM    BUN 13 08/11/2021 10:15 AM    Creatinine 0.60 (L) 08/11/2021 10:15 AM    BUN/Creatinine ratio 22 08/11/2021 10:15 AM    GFR est AA >60 08/11/2021 10:15 AM    GFR est non-AA >60 08/11/2021 10:15 AM    Calcium 9.1 08/11/2021 10:15 AM    Bilirubin, total 0.8 08/11/2021 10:15 AM    Alk. phosphatase 72 08/11/2021 10:15 AM    Protein, total 7.6 08/11/2021 10:15 AM    Albumin 4.4 08/11/2021 10:15 AM    Globulin 3.2 08/11/2021 10:15 AM    A-G Ratio 1.4 08/11/2021 10:15 AM    ALT (SGPT) 31 08/11/2021 10:15 AM    AST (SGOT) 26 08/11/2021 10:15 AM     No results found for: CHOL, CHOLPOCT, CHOLX, CHLST, CHOLV, TOTCHOLEXT, HDL, HDLPOC, HDLEXT, HDLP, LDL, LDLCPOC, LDLCEXT, LDLC, DLDLP, VLDLC, VLDL, TGLX, TRIGL, TRIGLYCEXT, TRIGP, TGLPOCT, CHHD, CHHDX    On this date 09/14/22 I have spent 45 minutes reviewing previous notes, test results and face to face with the patient for interview/exam, discussing working diagnosis and treatment plan as well as documenting on the day of the visit. Medical decision making complexity: moderate-high    I have discussed the diagnosis with the patient and the intended plan as seen in the above orders. The patient has received an after-visit summary and questions were answered concerning future plans. I have discussed medication side effects and warnings with the patient as well. I have reviewed the plan of care with the patient, accepted their input and they are in agreement with the treatment goals. Previous lab and imaging results were reviewed by me.      Yasmin Turcios MD   Family Medicine

## 2022-09-14 NOTE — PROGRESS NOTES
Diana Wade presents today for   Chief Complaint   Patient presents with    \[facility]\"" Care     79 y/o F present to clinic to est. [facility]. Is someone accompanying this pt? No    Is the patient using any DME equipment during OV? NO    Depression Screening:  3 most recent PHQ Screens 9/14/2022   Little interest or pleasure in doing things Not at all   Feeling down, depressed, irritable, or hopeless Not at all   Total Score PHQ 2 0       Learning Assessment:  Learning Assessment 9/10/2021   PRIMARY LEARNER Patient   PRIMARY LANGUAGE ENGLISH   LEARNER PREFERENCE PRIMARY DEMONSTRATION   ANSWERED BY Patient   RELATIONSHIP SELF       Fall Risk  Fall Risk Assessment, last 12 mths 9/14/2022   Able to walk? Yes   Fall in past 12 months? 1   Do you feel unsteady? 1   Are you worried about falling 1   Number of falls in past 12 months 2   Fall with injury? 1       Health Maintenance reviewed and discussed and ordered per Provider. Health Maintenance Due   Topic Date Due    Hepatitis C Screening  Never done    Pneumococcal 65+ years (1 - PCV) Never done    DTaP/Tdap/Td series (1 - Tdap) Never done    Lipid Screen  Never done    Colorectal Cancer Screening Combo  Never done    Shingrix Vaccine Age 50> (1 of 2) Never done    Low dose CT lung screening  Never done    Bone Densitometry (Dexa) Screening  Never done    COVID-19 Vaccine (3 - Booster for Pfizer series) 03/19/2022    Flu Vaccine (1) 09/01/2022    Medicare Yearly Exam  09/21/2022   . Coordination of Care:    1. \"Have you been to the ER, urgent care clinic since your last visit? Hospitalized since your last visit? \" Yes Where: [address] Left with out being seen  Reason for visit: Swelling     2. \"Have you seen or consulted any other health care providers outside of the [address] [place] [address] since your last visit? \" No     3. For patients aged 39-70: Has the patient had a colonoscopy? No     If the patient is female:    4.  For patients aged 41-77: Has the patient had a mammogram within the past 2 years? Yes - no Care Gap present    5. For patients aged 21-65: Has the patient had a pap smear?  NA - based on age

## 2022-09-15 ENCOUNTER — TELEPHONE (OUTPATIENT)
Dept: FAMILY MEDICINE CLINIC | Age: 67
End: 2022-09-15

## 2022-09-15 DIAGNOSIS — R92.8 ABNORMAL MAMMOGRAM: Primary | ICD-10-CM

## 2022-09-15 LAB
ALBUMIN SERPL-MCNC: 4.3 G/DL (ref 3.8–4.8)
ALBUMIN/GLOB SERPL: 1.7 {RATIO} (ref 1.2–2.2)
ALP SERPL-CCNC: 100 IU/L (ref 44–121)
ALT SERPL-CCNC: 11 IU/L (ref 0–32)
AST SERPL-CCNC: 13 IU/L (ref 0–40)
BASOPHILS # BLD AUTO: 0 X10E3/UL (ref 0–0.2)
BASOPHILS NFR BLD AUTO: 0 %
BILIRUB SERPL-MCNC: 0.3 MG/DL (ref 0–1.2)
BUN SERPL-MCNC: 6 MG/DL (ref 8–27)
BUN/CREAT SERPL: 10 (ref 12–28)
CALCIUM SERPL-MCNC: 10.2 MG/DL (ref 8.7–10.3)
CHLORIDE SERPL-SCNC: 94 MMOL/L (ref 96–106)
CO2 SERPL-SCNC: 27 MMOL/L (ref 20–29)
CREAT SERPL-MCNC: 0.61 MG/DL (ref 0.57–1)
EGFR: 99 ML/MIN/1.73
EOSINOPHIL # BLD AUTO: 0.2 X10E3/UL (ref 0–0.4)
EOSINOPHIL NFR BLD AUTO: 2 %
ERYTHROCYTE [DISTWIDTH] IN BLOOD BY AUTOMATED COUNT: 14.3 % (ref 11.7–15.4)
GLOBULIN SER CALC-MCNC: 2.5 G/DL (ref 1.5–4.5)
GLUCOSE SERPL-MCNC: 109 MG/DL (ref 65–99)
HCT VFR BLD AUTO: 34.9 % (ref 34–46.6)
HGB BLD-MCNC: 11.7 G/DL (ref 11.1–15.9)
IMM GRANULOCYTES # BLD AUTO: 0 X10E3/UL (ref 0–0.1)
IMM GRANULOCYTES NFR BLD AUTO: 0 %
LYMPHOCYTES # BLD AUTO: 1.7 X10E3/UL (ref 0.7–3.1)
LYMPHOCYTES NFR BLD AUTO: 20 %
MCH RBC QN AUTO: 28.7 PG (ref 26.6–33)
MCHC RBC AUTO-ENTMCNC: 33.5 G/DL (ref 31.5–35.7)
MCV RBC AUTO: 86 FL (ref 79–97)
MONOCYTES # BLD AUTO: 0.5 X10E3/UL (ref 0.1–0.9)
MONOCYTES NFR BLD AUTO: 6 %
NEUTROPHILS # BLD AUTO: 6.4 X10E3/UL (ref 1.4–7)
NEUTROPHILS NFR BLD AUTO: 72 %
NT-PROBNP SERPL-MCNC: 206 PG/ML (ref 0–301)
PLATELET # BLD AUTO: 414 X10E3/UL (ref 150–450)
POTASSIUM SERPL-SCNC: 3 MMOL/L (ref 3.5–5.2)
PROT SERPL-MCNC: 6.8 G/DL (ref 6–8.5)
RBC # BLD AUTO: 4.08 X10E6/UL (ref 3.77–5.28)
SODIUM SERPL-SCNC: 142 MMOL/L (ref 134–144)
WBC # BLD AUTO: 8.9 X10E3/UL (ref 3.4–10.8)

## 2022-09-16 DIAGNOSIS — E87.6 HYPOKALEMIA: Primary | ICD-10-CM

## 2022-09-16 DIAGNOSIS — R60.9 SWELLING: Primary | ICD-10-CM

## 2022-09-16 DIAGNOSIS — I87.1: ICD-10-CM

## 2022-09-16 RX ORDER — POTASSIUM CHLORIDE 20 MEQ/1
TABLET, EXTENDED RELEASE ORAL
Qty: 4 TABLET | Refills: 0 | Status: SHIPPED | OUTPATIENT
Start: 2022-09-16 | End: 2022-09-19

## 2022-09-16 NOTE — PROGRESS NOTES
Spoke with patient on the phone this morning about CXR and lab tests. CXR is clear- no fluid in the lungs, no signs of infection- however it did show that her stent is compressed- we talked about following up with the vascular surgeon that did her procedure- she has the information and will call, can contact our office and we will help facilitate if any issues. K is quite low, likely from Lasix. We have held Lasix, I will send potassium repletion to pharmacy, 40mEQ x 2 doses, discussed K rich foods as well, will repeat K level at apt next week.      ALCON Mike

## 2022-09-19 ENCOUNTER — OFFICE VISIT (OUTPATIENT)
Dept: FAMILY MEDICINE CLINIC | Age: 67
End: 2022-09-19
Payer: MEDICARE

## 2022-09-19 VITALS
HEIGHT: 66 IN | TEMPERATURE: 97.2 F | SYSTOLIC BLOOD PRESSURE: 124 MMHG | HEART RATE: 99 BPM | WEIGHT: 167.8 LBS | OXYGEN SATURATION: 97 % | RESPIRATION RATE: 20 BRPM | DIASTOLIC BLOOD PRESSURE: 77 MMHG | BODY MASS INDEX: 26.97 KG/M2

## 2022-09-19 DIAGNOSIS — R60.9 SWELLING: ICD-10-CM

## 2022-09-19 DIAGNOSIS — R92.8 ABNORMAL MAMMOGRAM: ICD-10-CM

## 2022-09-19 DIAGNOSIS — R11.0 NAUSEA: ICD-10-CM

## 2022-09-19 DIAGNOSIS — E87.6 HYPOKALEMIA: Primary | ICD-10-CM

## 2022-09-19 DIAGNOSIS — R06.02 SHORTNESS OF BREATH: ICD-10-CM

## 2022-09-19 PROCEDURE — 99214 OFFICE O/P EST MOD 30 MIN: CPT | Performed by: STUDENT IN AN ORGANIZED HEALTH CARE EDUCATION/TRAINING PROGRAM

## 2022-09-19 PROCEDURE — 1123F ACP DISCUSS/DSCN MKR DOCD: CPT | Performed by: STUDENT IN AN ORGANIZED HEALTH CARE EDUCATION/TRAINING PROGRAM

## 2022-09-19 NOTE — PROGRESS NOTES
Hesham Mckeon presents today for   Chief Complaint   Patient presents with    Follow-up     1 week follow up        Is someone accompanying this pt? no    Is the patient using any DME equipment during [address]? no    Depression Screening:  3 most recent PHQ Screens 9/19/2022   Little interest or pleasure in doing things Not at all   Feeling down, depressed, irritable, or hopeless Not at all   Total Score PHQ 2 0       Learning Assessment:  Learning Assessment 9/10/2021   PRIMARY LEARNER Patient   PRIMARY LANGUAGE ENGLISH   LEARNER PREFERENCE PRIMARY DEMONSTRATION   ANSWERED BY Patient   RELATIONSHIP SELF       Fall Risk  Fall Risk Assessment, last 12 mths 9/19/2022   Able to walk? Yes   Fall in past 12 months? 1   Do you feel unsteady? 1   Are you worried about falling 1   Is TUG test greater than 12 seconds? 0   Is the gait abnormal? 0   Number of falls in past 12 months 2   Fall with injury? 0       ADL  ADL Assessment 9/19/2022   Feeding yourself No Help Needed   Getting from bed to chair No Help Needed   Getting dressed No Help Needed   Bathing or showering No Help Needed   Walk across the room (includes cane/walker) No Help Needed   Using the telphone No Help Needed   Taking your medications No Help Needed   Preparing meals No Help Needed   Managing money (expenses/bills) No Help Needed   Moderately strenuous housework (laundry) No Help Needed   Shopping for personal items (toiletries/medicines) No Help Needed   Shopping for groceries Help Needed   Driving Help Needed   Climbing a flight of stairs No Help Needed   Getting to places beyond walking distances No Help Needed       Health Maintenance reviewed and discussed and ordered per Provider.     Health Maintenance Due   Topic Date Due    Hepatitis C Screening  Never done    Pneumococcal 65+ years (1 - PCV) Never done    DTaP/Tdap/Td series (1 - Tdap) Never done    Lipid Screen  Never done    Colorectal Cancer Screening Combo  Never done    Shingrix Vaccine Age 50> (1 of 2) Never done    Low dose CT lung screening  Never done    Bone Densitometry (Dexa) Screening  Never done    COVID-19 Vaccine (4 - Booster for Pfizer series) 08/10/2022    Flu Vaccine (1) 09/01/2022    Medicare Yearly Exam  09/21/2022   . Coordination of Care:  1. \"Have you been to the ER, urgent care clinic since your last visit? Hospitalized since your last visit? \" No    2. \"Have you seen or consulted any other health care providers outside of the [address] [place] [address] since your last visit? \" No     3. For patients aged 39-70: Has the patient had a colonoscopy? No     If the patient is female:    4. For patients aged 41-77: Has the patient had a mammogram within the past 2 years? Yes - no Care Gap present    5. For patients aged 21-65: Has the patient had a pap smear?  NA - based on age

## 2022-09-19 NOTE — PROGRESS NOTES
Bhupendra Martinez (: 1955) is a 78 y.o. female here for evaluation of the following chief concerns(s):  Follow-up (1 week follow up )       ASSESSMENT/PLAN:  1. Hypokalemia  Patient has completed potassium repletion that was prescribed. She has an infusion at the cancer center on Wednesday, and will have labs drawn prior to that. I have given her fax number that she can have the labs faxed over to us as well for review. We discussed potassium rich foods. She is not currently taking Lasix. 2. Abnormal mammogram  Patient has her repeat mammogram and ultrasound scheduled for next week. We will review the results when available. 3. Swelling  Patient still has quite a bit of swelling in the right breast, she really does not have any swelling elsewhere in the body. Given the results of the chest x-ray, I am suspicious that the swelling in the right breast is secondary to the compression of the stent that was placed in her subclavian vein, she is in the process of scheduling follow-up with her vascular surgeon. We will review note when available. In the meantime I would hold off on the Lasix, I think she is still on the dry side. She is monitoring daily weights, and they have been relatively stable. She does have echo scheduled to look at her heart in the next month. 4. Shortness of breath  Stable. No worse than it over the last couple months. Her O2 saturation while walking is 92 to 93%. 5. Nausea   Patient states she is nauseated, she threw up yesterday and it was a yellow bile-like material.  Has not thrown up since. She has Zofran at home from her oncologist that she is taking occasionally. Her exam is relatively normal today, no red flag signs. Also we discussed ER precautions if she continues to have vomiting. She is treating her constipation with Dulcolax, can continue-recommend adequate hydration as well. Return in about 1 month (around 10/19/2022). Sooner as needed.      Chen Dwyer Samm Bustillo agrees with plan as above and has no additional questions at this time. SUBJECTIVE/OBJECTIVE:  Overall, pt is feeling improved. She states her weakness is improved, her dizziness has almost completely resolved. She is no longer taking Lasix. Her daily weight is 166 to 167 pounds and has been since she saw me last.  She took the potassium repletion, although they made her very nauseated and she actually did throw up a yellow thick material.  She denies any significant abdominal pain, although states she is a little bit constipated-her last bowel movement was on Saturday and it was very small,. She took Dulcolax yesterday. Her swelling has been stable. She still is very swollen in the right breast.  We reviewed that chest x-ray together, report showed that the stent that was placed in her subclavian vein was compressed on the top. She follows with vascular surgery and is in the process of making an appointment to get back in with them. She denies any new swelling. She denies any new shortness of breath, has been unchanged over the past couple of months.     Denies fever/chills, chest pain,  abdominal pain, leg swelling    Past Medical History:   Diagnosis Date    Abnormal CXR     Arthritis     Bronchitis     Cancer ([address]     RT lung    Chronic obstructive pulmonary disease ([address]     COVID-19     Menopause      Past Surgical History:   Procedure Laterality Date    HX HYSTERECTOMY  1987    TOSHA due to endometrosis, at age 28    HX OOPHORECTOMY       Family History   Problem Relation Age of Onset    Heart Disease Mother     Diabetes Father     Cancer Father         Merkel cell in his hand to lymph nodes    COPD Brother     Emphysema Brother     Uterine Cancer Maternal Aunt     Colon Cancer Maternal Uncle     Cancer Paternal Aunt         brain    Cancer Paternal Uncle         Brain tumor       Social History     Socioeconomic History    Marital status:    Tobacco Use    Smoking status: Every Day     Packs/day: 1.00     Years: 40.00     Pack years: 40.00     Types: Cigarettes    Smokeless tobacco: Never   Vaping Use    Vaping Use: Never used   Substance and Sexual Activity    Alcohol use: Yes     Comment: seldom    Drug use: Not Currently     Types: Marijuana     Social History     Tobacco Use   Smoking Status Every Day    Packs/day: 1.00    Years: 40.00    Pack years: 40.00    Types: Cigarettes   Smokeless Tobacco Never       Current Outpatient Medications   Medication Sig Dispense Refill    famotidine (PEPCID) 20 mg tablet       benzonatate (TESSALON) 100 mg capsule Take 1 Capsule by mouth. ondansetron hcl (ZOFRAN) 8 mg tablet Take 8 mg by mouth every eight (8) hours as needed for Nausea or Nausea or Vomiting. ibuprofen (MOTRIN) 200 mg tablet Take 800 mg by mouth as needed for Pain. Taken with food      acetaminophen (TYLENOL) 500 mg tablet Take 1,000 mg by mouth every six (6) hours as needed for Pain. budesonide-formoteroL (Symbicort) 160-4.5 mcg/actuation HFAA Take 1 Puff by inhalation two (2) times a day. 1 Each 2    triamcinolone acetonide (KENALOG) 0.1 % topical cream Apply  to affected area four (4) times daily as needed. cyclobenzaprine (FLEXERIL) 10 mg tablet Take 1 Tablet by mouth three (3) times daily as needed for Muscle Spasm(s). 60 Tablet 1    albuterol (PROVENTIL HFA, VENTOLIN HFA, PROAIR HFA) 90 mcg/actuation inhaler Take 2 Puffs by inhalation every six (6) hours as needed for Wheezing. 1 Each 5    methocarbamoL (ROBAXIN) 500 mg tablet Take 1 Tablet by mouth four (4) times daily.  30 Tablet 2     Allergies   Allergen Reactions    Iodine And Iodide Containing Products Hives    Olanzapine Other (comments)       /77 (BP 1 Location: Right arm, BP Patient Position: Sitting, BP Cuff Size: Adult)   Pulse 99   Temp 97.2 °F (36.2 °C) (Temporal)   Resp 20   Ht 5' 6\" (1.676 m)   Wt 167 lb 12.8 oz (76.1 kg)   SpO2 97%   BMI 27.08 kg/m²     Gen: NAD, well appearing Heart: RRR, no m/g/r  Lungs: CTA bilaterally, no wheezing, breathing comfortably  Abd: Soft, mild tenderness to palpation in the epigastric area, hypoactive bowel sounds  Ext: No swelling in the LE  Psych: cooperative. Appropriate mood and affect. Lab Results   Component Value Date/Time    WBC 8.9 09/14/2022 12:00 AM    HGB 11.7 09/14/2022 12:00 AM    HCT 34.9 09/14/2022 12:00 AM    PLATELET 685 00/14/5160 12:00 AM    MCV 86 09/14/2022 12:00 AM     Lab Results   Component Value Date/Time    Sodium 142 09/14/2022 12:00 AM    Potassium 3.0 (L) 09/14/2022 12:00 AM    Chloride 94 (L) 09/14/2022 12:00 AM    CO2 27 09/14/2022 12:00 AM    Anion gap 13 08/11/2021 10:15 AM    Glucose 109 (H) 09/14/2022 12:00 AM    BUN 6 (L) 09/14/2022 12:00 AM    Creatinine 0.61 09/14/2022 12:00 AM    BUN/Creatinine ratio 10 (L) 09/14/2022 12:00 AM    GFR est AA >60 08/11/2021 10:15 AM    GFR est non-AA >60 08/11/2021 10:15 AM    Calcium 10.2 09/14/2022 12:00 AM    Bilirubin, total 0.3 09/14/2022 12:00 AM    Alk. phosphatase 100 09/14/2022 12:00 AM    Protein, total 6.8 09/14/2022 12:00 AM    Albumin 4.3 09/14/2022 12:00 AM    Globulin 3.2 08/11/2021 10:15 AM    A-G Ratio 1.7 09/14/2022 12:00 AM    ALT (SGPT) 11 09/14/2022 12:00 AM    AST (SGOT) 13 09/14/2022 12:00 AM     No results found for: CHOL, CHOLPOCT, CHOLX, CHLST, CHOLV, TOTCHOLEXT, HDL, HDLPOC, HDLEXT, HDLP, LDL, LDLCPOC, LDLCEXT, LDLC, DLDLP, VLDLC, VLDL, TGLX, TRIGL, TRIGLYCEXT, TRIGP, TGLPOCT, CHHD, CHHDX    On this date 09/19/22 I have spent 30 minutes reviewing previous notes, test results and face to face with the patient for interview/exam, discussing working diagnosis and treatment plan as well as documenting on the day of the visit. Medical decision making complexity: moderate-high    I have discussed the diagnosis with the patient and the intended plan as seen in the above orders.   The patient has received an after-visit summary and questions were answered concerning future plans. I have discussed medication side effects and warnings with the patient as well. I have reviewed the plan of care with the patient, accepted their input and they are in agreement with the treatment goals. Previous lab and imaging results were reviewed by me.      Casey Jurado MD   Family Medicine

## 2022-09-23 ENCOUNTER — TELEPHONE (OUTPATIENT)
Dept: FAMILY MEDICINE CLINIC | Age: 67
End: 2022-09-23

## 2022-09-23 DIAGNOSIS — R11.2 NAUSEA AND VOMITING, UNSPECIFIED VOMITING TYPE: Primary | ICD-10-CM

## 2022-09-23 NOTE — TELEPHONE ENCOUNTER
Spoke with patient on the phone this AM.   Did not go to her infusion on Wednesday, was feeling too ill. Patient sates she is having a lot of nausea, and has vomited most mornings- she states the vomiting is mostly related to coughing, she is coughing up lots of clear mucous. Vomit is non-bilious. She denies any fevers/chills, no particular abdominal pain. Her weight has been stable and she is no longer taking Lasix. She is able to drink Ensure, but not able to eat much else. She has Zofran at home that was prescribed by her oncologist that she has been using. We discussed ER precautions, especially since we are approaching the weekend. Patient has her follow up mammogram scheduled on Tuesday and states that she can have some additional labs done while she is there- will order BMP to recheck K that we re-pleated last week. Will also add on some additional labs for her nausea/vomiting CBC, lipase, TSH, LFT's - however I would really recommend her go to the ER, she likely needs some imaging of her belly given her complicated medical history.      ALCON Mike

## 2022-09-27 ENCOUNTER — TELEPHONE (OUTPATIENT)
Dept: FAMILY MEDICINE CLINIC | Age: 67
End: 2022-09-27

## 2022-09-27 ENCOUNTER — HOSPITAL ENCOUNTER (OUTPATIENT)
Dept: LAB | Age: 67
Discharge: HOME | End: 2022-09-27
Attending: STUDENT IN AN ORGANIZED HEALTH CARE EDUCATION/TRAINING PROGRAM

## 2022-09-27 PROCEDURE — 99001 SPECIMEN HANDLING PT-LAB: CPT

## 2022-09-27 NOTE — TELEPHONE ENCOUNTER
Dion Paul from radiology called regarding the patient. Just wanted to inform you they will not be doing her mammogram today. States they are going to wait until her vascular issues become a little better. Will perform the mammogram at a later date.      Mayelin's call back info;  [phone]

## 2022-09-28 DIAGNOSIS — R11.2 NAUSEA AND VOMITING, UNSPECIFIED VOMITING TYPE: Primary | ICD-10-CM

## 2022-09-28 LAB
ALBUMIN SERPL-MCNC: 3.9 G/DL (ref 3.8–4.8)
ALBUMIN/GLOB SERPL: 1.6 {RATIO} (ref 1.2–2.2)
ALP SERPL-CCNC: 86 IU/L (ref 44–121)
ALT SERPL-CCNC: 9 IU/L (ref 0–32)
AST SERPL-CCNC: 13 IU/L (ref 0–40)
BASOPHILS # BLD AUTO: 0 X10E3/UL (ref 0–0.2)
BASOPHILS NFR BLD AUTO: 0 %
BILIRUB SERPL-MCNC: 0.3 MG/DL (ref 0–1.2)
BUN SERPL-MCNC: 7 MG/DL (ref 8–27)
BUN/CREAT SERPL: 14 (ref 12–28)
CALCIUM SERPL-MCNC: 10 MG/DL (ref 8.7–10.3)
CHLORIDE SERPL-SCNC: 95 MMOL/L (ref 96–106)
CO2 SERPL-SCNC: 27 MMOL/L (ref 20–29)
CREAT SERPL-MCNC: 0.49 MG/DL (ref 0.57–1)
EGFR: 104 ML/MIN/1.73
EOSINOPHIL # BLD AUTO: 0.1 X10E3/UL (ref 0–0.4)
EOSINOPHIL NFR BLD AUTO: 1 %
ERYTHROCYTE [DISTWIDTH] IN BLOOD BY AUTOMATED COUNT: 13.7 % (ref 11.7–15.4)
GLOBULIN SER CALC-MCNC: 2.4 G/DL (ref 1.5–4.5)
GLUCOSE SERPL-MCNC: 112 MG/DL (ref 70–99)
HCT VFR BLD AUTO: 33.4 % (ref 34–46.6)
HGB BLD-MCNC: 11.3 G/DL (ref 11.1–15.9)
IMM GRANULOCYTES # BLD AUTO: 0 X10E3/UL (ref 0–0.1)
IMM GRANULOCYTES NFR BLD AUTO: 0 %
LIPASE SERPL-CCNC: 17 U/L (ref 14–72)
LYMPHOCYTES # BLD AUTO: 1.2 X10E3/UL (ref 0.7–3.1)
LYMPHOCYTES NFR BLD AUTO: 16 %
MCH RBC QN AUTO: 28.5 PG (ref 26.6–33)
MCHC RBC AUTO-ENTMCNC: 33.8 G/DL (ref 31.5–35.7)
MCV RBC AUTO: 84 FL (ref 79–97)
MONOCYTES # BLD AUTO: 0.5 X10E3/UL (ref 0.1–0.9)
MONOCYTES NFR BLD AUTO: 7 %
NEUTROPHILS # BLD AUTO: 5.5 X10E3/UL (ref 1.4–7)
NEUTROPHILS NFR BLD AUTO: 76 %
PLATELET # BLD AUTO: 427 X10E3/UL (ref 150–450)
POTASSIUM SERPL-SCNC: 3.6 MMOL/L (ref 3.5–5.2)
PROT SERPL-MCNC: 6.3 G/DL (ref 6–8.5)
RBC # BLD AUTO: 3.96 X10E6/UL (ref 3.77–5.28)
SODIUM SERPL-SCNC: 143 MMOL/L (ref 134–144)
TSH SERPL DL<=0.005 MIU/L-ACNC: 2.82 UIU/ML (ref 0.45–4.5)
WBC # BLD AUTO: 7.3 X10E3/UL (ref 3.4–10.8)

## 2022-09-28 NOTE — PROGRESS NOTES
Spoke with patient on the phone this morning regarding her lab work. Potassium is much improved. Her other labs were unremarkable. She is still vomiting after eating. She states she feels like things are getting stuck in her throat. She also reports that the vomiting is related to coughing fits. She did have radiation of the neck and right upper lung, I wonder if she may need esophageal dilation? She still denies any abdominal pain. She has not had a bowel movement in 1 week however has really not eaten much other than Ensure. She is passing gas. She is taking Zofran and this helps some. I am going to refer patient to GI for further evaluation and possibly an EGD. Continue Pepcid as needed, can take up to twice a day. And continue Zofran as needed as well. Continue Tessalon Perles, can also trial some OTC cough syrup since it sounds like at least some of the vomiting is posttussive. Encourage small frequent meals as tolerated. Patient has repeat CT scan of the chest as well as an appointment with her oncologist coming up in the next couple of weeks as well which will be helpful.     ALCON Mike

## 2022-10-06 ENCOUNTER — OFFICE VISIT (OUTPATIENT)
Dept: GASTROENTEROLOGY | Age: 67
End: 2022-10-06
Payer: MEDICARE

## 2022-10-06 VITALS
WEIGHT: 163.4 LBS | HEIGHT: 66 IN | SYSTOLIC BLOOD PRESSURE: 115 MMHG | HEART RATE: 104 BPM | BODY MASS INDEX: 26.26 KG/M2 | DIASTOLIC BLOOD PRESSURE: 82 MMHG | OXYGEN SATURATION: 97 %

## 2022-10-06 DIAGNOSIS — R13.19 OTHER DYSPHAGIA: ICD-10-CM

## 2022-10-06 DIAGNOSIS — R11.2 NAUSEA AND VOMITING, UNSPECIFIED VOMITING TYPE: Primary | ICD-10-CM

## 2022-10-06 DIAGNOSIS — R63.4 WEIGHT LOSS: ICD-10-CM

## 2022-10-06 DIAGNOSIS — I87.1 SUPERIOR VENA CAVA SYNDROME: ICD-10-CM

## 2022-10-06 DIAGNOSIS — J44.9 CHRONIC OBSTRUCTIVE PULMONARY DISEASE, UNSPECIFIED COPD TYPE (HCC): ICD-10-CM

## 2022-10-06 DIAGNOSIS — C34.11 MALIGNANT NEOPLASM OF UPPER LOBE OF RIGHT LUNG (HCC): ICD-10-CM

## 2022-10-06 DIAGNOSIS — Z12.11 COLON CANCER SCREENING: ICD-10-CM

## 2022-10-06 PROCEDURE — G8536 NO DOC ELDER MAL SCRN: HCPCS | Performed by: INTERNAL MEDICINE

## 2022-10-06 PROCEDURE — G8510 SCR DEP NEG, NO PLAN REQD: HCPCS | Performed by: INTERNAL MEDICINE

## 2022-10-06 PROCEDURE — G8427 DOCREV CUR MEDS BY ELIG CLIN: HCPCS | Performed by: INTERNAL MEDICINE

## 2022-10-06 PROCEDURE — 1123F ACP DISCUSS/DSCN MKR DOCD: CPT | Performed by: INTERNAL MEDICINE

## 2022-10-06 PROCEDURE — G8400 PT W/DXA NO RESULTS DOC: HCPCS | Performed by: INTERNAL MEDICINE

## 2022-10-06 PROCEDURE — 3017F COLORECTAL CA SCREEN DOC REV: CPT | Performed by: INTERNAL MEDICINE

## 2022-10-06 PROCEDURE — G8417 CALC BMI ABV UP PARAM F/U: HCPCS | Performed by: INTERNAL MEDICINE

## 2022-10-06 PROCEDURE — 1101F PT FALLS ASSESS-DOCD LE1/YR: CPT | Performed by: INTERNAL MEDICINE

## 2022-10-06 PROCEDURE — 1090F PRES/ABSN URINE INCON ASSESS: CPT | Performed by: INTERNAL MEDICINE

## 2022-10-06 PROCEDURE — G9899 SCRN MAM PERF RSLTS DOC: HCPCS | Performed by: INTERNAL MEDICINE

## 2022-10-06 PROCEDURE — 99204 OFFICE O/P NEW MOD 45 MIN: CPT | Performed by: INTERNAL MEDICINE

## 2022-10-06 RX ORDER — LORATADINE 10 MG/1
10 TABLET ORAL DAILY
COMMUNITY
Start: 2022-09-23

## 2022-10-06 RX ORDER — FUROSEMIDE 20 MG/1
20 TABLET ORAL AS NEEDED
COMMUNITY
End: 2022-10-31

## 2022-10-06 RX ORDER — PREDNISONE 50 MG/1
50 TABLET ORAL AS NEEDED
COMMUNITY
Start: 2022-09-27 | End: 2022-10-31

## 2022-10-06 NOTE — PROGRESS NOTES
Referring Physician:  Veronica Coronado MD     Chief Complaint: Nausea and vomiting, colon cancer screening    Date of service: 10/6/2022    Subjective:     History of Present Illness:  Patient is a female [address] [facility] 78 y.o.  who is seen for evaluation for colon cancer screening and nausea and vomiting. She is presently dealing with a new diagnosis of right and lung cancer that is around the hilar area of the lung and includes mediastinal invasion. 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 A recent chest x-ray showed compression at the top of the stent by presumed tumor invasion or external compression and she is supposed to see vascular surgery again. She has been treated with radiation therapy and now chemotherapy,  to her neck and chest.  This is been evaluated by pulmonology and is to be evaluated followed by oncology and radiation oncology. She has since developed intermittent nausea and vomiting. Has occurred over the last 2 weeks. She reports coughing to the point she will have vomiting. She also will get nausea very easily, including just watching a cooking show. Nausea and vomiting can occur without eating or drinking. Emesis is reported to be clear like phlegm or water. She is tried Zofran for the nausea with some success, using it as needed. She does not need to use it or vomit daily. She has lost 30 pounds in the last 10 months. She also reports she has regurgitation and a feeling like food is getting stuck in her throat. Problems at times swallowing bread or dry foods. She denies any reflux. She also uses false teeth that will make her gag at times. The patient denies  fever, chills, abdominal pain, reflux, dysphagia, change in bowel habits, diarrhea, constipation,  rectal bleeding, or melena. Last EGD-never. Last colonoscopy - 1987- no polyps.   Family history for GI disease is uncle had colon cancer, mother had polyps. The patient denies liver related risk factors. Tobacco use- 1 ppd more in past.  Alcohol use- none. Past medical history is significant for recent diagnosis of cancer of the right lung as above, COPD, bronchitis, TOHSA/BSO for endometriosis. There is also concern with possible right breast swelling and malignancy. She has had a TOSHA/BSO for endometriosis. PMH:  Past Medical History:   Diagnosis Date    Abnormal CXR     Arthritis     Bronchitis     Cancer ([address]     RT lung    Chronic obstructive pulmonary disease ([address]     COVID-19     Menopause         PSH:  Past Surgical History:   Procedure Laterality Date    HX HYSTERECTOMY  1987    TOSHA due to endometrosis, at age 28    HX OOPHORECTOMY          Allergies: Allergies   Allergen Reactions    Iodine And Iodide Containing Products Hives    Olanzapine Other (comments)        Home Medications:  Cannot display prior to admission medications because the patient has not been admitted in this contact. Hospital Medications:  Current Outpatient Medications   Medication Sig    loratadine (CLARITIN) 10 mg tablet Take 10 mg by mouth daily. furosemide (LASIX) 20 mg tablet Take 20 mg by mouth as needed. famotidine (PEPCID) 20 mg tablet     benzonatate (TESSALON) 100 mg capsule Take 1 Capsule by mouth. ondansetron hcl (ZOFRAN) 8 mg tablet Take 8 mg by mouth every eight (8) hours as needed for Nausea or Nausea or Vomiting. ibuprofen (MOTRIN) 200 mg tablet Take 800 mg by mouth as needed for Pain. Taken with food    budesonide-formoteroL (Symbicort) 160-4.5 mcg/actuation HFAA Take 1 Puff by inhalation two (2) times a day. triamcinolone acetonide (KENALOG) 0.1 % topical cream Apply  to affected area four (4) times daily as needed. albuterol (PROVENTIL HFA, VENTOLIN HFA, PROAIR HFA) 90 mcg/actuation inhaler Take 2 Puffs by inhalation every six (6) hours as needed for Wheezing.     predniSONE (DELTASONE) 50 mg tablet Take 50 mg by mouth as needed. acetaminophen (TYLENOL) 500 mg tablet Take 1,000 mg by mouth every six (6) hours as needed for Pain. (Patient not taking: Reported on 10/6/2022)    cyclobenzaprine (FLEXERIL) 10 mg tablet Take 1 Tablet by mouth three (3) times daily as needed for Muscle Spasm(s). (Patient not taking: Reported on 10/6/2022)    methocarbamoL (ROBAXIN) 500 mg tablet Take 1 Tablet by mouth four (4) times daily. (Patient not taking: Reported on 10/6/2022)     No current facility-administered medications for this visit. Social History:  Social History     Tobacco Use    Smoking status: Every Day     Packs/day: 1.00     Years: 40.00     Pack years: 40.00     Types: Cigarettes    Smokeless tobacco: Never   Substance Use Topics    Alcohol use: Yes     Comment: seldom        Pt denies any history of IV drug use, blood transfusions. Family History:  Family History   Problem Relation Age of Onset    Heart Disease Mother     Diabetes Father     Cancer Father         Merkel cell in his hand to lymph nodes    COPD Brother     Emphysema Brother     Uterine Cancer Maternal Aunt     Colon Cancer Maternal Uncle     Cancer Paternal Aunt         brain    Cancer Paternal Uncle         Brain tumor        Review of Systems:  A detailed 10 system ROS is obtained, with pertinent positives as listed above. All others are negative unless listed in history above. Constitutional denies fever chills, headache, or weight loss. Skin- denies lesions or rashes. HEENT- denies any vision or hearing problems, epistaxis, sore throat, or dental problems. Lungs- no shortness of breath or chest pain reported, no dyspnea on exertion. Cardiac- no palpitations or chest pain reported, including at rest or on exertion. GI-no abdominal pain, melena, rectal bleeding, reflux, dysphagia, jaundice, change in stool or urine color, constipation or diarrhea. Genitourinary -no dysuria or hematuria. Musculoskeletal-no muscle weakness or disuse or atrophy. Neurologic-no numbness, tingling, gait disturbance, or other abnormalities. Rheumatologic- patient denies any immune or rheumatologic diseases or symptoms. Endocrine- patient denies any endocrine abnormalities including thyroid disease or diabetes. Psychologic-patient denies depression, anxiety or emotional issues. No reported memory issues. Objective:     Physical Exam:  Vitals: Ht 5' 6\" (1.676 m)   Wt 74.1 kg (163 lb 6.4 oz)   BMI 26.37 kg/m²    Gen:  Pt is alert, cooperative, no acute distress. He is very frail appearing and chronically ill. Skin:  Extremities and face reveal no rashes. No puri erythema. No telangiectasias on the chest wall. HEENT: Sclerae anicteric. Extra-occular muscles are intact. No oral ulcers. No abnormal pigmentation of the lips. The neck is supple. Cardiovascular: Regular rate and rhythm. No murmurs, gallops, or rubs. Respiratory:  Comfortable breathing with no accessory muscle use. Decreased breath sounds anteriorly with no wheezes, rales, or rhonchi. GI:  Abdomen nondistended, soft, and nontender. Normal active bowel sounds. No enlargement of the liver or spleen. No masses palpable. Rectal:  Deferred  Musculoskeletal:   No costovertebral tenderness. No localized muscle weakness, or decreased range of motion. Extremities:  No palpable cords or pitting edema of the lower legs. Extremities have good range of motion. She does have some mild edema subjectively on the right side of her body. Neurological:  Gross memory appears intact. Patient is alert and oriented. Psychiatric:  Mood appears appropriate with judgement intact. Lymphatic:  No cervical or supraclavicular adenopathy.     Laboratory:        Lab Results   Component Value Date    LPSE 17 09/27/2022     09/27/2022    K 3.6 09/27/2022    CL 95 (L) 09/27/2022    CO2 27 09/27/2022    AGAP 13 08/11/2021     (H) 09/27/2022    BUN 7 (L) 09/27/2022    CREA 0.49 (L) 09/27/2022    BUCR 14 09/27/2022    GFRAA >60 08/11/2021    GFRNA >60 08/11/2021    CA 10.0 09/27/2022    TBILI 0.3 09/27/2022    AST 13 09/27/2022    ALT 9 09/27/2022    AP 86 09/27/2022    TP 6.3 09/27/2022    ALB 3.9 09/27/2022    GLOB 3.2 08/11/2021    AGRAT 1.6 09/27/2022    WBC 7.3 09/27/2022    RBC 3.96 09/27/2022    HGB 11.3 09/27/2022    HCT 33.4 (L) 09/27/2022    MCV 84 09/27/2022    MCH 28.5 09/27/2022    MCHC 33.8 09/27/2022    RDW 13.7 09/27/2022     09/27/2022    MPLV 11.1 08/11/2021    GRANS 76 09/27/2022    LYMPH 35 08/11/2021    MONOS 7 09/27/2022    EOS 1 09/27/2022    BASOS 0 09/27/2022    IG 0 09/27/2022    ANEU 5.5 09/27/2022    ABL 1.8 08/11/2021    ABM 0.5 09/27/2022    BRYAN 0.1 09/27/2022    ABB 0.0 09/27/2022    AIG 0.0 09/27/2022   results  Lab Results   Component Value Date    LPSE 17 09/27/2022     09/27/2022    K 3.6 09/27/2022    CL 95 (L) 09/27/2022    CO2 27 09/27/2022    AGAP 13 08/11/2021     (H) 09/27/2022    BUN 7 (L) 09/27/2022    CREA 0.49 (L) 09/27/2022    BUCR 14 09/27/2022    GFRAA >60 08/11/2021    GFRNA >60 08/11/2021    CA 10.0 09/27/2022    TBILI 0.3 09/27/2022    AST 13 09/27/2022    ALT 9 09/27/2022    AP 86 09/27/2022    TP 6.3 09/27/2022    ALB 3.9 09/27/2022    GLOB 3.2 08/11/2021    AGRAT 1.6 09/27/2022    WBC 7.3 09/27/2022    RBC 3.96 09/27/2022    HGB 11.3 09/27/2022    HCT 33.4 (L) 09/27/2022    MCV 84 09/27/2022    MCH 28.5 09/27/2022    MCHC 33.8 09/27/2022    RDW 13.7 09/27/2022     09/27/2022    MPLV 11.1 08/11/2021    GRANS 76 09/27/2022    LYMPH 35 08/11/2021    MONOS 7 09/27/2022    EOS 1 09/27/2022    BASOS 0 09/27/2022    IG 0 09/27/2022    ANEU 5.5 09/27/2022    ABL 1.8 08/11/2021    ABM 0.5 09/27/2022    BRYAN 0.1 09/27/2022    ABB 0.0 09/27/2022    AIG 0.0 09/27/2022        CT scan of abdomen and pelvis - CT Results (most recent):  Results from [place] encounter on 03/16/22    CT CHEST W CONT    Narrative  EXAM: CT CHEST W CONT    CLINICAL INDICATION/HISTORY: Follow-up following chemotherapy.  > Additional: None. COMPARISON: None. TECHNIQUE: Axial CT imaging from the thoracic inlet through the diaphragm with  intravenous contrast. Multiplanar reformats were generated. One or more dose  reduction techniques were used on this CT: automated exposure control,  adjustment of the mAs and/or kVp according to patient size, and iterative  reconstruction techniques. The specific techniques used on this CT exam have  been documented in the patient's electronic medical record. Digital Imaging and  Communications in Medicine (DICOM) format image data are available to  nonaffiliated external healthcare facilities or entities on a secure, media  free, reciprocally searchable basis with patient authorization for at least a  12-month period after this study. _______________    FINDINGS: Left IJ approach Mediport is in place with the catheter tubing  terminating in the SVC. LUNGS AND PLEURA: Irregular shaped infiltrative right suprahilar mass abutting  the mediastinum demonstrates interval decrease in size compared to the prior  examination estimated approximately 2.6 x 3.7 cm, previously 3.7 x 4.1 cm (image  27). There is slight mass effect along the right posterior lateral wall of the  adjacent SVC which is improved since the prior exam    There is opacity with volume loss in the anterior medial right upper lobe  adjacent to the mass. AIRWAYS: Questionable minimal amount of debris dependently within the lower  trachea. Otherwise unremarkable. MEDIASTINUM: Heart size is normal. No pericardial effusion. Scattered calcified  mediastinal lymph nodes are present most of the left. No enlarged mediastinal  lymph nodes. CHEST WALL: No suspicious osseous lesions. UPPER ABDOMEN: Calcified splenic granulomata as well as a punctate calcified  liver granuloma.   No suspicious lesions. _______________    Impression  Slight interval decrease in size of the known right suprahilar mass with  mediastinal invasion. Slightly mass effect on the adjacent SVC. Persistent  postobstructive pneumonitis in the anterior medial right upper lobe. Additional chronic ancillary findings as above. **   *    *         Abdominal US- No results  US Results (most recent):  Results from Hospital Encounter encounter on 06/14/22    US BREAST RT LIMITED=<3 QUAD    Narrative  EXAM: Bilateral digital screening mammogram, with CAD, including 3-D breast  tomosynthesis. INDICATION: 59-year-old female presents with right breast pain and breast  swelling. Personal history of right upper lobe lung cancer status post  radiation treatment and chemotherapy. COMPARISON: .2021. TECHNIQUE: Images were obtained with 3-D breast tomosynthesis and C-view  reconstruction of 2-D images. The 2-D images were viewed with computer aided  detection as an adjunct. Targeted real-time ultrasound examination of the  breast was performed by myself and the technologist.    _______________    FINDINGS:    The breast is heterogeneously dense, which may obscure small masses. There are  no suspicious masses, areas of architectural distortion, or microcalcifications  seen. Diffuse trabecular and skin thickening. Right breast ultrasound: Ultrasound was performed at the 9 o'clock position. There are no cystic or solid masses seen. There is diffuse skin thickening and edematous changes seen throughout the  breast.    Targeted physical examination of the right demonstrates soft nodularity without  discrete mass or overlying skin changes. Patient is warm to palpation. _______________    Impression  1. Interval development of diffuse trabecular and skin thickening without  discrete mass on mammogram or ultrasound. Differential would include  postirradiation state, mastitis, and inflammatory breast cancer.  Findings are  likely due to recent radiation treatment. Recommend short-term follow-up  mammogram (3 months). A result letter will be sent to the patient. Findings were also discussed with  the patient. Assessment Category 3: Probably benign. MRI and MRCP-  MRI Results (most recent):  [facility] 10[facility]18/21    MRI BRAIN W WO CONT    Narrative  EXAM: MRI brain without and with contrast 10/18/2021. CLINICAL INDICATION/HISTORY: Right-sided lung cancer. Staging evaluation. COMPARISON: None. TECHNIQUE: Multiplanar multisequential images of the brain were obtained before  and after the administration of  20cc of IV ProHance.  _______________    FINDINGS:    BRAIN AND POSTERIOR FOSSA: Mild atrophy and chronic small vessel changes are  present in the periventricular and subcortical white matter of both cerebral  hemispheres. The cerebellar tonsils are low-lying but do not meet criteria for  Chiari I malformation. Small foci of abnormal signal within the julien and left  corona radiata white matter may represent prominent perivascular spaces, chronic  small vessel change, and/or areas of old lacunar infarction. There is no intracranial hemorrhage, mass effect, or midline shift. There are  no significant additional areas of abnormal parenchymal signal. There are no  areas of restricted diffusion to suggest acute infarct. There is no abnormal  cerebral or cerebellar enhancement. EXTRA-AXIAL SPACES AND MENINGES: There are no abnormal extra-axial fluid  collections. .There is no abnormal meningeal enhancement. VASCULAR: The visualized portions of the intracranial carotid and  vertebrobasilar systems demonstrate patent appearing flow voids. CALVARIA: Unremarkable. SINUSES: Retention cysts are noted in both maxillary sinus antra. The  visualized portions of the paranasal sinuses are otherwise clear. CRANIOCERVICAL JUNCTION: Within normal limits for age.     OTHER: There is a cystic area in the nasopharynx which extends to the right of  midline, likely representing an incidental Tornwaldt cyst or a nasopharyngeal  retention cyst.  _______________    Impression  1. Mild atrophy and chronic small vessel changes with possible foci of old  lacunar infarction in the julien and left corona radiata. 2.  Otherwise, unremarkable evaluation. Specifically, no acute intracranial  abnormalities are identified and there is no abnormal enhancement to suggest  intracranial metastases. Assessment:     Right sided lung cancer. This is a cannot diagnosis and she has been evaluated by pulmonology including a PET scan which showed no spread outside the lung. She is developed superior vena cava syndrome with stent placement and is now having problems with sequelae related to radiation and malignancy. She is also undergoing planned treatment by both oncology and radiation oncology. COPD. This is not surprising especially given her long-time tobacco use. History of bronchitis. This is likely related to her tobacco use as above. Nausea and vomiting. I am suspicious this is not at all related to her digestive system but more likely related to the fact she has had radiation therapy, has some progressive malignancy with superior vena cava syndrome causing some external compression or involvement on the nerves that affect the esophagus and throat. The edema she complains of also is likely contributing as well. This likely could also be due to coughing to the point will cause unfortunately vomiting. It is also possible she has invasion of lung cancer into her esophagus with some external compression. This may not be fixable. Dysphagia. She does not appear to have true dysphagia but is possible. At risk for esophageal malignancy. Could also have oropharyngeal dysphagia due to nerve involvement or paraneoplastic syndrome from her lung cancer. Colon cancer screening.   She is now due for screening colonoscopy. Given her ongoing problems related to her lung cancer, I feel this should be deferred at this time. Plan:     EGD with dilatation with MAC. I discussed the techniques involved with the procedure as well as the risks, benefits, and alternatives including but not limited to bleeding, infection, perforation requiring emergent surgery, missing lesions, death, and anesthesia related complications with the patient. All questions and concerns were answered. The patient voiced understanding and agrees to proceed. Modified barium swallow to evaluate for oropharyngeal dysphagia.`  Colonoscopy with MAC in the future if/when she improves from her lung cancer. If the above evaluation is unrevealing for any significant GI tract pathology, then I suspect the patient's symptoms are not due to any GI etiology. The symptoms may be due to sequelae related to her lung cancer, superior vena cava syndrome, and radiation therapy. Further recommendations pending the patient's clinical course, if needed. The patient will follow up with me as needed.     Umang Hobbs MD

## 2022-10-06 NOTE — H&P (VIEW-ONLY)
Referring Physician:  Meri Antoine MD     Chief Complaint: Nausea and vomiting, colon cancer screening    Date of service: 10/6/2022    Subjective:     History of Present Illness:  Patient is a female [address] [facility] 78 y.o.  who is seen for evaluation for colon cancer screening and nausea and vomiting. She is presently dealing with a new diagnosis of right and lung cancer that is around the hilar area of the lung and includes mediastinal invasion. 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 A recent chest x-ray showed compression at the top of the stent by presumed tumor invasion or external compression and she is supposed to see vascular surgery again. She has been treated with radiation therapy and now chemotherapy,  to her neck and chest.  This is been evaluated by pulmonology and is to be evaluated followed by oncology and radiation oncology. She has since developed intermittent nausea and vomiting. Has occurred over the last 2 weeks. She reports coughing to the point she will have vomiting. She also will get nausea very easily, including just watching a cooking show. Nausea and vomiting can occur without eating or drinking. Emesis is reported to be clear like phlegm or water. She is tried Zofran for the nausea with some success, using it as needed. She does not need to use it or vomit daily. She has lost 30 pounds in the last 10 months. She also reports she has regurgitation and a feeling like food is getting stuck in her throat. Problems at times swallowing bread or dry foods. She denies any reflux. She also uses false teeth that will make her gag at times. The patient denies  fever, chills, abdominal pain, reflux, dysphagia, change in bowel habits, diarrhea, constipation,  rectal bleeding, or melena. Last EGD-never. Last colonoscopy - 1987- no polyps.   Family history for GI disease is uncle had colon cancer, mother had polyps. The patient denies liver related risk factors. Tobacco use- 1 ppd more in past.  Alcohol use- none. Past medical history is significant for recent diagnosis of cancer of the right lung as above, COPD, bronchitis, TOSHA/BSO for endometriosis. There is also concern with possible right breast swelling and malignancy. She has had a TOSHA/BSO for endometriosis. PMH:  Past Medical History:   Diagnosis Date    Abnormal CXR     Arthritis     Bronchitis     Cancer ([address]     RT lung    Chronic obstructive pulmonary disease ([address]     COVID-19     Menopause         PSH:  Past Surgical History:   Procedure Laterality Date    HX HYSTERECTOMY  1987    TOSHA due to endometrosis, at age 28    HX OOPHORECTOMY          Allergies: Allergies   Allergen Reactions    Iodine And Iodide Containing Products Hives    Olanzapine Other (comments)        Home Medications:  Cannot display prior to admission medications because the patient has not been admitted in this contact. Hospital Medications:  Current Outpatient Medications   Medication Sig    loratadine (CLARITIN) 10 mg tablet Take 10 mg by mouth daily. furosemide (LASIX) 20 mg tablet Take 20 mg by mouth as needed. famotidine (PEPCID) 20 mg tablet     benzonatate (TESSALON) 100 mg capsule Take 1 Capsule by mouth. ondansetron hcl (ZOFRAN) 8 mg tablet Take 8 mg by mouth every eight (8) hours as needed for Nausea or Nausea or Vomiting. ibuprofen (MOTRIN) 200 mg tablet Take 800 mg by mouth as needed for Pain. Taken with food    budesonide-formoteroL (Symbicort) 160-4.5 mcg/actuation HFAA Take 1 Puff by inhalation two (2) times a day. triamcinolone acetonide (KENALOG) 0.1 % topical cream Apply  to affected area four (4) times daily as needed. albuterol (PROVENTIL HFA, VENTOLIN HFA, PROAIR HFA) 90 mcg/actuation inhaler Take 2 Puffs by inhalation every six (6) hours as needed for Wheezing.     predniSONE (DELTASONE) 50 mg tablet Take 50 mg by mouth as needed. acetaminophen (TYLENOL) 500 mg tablet Take 1,000 mg by mouth every six (6) hours as needed for Pain. (Patient not taking: Reported on 10/6/2022)    cyclobenzaprine (FLEXERIL) 10 mg tablet Take 1 Tablet by mouth three (3) times daily as needed for Muscle Spasm(s). (Patient not taking: Reported on 10/6/2022)    methocarbamoL (ROBAXIN) 500 mg tablet Take 1 Tablet by mouth four (4) times daily. (Patient not taking: Reported on 10/6/2022)     No current facility-administered medications for this visit. Social History:  Social History     Tobacco Use    Smoking status: Every Day     Packs/day: 1.00     Years: 40.00     Pack years: 40.00     Types: Cigarettes    Smokeless tobacco: Never   Substance Use Topics    Alcohol use: Yes     Comment: seldom        Pt denies any history of IV drug use, blood transfusions. Family History:  Family History   Problem Relation Age of Onset    Heart Disease Mother     Diabetes Father     Cancer Father         Merkel cell in his hand to lymph nodes    COPD Brother     Emphysema Brother     Uterine Cancer Maternal Aunt     Colon Cancer Maternal Uncle     Cancer Paternal Aunt         brain    Cancer Paternal Uncle         Brain tumor        Review of Systems:  A detailed 10 system ROS is obtained, with pertinent positives as listed above. All others are negative unless listed in history above. Constitutional denies fever chills, headache, or weight loss. Skin- denies lesions or rashes. HEENT- denies any vision or hearing problems, epistaxis, sore throat, or dental problems. Lungs- no shortness of breath or chest pain reported, no dyspnea on exertion. Cardiac- no palpitations or chest pain reported, including at rest or on exertion. GI-no abdominal pain, melena, rectal bleeding, reflux, dysphagia, jaundice, change in stool or urine color, constipation or diarrhea. Genitourinary -no dysuria or hematuria. Musculoskeletal-no muscle weakness or disuse or atrophy. Neurologic-no numbness, tingling, gait disturbance, or other abnormalities. Rheumatologic- patient denies any immune or rheumatologic diseases or symptoms. Endocrine- patient denies any endocrine abnormalities including thyroid disease or diabetes. Psychologic-patient denies depression, anxiety or emotional issues. No reported memory issues. Objective:     Physical Exam:  Vitals: Ht 5' 6\" (1.676 m)   Wt 74.1 kg (163 lb 6.4 oz)   BMI 26.37 kg/m²    Gen:  Pt is alert, cooperative, no acute distress. He is very frail appearing and chronically ill. Skin:  Extremities and face reveal no rashes. No puri erythema. No telangiectasias on the chest wall. HEENT: Sclerae anicteric. Extra-occular muscles are intact. No oral ulcers. No abnormal pigmentation of the lips. The neck is supple. Cardiovascular: Regular rate and rhythm. No murmurs, gallops, or rubs. Respiratory:  Comfortable breathing with no accessory muscle use. Decreased breath sounds anteriorly with no wheezes, rales, or rhonchi. GI:  Abdomen nondistended, soft, and nontender. Normal active bowel sounds. No enlargement of the liver or spleen. No masses palpable. Rectal:  Deferred  Musculoskeletal:   No costovertebral tenderness. No localized muscle weakness, or decreased range of motion. Extremities:  No palpable cords or pitting edema of the lower legs. Extremities have good range of motion. She does have some mild edema subjectively on the right side of her body. Neurological:  Gross memory appears intact. Patient is alert and oriented. Psychiatric:  Mood appears appropriate with judgement intact. Lymphatic:  No cervical or supraclavicular adenopathy.     Laboratory:        Lab Results   Component Value Date    LPSE 17 09/27/2022     09/27/2022    K 3.6 09/27/2022    CL 95 (L) 09/27/2022    CO2 27 09/27/2022    AGAP 13 08/11/2021     (H) 09/27/2022    BUN 7 (L) 09/27/2022    CREA 0.49 (L) 09/27/2022    BUCR 14 09/27/2022    GFRAA >60 08/11/2021    GFRNA >60 08/11/2021    CA 10.0 09/27/2022    TBILI 0.3 09/27/2022    AST 13 09/27/2022    ALT 9 09/27/2022    AP 86 09/27/2022    TP 6.3 09/27/2022    ALB 3.9 09/27/2022    GLOB 3.2 08/11/2021    AGRAT 1.6 09/27/2022    WBC 7.3 09/27/2022    RBC 3.96 09/27/2022    HGB 11.3 09/27/2022    HCT 33.4 (L) 09/27/2022    MCV 84 09/27/2022    MCH 28.5 09/27/2022    MCHC 33.8 09/27/2022    RDW 13.7 09/27/2022     09/27/2022    MPLV 11.1 08/11/2021    GRANS 76 09/27/2022    LYMPH 35 08/11/2021    MONOS 7 09/27/2022    EOS 1 09/27/2022    BASOS 0 09/27/2022    IG 0 09/27/2022    ANEU 5.5 09/27/2022    ABL 1.8 08/11/2021    ABM 0.5 09/27/2022    BRYAN 0.1 09/27/2022    ABB 0.0 09/27/2022    AIG 0.0 09/27/2022   results  Lab Results   Component Value Date    LPSE 17 09/27/2022     09/27/2022    K 3.6 09/27/2022    CL 95 (L) 09/27/2022    CO2 27 09/27/2022    AGAP 13 08/11/2021     (H) 09/27/2022    BUN 7 (L) 09/27/2022    CREA 0.49 (L) 09/27/2022    BUCR 14 09/27/2022    GFRAA >60 08/11/2021    GFRNA >60 08/11/2021    CA 10.0 09/27/2022    TBILI 0.3 09/27/2022    AST 13 09/27/2022    ALT 9 09/27/2022    AP 86 09/27/2022    TP 6.3 09/27/2022    ALB 3.9 09/27/2022    GLOB 3.2 08/11/2021    AGRAT 1.6 09/27/2022    WBC 7.3 09/27/2022    RBC 3.96 09/27/2022    HGB 11.3 09/27/2022    HCT 33.4 (L) 09/27/2022    MCV 84 09/27/2022    MCH 28.5 09/27/2022    MCHC 33.8 09/27/2022    RDW 13.7 09/27/2022     09/27/2022    MPLV 11.1 08/11/2021    GRANS 76 09/27/2022    LYMPH 35 08/11/2021    MONOS 7 09/27/2022    EOS 1 09/27/2022    BASOS 0 09/27/2022    IG 0 09/27/2022    ANEU 5.5 09/27/2022    ABL 1.8 08/11/2021    ABM 0.5 09/27/2022    BRYAN 0.1 09/27/2022    ABB 0.0 09/27/2022    AIG 0.0 09/27/2022        CT scan of abdomen and pelvis - CT Results (most recent):  Results from [place] encounter on 03/16/22    CT CHEST W CONT    Narrative  EXAM: CT CHEST W CONT    CLINICAL INDICATION/HISTORY: Follow-up following chemotherapy.  > Additional: None. COMPARISON: None. TECHNIQUE: Axial CT imaging from the thoracic inlet through the diaphragm with  intravenous contrast. Multiplanar reformats were generated. One or more dose  reduction techniques were used on this CT: automated exposure control,  adjustment of the mAs and/or kVp according to patient size, and iterative  reconstruction techniques. The specific techniques used on this CT exam have  been documented in the patient's electronic medical record. Digital Imaging and  Communications in Medicine (DICOM) format image data are available to  nonaffiliated external healthcare facilities or entities on a secure, media  free, reciprocally searchable basis with patient authorization for at least a  12-month period after this study. _______________    FINDINGS: Left IJ approach Mediport is in place with the catheter tubing  terminating in the SVC. LUNGS AND PLEURA: Irregular shaped infiltrative right suprahilar mass abutting  the mediastinum demonstrates interval decrease in size compared to the prior  examination estimated approximately 2.6 x 3.7 cm, previously 3.7 x 4.1 cm (image  27). There is slight mass effect along the right posterior lateral wall of the  adjacent SVC which is improved since the prior exam    There is opacity with volume loss in the anterior medial right upper lobe  adjacent to the mass. AIRWAYS: Questionable minimal amount of debris dependently within the lower  trachea. Otherwise unremarkable. MEDIASTINUM: Heart size is normal. No pericardial effusion. Scattered calcified  mediastinal lymph nodes are present most of the left. No enlarged mediastinal  lymph nodes. CHEST WALL: No suspicious osseous lesions. UPPER ABDOMEN: Calcified splenic granulomata as well as a punctate calcified  liver granuloma.   No suspicious lesions. _______________    Impression  Slight interval decrease in size of the known right suprahilar mass with  mediastinal invasion. Slightly mass effect on the adjacent SVC. Persistent  postobstructive pneumonitis in the anterior medial right upper lobe. Additional chronic ancillary findings as above. **   *    *         Abdominal US- No results  US Results (most recent):  Results from Hospital Encounter encounter on 06/14/22    US BREAST RT LIMITED=<3 QUAD    Narrative  EXAM: Bilateral digital screening mammogram, with CAD, including 3-D breast  tomosynthesis. INDICATION: 67-year-old female presents with right breast pain and breast  swelling. Personal history of right upper lobe lung cancer status post  radiation treatment and chemotherapy. COMPARISON: .2021. TECHNIQUE: Images were obtained with 3-D breast tomosynthesis and C-view  reconstruction of 2-D images. The 2-D images were viewed with computer aided  detection as an adjunct. Targeted real-time ultrasound examination of the  breast was performed by myself and the technologist.    _______________    FINDINGS:    The breast is heterogeneously dense, which may obscure small masses. There are  no suspicious masses, areas of architectural distortion, or microcalcifications  seen. Diffuse trabecular and skin thickening. Right breast ultrasound: Ultrasound was performed at the 9 o'clock position. There are no cystic or solid masses seen. There is diffuse skin thickening and edematous changes seen throughout the  breast.    Targeted physical examination of the right demonstrates soft nodularity without  discrete mass or overlying skin changes. Patient is warm to palpation. _______________    Impression  1. Interval development of diffuse trabecular and skin thickening without  discrete mass on mammogram or ultrasound. Differential would include  postirradiation state, mastitis, and inflammatory breast cancer.  Findings are  likely due to recent radiation treatment. Recommend short-term follow-up  mammogram (3 months). A result letter will be sent to the patient. Findings were also discussed with  the patient. Assessment Category 3: Probably benign. MRI and MRCP-  MRI Results (most recent):  [facility]18[facility]21    MRI BRAIN W WO CONT    Narrative  EXAM: MRI brain without and with contrast 10/18/2021. CLINICAL INDICATION/HISTORY: Right-sided lung cancer. Staging evaluation. COMPARISON: None. TECHNIQUE: Multiplanar multisequential images of the brain were obtained before  and after the administration of  20cc of IV ProHance.  _______________    FINDINGS:    BRAIN AND POSTERIOR FOSSA: Mild atrophy and chronic small vessel changes are  present in the periventricular and subcortical white matter of both cerebral  hemispheres. The cerebellar tonsils are low-lying but do not meet criteria for  Chiari I malformation. Small foci of abnormal signal within the julien and left  corona radiata white matter may represent prominent perivascular spaces, chronic  small vessel change, and/or areas of old lacunar infarction. There is no intracranial hemorrhage, mass effect, or midline shift. There are  no significant additional areas of abnormal parenchymal signal. There are no  areas of restricted diffusion to suggest acute infarct. There is no abnormal  cerebral or cerebellar enhancement. EXTRA-AXIAL SPACES AND MENINGES: There are no abnormal extra-axial fluid  collections. .There is no abnormal meningeal enhancement. VASCULAR: The visualized portions of the intracranial carotid and  vertebrobasilar systems demonstrate patent appearing flow voids. CALVARIA: Unremarkable. SINUSES: Retention cysts are noted in both maxillary sinus antra. The  visualized portions of the paranasal sinuses are otherwise clear. CRANIOCERVICAL JUNCTION: Within normal limits for age.     OTHER: There is a cystic area in the nasopharynx which extends to the right of  midline, likely representing an incidental Tornwaldt cyst or a nasopharyngeal  retention cyst.  _______________    Impression  1. Mild atrophy and chronic small vessel changes with possible foci of old  lacunar infarction in the julien and left corona radiata. 2.  Otherwise, unremarkable evaluation. Specifically, no acute intracranial  abnormalities are identified and there is no abnormal enhancement to suggest  intracranial metastases. Assessment:     Right sided lung cancer. This is a cannot diagnosis and she has been evaluated by pulmonology including a PET scan which showed no spread outside the lung. She is developed superior vena cava syndrome with stent placement and is now having problems with sequelae related to radiation and malignancy. She is also undergoing planned treatment by both oncology and radiation oncology. COPD. This is not surprising especially given her long-time tobacco use. History of bronchitis. This is likely related to her tobacco use as above. Nausea and vomiting. I am suspicious this is not at all related to her digestive system but more likely related to the fact she has had radiation therapy, has some progressive malignancy with superior vena cava syndrome causing some external compression or involvement on the nerves that affect the esophagus and throat. The edema she complains of also is likely contributing as well. This likely could also be due to coughing to the point will cause unfortunately vomiting. It is also possible she has invasion of lung cancer into her esophagus with some external compression. This may not be fixable. Dysphagia. She does not appear to have true dysphagia but is possible. At risk for esophageal malignancy. Could also have oropharyngeal dysphagia due to nerve involvement or paraneoplastic syndrome from her lung cancer. Colon cancer screening.   She is now due for screening colonoscopy. Given her ongoing problems related to her lung cancer, I feel this should be deferred at this time. Plan:     EGD with dilatation with MAC. I discussed the techniques involved with the procedure as well as the risks, benefits, and alternatives including but not limited to bleeding, infection, perforation requiring emergent surgery, missing lesions, death, and anesthesia related complications with the patient. All questions and concerns were answered. The patient voiced understanding and agrees to proceed. Modified barium swallow to evaluate for oropharyngeal dysphagia.`  Colonoscopy with MAC in the future if/when she improves from her lung cancer. If the above evaluation is unrevealing for any significant GI tract pathology, then I suspect the patient's symptoms are not due to any GI etiology. The symptoms may be due to sequelae related to her lung cancer, superior vena cava syndrome, and radiation therapy. Further recommendations pending the patient's clinical course, if needed. The patient will follow up with me as needed.     Phyllis Chavez MD

## 2022-10-06 NOTE — PROGRESS NOTES
Nikos Beard presents today for an evaluation of nausea and vomitting. Patient states that she has been experiencing the nausea and vomiting for the last few months but has gotten worse in the last few weeks. Chief Complaint   Patient presents with    New Patient     Nausea and vomitting       Is someone accompanying this pt? No    Is the patient using any DME equipment during OV? Yes, Walker    Depression Screening:  3 most recent PHQ Screens 10/6/2022   Little interest or pleasure in doing things Not at all   Feeling down, depressed, irritable, or hopeless Not at all   Total Score PHQ 2 0       Learning Assessment:  Learning Assessment 9/10/2021   PRIMARY LEARNER Patient   PRIMARY LANGUAGE ENGLISH   LEARNER PREFERENCE PRIMARY DEMONSTRATION   ANSWERED BY Patient   RELATIONSHIP SELF       Abuse Screening:  Abuse Screening Questionnaire 9/19/2022   Do you ever feel afraid of your partner? N   Are you in a relationship with someone who physically or mentally threatens you? N   Is it safe for you to go home? Y       Fall Risk  Fall Risk Assessment, last 12 mths 9/19/2022   Able to walk? Yes   Fall in past 12 months? 1   Do you feel unsteady? 1   Are you worried about falling 1   Is TUG test greater than 12 seconds? 0   Is the gait abnormal? 0   Number of falls in past 12 months 2   Fall with injury?  0       ADL  ADL Assessment 9/19/2022   Feeding yourself No Help Needed   Getting from bed to chair No Help Needed   Getting dressed No Help Needed   Bathing or showering No Help Needed   Walk across the room (includes cane/walker) No Help Needed   Using the telphone No Help Needed   Taking your medications No Help Needed   Preparing meals No Help Needed   Managing money (expenses/bills) No Help Needed   Moderately strenuous housework (laundry) No Help Needed   Shopping for personal items (toiletries/medicines) No Help Needed   Shopping for groceries Help Needed   Driving Help Needed   Climbing a flight of stairs No Help Needed   Getting to places beyond walking distances No Help Needed       Health Maintenance reviewed and discussed and ordered per Provider. Health Maintenance Due   Topic Date Due    Hepatitis C Screening  Never done    Pneumococcal 65+ years (1 - PCV) Never done    DTaP/Tdap/Td series (1 - Tdap) Never done    Lipid Screen  Never done    Colorectal Cancer Screening Combo  Never done    Shingrix Vaccine Age 50> (1 of 2) Never done    Low dose CT lung screening  Never done    Bone Densitometry (Dexa) Screening  Never done    Flu Vaccine (1) 08/01/2022    COVID-19 Vaccine (4 - Booster for [employer] series) 08/10/2022    Medicare Yearly Exam  09/21/2022   . Coordination of Care:  1. Have you been to the ER, urgent care clinic since your last visit? Hospitalized since your last visit? No    2. Have you seen or consulted any other health care providers outside of the [address] [place] [address] since your last visit? Include any pap smears or colon screening.  No

## 2022-10-06 NOTE — LETTER
10/6/2022    Patient: Sugar Hdz   YOB: 1955   Date of Visit: 10/6/2022     Enid Howard MD  [address]  [address] [address]  [place] [address]    Dear Enid Howard MD,      Thank you for referring Ms. Kaylah Irby to [address] [place] [address] for evaluation. My notes for this consultation are attached. If you have questions, please do not hesitate to call me. I look forward to following your patient along with you.       Sincerely,    Hilaria Miller MD

## 2022-10-12 ENCOUNTER — ANESTHESIA EVENT (OUTPATIENT)
Dept: ENDOSCOPY | Age: 67
End: 2022-10-12
Payer: MEDICARE

## 2022-10-12 ENCOUNTER — ANESTHESIA (OUTPATIENT)
Dept: ENDOSCOPY | Age: 67
End: 2022-10-12
Payer: MEDICARE

## 2022-10-12 ENCOUNTER — HOSPITAL ENCOUNTER (OUTPATIENT)
Age: 67
Setting detail: OUTPATIENT SURGERY
Discharge: HOME | End: 2022-10-12
Attending: INTERNAL MEDICINE | Admitting: INTERNAL MEDICINE
Payer: MEDICARE

## 2022-10-12 VITALS
RESPIRATION RATE: 16 BRPM | DIASTOLIC BLOOD PRESSURE: 72 MMHG | SYSTOLIC BLOOD PRESSURE: 116 MMHG | TEMPERATURE: 97.3 F | OXYGEN SATURATION: 96 % | HEART RATE: 87 BPM

## 2022-10-12 PROCEDURE — 2709999900 HC NON-CHARGEABLE SUPPLY: Performed by: INTERNAL MEDICINE

## 2022-10-12 PROCEDURE — 43235 EGD DIAGNOSTIC BRUSH WASH: CPT | Performed by: INTERNAL MEDICINE

## 2022-10-12 PROCEDURE — 74011250636 HC RX REV CODE- 250/636: Performed by: NURSE ANESTHETIST, CERTIFIED REGISTERED

## 2022-10-12 PROCEDURE — 76040000019: Performed by: INTERNAL MEDICINE

## 2022-10-12 PROCEDURE — 76060000031 HC ANESTHESIA FIRST 0.5 HR: Performed by: INTERNAL MEDICINE

## 2022-10-12 PROCEDURE — 77030018831 HC SOL IRR H20 BAXT -A: Performed by: INTERNAL MEDICINE

## 2022-10-12 PROCEDURE — 77030042138 HC TBNG SPECIAL -A: Performed by: INTERNAL MEDICINE

## 2022-10-12 PROCEDURE — 77030037186 HC VLV ENDOSC STRL DEFENDO DISP MVAT -A: Performed by: INTERNAL MEDICINE

## 2022-10-12 PROCEDURE — 74011250636 HC RX REV CODE- 250/636: Performed by: INTERNAL MEDICINE

## 2022-10-12 RX ORDER — ONDANSETRON 2 MG/ML
4 INJECTION INTRAMUSCULAR; INTRAVENOUS ONCE
Status: DISCONTINUED | OUTPATIENT
Start: 2022-10-12 | End: 2022-10-12 | Stop reason: HOSPADM

## 2022-10-12 RX ORDER — SODIUM CHLORIDE 0.9 % (FLUSH) 0.9 %
5-40 SYRINGE (ML) INJECTION AS NEEDED
Status: CANCELLED | OUTPATIENT
Start: 2022-10-12

## 2022-10-12 RX ORDER — PROPOFOL 10 MG/ML
INJECTION, EMULSION INTRAVENOUS AS NEEDED
Status: DISCONTINUED | OUTPATIENT
Start: 2022-10-12 | End: 2022-10-12 | Stop reason: HOSPADM

## 2022-10-12 RX ORDER — SODIUM CHLORIDE 0.9 % (FLUSH) 0.9 %
5-40 SYRINGE (ML) INJECTION EVERY 8 HOURS
Status: DISCONTINUED | OUTPATIENT
Start: 2022-10-12 | End: 2022-10-12 | Stop reason: HOSPADM

## 2022-10-12 RX ORDER — SODIUM CHLORIDE, SODIUM LACTATE, POTASSIUM CHLORIDE, CALCIUM CHLORIDE 600; 310; 30; 20 MG/100ML; MG/100ML; MG/100ML; MG/100ML
75 INJECTION, SOLUTION INTRAVENOUS CONTINUOUS
Status: DISCONTINUED | OUTPATIENT
Start: 2022-10-12 | End: 2022-10-12 | Stop reason: HOSPADM

## 2022-10-12 RX ORDER — SODIUM CHLORIDE, SODIUM LACTATE, POTASSIUM CHLORIDE, CALCIUM CHLORIDE 600; 310; 30; 20 MG/100ML; MG/100ML; MG/100ML; MG/100ML
INJECTION, SOLUTION INTRAVENOUS
Status: DISCONTINUED | OUTPATIENT
Start: 2022-10-12 | End: 2022-10-12 | Stop reason: HOSPADM

## 2022-10-12 RX ADMIN — SODIUM CHLORIDE, POTASSIUM CHLORIDE, SODIUM LACTATE AND CALCIUM CHLORIDE 75 ML/HR: 600; 310; 30; 20 INJECTION, SOLUTION INTRAVENOUS at 14:06

## 2022-10-12 RX ADMIN — SODIUM CHLORIDE, POTASSIUM CHLORIDE, SODIUM LACTATE AND CALCIUM CHLORIDE: 600; 310; 30; 20 INJECTION, SOLUTION INTRAVENOUS at 14:40

## 2022-10-12 RX ADMIN — PROPOFOL 50 MG: 10 INJECTION, EMULSION INTRAVENOUS at 14:52

## 2022-10-12 RX ADMIN — PROPOFOL 100 MG: 10 INJECTION, EMULSION INTRAVENOUS at 14:42

## 2022-10-12 NOTE — INTERVAL H&P NOTE
Update History & Physical    The Patient's History and Physical of October 11, 2022  The procedure was reviewed with the patient and I examined the patient. There was no change. The surgical site was confirmed by the patient and me. Plan:  The risk, benefits, expected outcome, and alternative to the recommended procedure have been discussed with the patient. Patient understands and wants to proceed with the procedure.     Electronically signed by Katlin Quiles MD on 10/12/2022 at 1:51 PM

## 2022-10-12 NOTE — ANESTHESIA PREPROCEDURE EVALUATION
Relevant Problems   RESPIRATORY SYSTEM   (+) Chronic obstructive pulmonary disease (HCC)      GASTROINTESTINAL   (+) Acid reflux      PERSONAL HX & FAMILY HX OF CANCER   (+) Non-small cell lung cancer, right (HCC)       Anesthetic History   No history of anesthetic complications            Review of Systems / Medical History  Patient summary reviewed, nursing notes reviewed and pertinent labs reviewed    Pulmonary  Within defined limits  COPD               Neuro/Psych   Within defined limits           Cardiovascular  Within defined limits                Exercise tolerance: >4 METS     GI/Hepatic/Renal  Within defined limits              Endo/Other  Within defined limits      Arthritis     Other Findings              Physical Exam    Airway  Mallampati: II  TM Distance: 4 - 6 cm  Neck ROM: normal range of motion   Mouth opening: Normal     Cardiovascular  Regular rate and rhythm,  S1 and S2 normal,  no murmur, click, rub, or gallop  Rhythm: regular  Rate: normal         Dental  No notable dental hx       Pulmonary  Breath sounds clear to auscultation               Abdominal  GI exam deferred       Other Findings            Anesthetic Plan    ASA: 2  Anesthesia type: total IV anesthesia          Induction: Intravenous  Anesthetic plan and risks discussed with: Patient

## 2022-10-12 NOTE — PROCEDURES
EGD procedure note    Date of procedure: 10/12/22     Indication for procedure: Dysphagia, nausea and vomiting    Type of procedure: Upper endoscopy     Anesthesia classification: ASA class 3    Patient history and physical has been accomplished and documented. Patient is assessed and determined to be an appropriate candidate for planned procedure and sedation. The patient was assessed immediately prior to sedation. Sedation plan: MAC per anesthesia. Surgical assistant: Not applicable    Airway assessment: Range of motion: normal, mouth opening: Normal, visual obstruction: No.    PREOP EXAM:  Unchanged. VS: Reviewed  Gen: WDWN in NAD  CV: RRR, no murmur  Resp: CTAB  Abd: Soft, NTND, +BS  Extrem: No cyanosis or edema  Neuro: Awake and alert      Informed consent obtained: Yes. The indications, risks, including but not limited to bleeding, perforation, infection, death, potential for failure to visualize or diagnose neoplasia, alternatives, benefits, discussed in detail with the patient prior to the procedure. Patient understands and agrees to the procedure. Patient identity and procedure were verified, consent was obtained, and is consistent with the consent form in the patient's records. INSTRUMENT: Olympus upper endoscope    PROCEDURE FINDINGS:    OROPHARYNX: Normal    ESOPHAGUS:  Esophageal mucosa normal with no masses noted in the proximal, mid, and distal esophagus. There appeared to be 4 channel varices extending from the upper esophagus at approximately 22 cm down to the Z-line at 35 cm. None of these had stigmata of hemorrhage. They were easily flattened with insufflation of air. The Z-line was at 35 cm. and was normal.   Stricture, tumor, or Schatzki's ring was seen. A 4 cm hiatal hernia was noted distally. STOMACH: Stomach was then evaluated including the cardia and fundus on retroflexion view.   Evaluation of the gastric body, antrum, and pylorus were normal, including normal gastric mucosa with no masses, ulcers, or mucosal abnormalities. Retroflexion view of the cardia and fundus were normal.     DUODENUM:  The duodenal bulb and descending duodenum were then evaluated and found to be normal including no masses, ulcers, or or mucosal abnormalities. SPECIMENS: None    ESTIMATED BLOOD LOSS:  None. COMPLICATIONS:  None     COMMENTS: I suspect esophageal varices are secondary to restrict. Her vena cava syndrome resulting in more distal clot and portal hypertension and resultant collateral variceal formation in the esophagus. Impression:  1. No lesion seen to dilate in her esophagus that would cause dysphagia. 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 2.  4 cm hiatal hernia. 3.  Esophageal varices with no stigmata of hemorrhage. As above, I suspect that these are secondary to her superior vena cava syndrome and collateral variceal formation. Recommendations:    1. Continue present PPI therapy. 2.  Further recommendations pending clinical course as needed. 3.  Have the modified barium swallow done to check for oropharyngeal dysphagia as is ordered. 4.  Follow up as needed.        Lina Bo MD

## 2022-10-12 NOTE — ANESTHESIA POSTPROCEDURE EVALUATION
Procedure(s):  EGD WITH DILATION (URGENT). total IV anesthesia    Anesthesia Post Evaluation      Multimodal analgesia: multimodal analgesia used between 6 hours prior to anesthesia start to PACU discharge  Patient location during evaluation: bedside  Patient participation: complete - patient participated  Level of consciousness: awake and alert  Pain score: 0  Pain management: adequate  Airway patency: patent  Anesthetic complications: no  Cardiovascular status: acceptable and stable  Respiratory status: acceptable and room air  Hydration status: acceptable  Comments: Ok to discharge when post op criteria met. Post anesthesia nausea and vomiting:  none  Final Post Anesthesia Temperature Assessment:  Normothermia (36.0-37.5 degrees C)      INITIAL Post-op Vital signs: No vitals data found for the desired time range.

## 2022-10-18 ENCOUNTER — HOSPITAL ENCOUNTER (OUTPATIENT)
Dept: NON INVASIVE DIAGNOSTICS | Age: 67
Discharge: HOME | End: 2022-10-18
Attending: STUDENT IN AN ORGANIZED HEALTH CARE EDUCATION/TRAINING PROGRAM
Payer: MEDICARE

## 2022-10-18 VITALS
BODY MASS INDEX: 26.2 KG/M2 | HEIGHT: 66 IN | SYSTOLIC BLOOD PRESSURE: 90 MMHG | DIASTOLIC BLOOD PRESSURE: 50 MMHG | WEIGHT: 163 LBS

## 2022-10-18 DIAGNOSIS — R60.9 SWELLING: ICD-10-CM

## 2022-10-18 DIAGNOSIS — R06.02 SHORTNESS OF BREATH: ICD-10-CM

## 2022-10-18 LAB
ECHO AO ROOT DIAM: 2.9 CM
ECHO AO ROOT INDEX: 1.58 CM/M2
ECHO AV AREA PEAK VELOCITY: 2.7 CM2
ECHO AV AREA VTI: 2.6 CM2
ECHO AV AREA/BSA PEAK VELOCITY: 1.5 CM2/M2
ECHO AV AREA/BSA VTI: 1.4 CM2/M2
ECHO AV MEAN GRADIENT: 4 MMHG
ECHO AV MEAN VELOCITY: 1 M/S
ECHO AV PEAK GRADIENT: 8 MMHG
ECHO AV PEAK VELOCITY: 1.4 M/S
ECHO AV VELOCITY RATIO: 0.79
ECHO AV VTI: 26.4 CM
ECHO EST RA PRESSURE: 3 MMHG
ECHO LA DIAMETER INDEX: 1.8 CM/M2
ECHO LA DIAMETER: 3.3 CM
ECHO LA TO AORTIC ROOT RATIO: 1.14
ECHO LA VOL 2C: 30 ML (ref 22–52)
ECHO LA VOL 4C: 41 ML (ref 22–52)
ECHO LA VOL BP: 37 ML (ref 22–52)
ECHO LA VOL/BSA BIPLANE: 20 ML/M2 (ref 16–34)
ECHO LA VOLUME AREA LENGTH: 40 ML
ECHO LA VOLUME INDEX A2C: 16 ML/M2 (ref 16–34)
ECHO LA VOLUME INDEX A4C: 22 ML/M2 (ref 16–34)
ECHO LA VOLUME INDEX AREA LENGTH: 22 ML/M2 (ref 16–34)
ECHO LV E' LATERAL VELOCITY: 9 CM/S
ECHO LV E' SEPTAL VELOCITY: 7 CM/S
ECHO LV EDV A2C: 48 ML
ECHO LV EDV A4C: 39 ML
ECHO LV EDV BP: 46 ML (ref 56–104)
ECHO LV EDV INDEX A4C: 21 ML/M2
ECHO LV EDV INDEX BP: 25 ML/M2
ECHO LV EDV NDEX A2C: 26 ML/M2
ECHO LV EJECTION FRACTION A2C: 61 %
ECHO LV EJECTION FRACTION A4C: 65 %
ECHO LV EJECTION FRACTION BIPLANE: 64 % (ref 55–100)
ECHO LV ESV A2C: 19 ML
ECHO LV ESV A4C: 14 ML
ECHO LV ESV BP: 16 ML (ref 19–49)
ECHO LV ESV INDEX A2C: 10 ML/M2
ECHO LV ESV INDEX A4C: 8 ML/M2
ECHO LV ESV INDEX BP: 9 ML/M2
ECHO LV FRACTIONAL SHORTENING: 33 % (ref 28–44)
ECHO LV GLOBAL LONGITUDINAL STRAIN (GLS): -15 %
ECHO LV INTERNAL DIMENSION DIASTOLE INDEX: 2.19 CM/M2
ECHO LV INTERNAL DIMENSION DIASTOLIC: 4 CM (ref 3.9–5.3)
ECHO LV INTERNAL DIMENSION SYSTOLIC INDEX: 1.48 CM/M2
ECHO LV INTERNAL DIMENSION SYSTOLIC: 2.7 CM
ECHO LV IVSD: 1 CM (ref 0.6–0.9)
ECHO LV MASS 2D: 136.2 G (ref 67–162)
ECHO LV MASS INDEX 2D: 74.4 G/M2 (ref 43–95)
ECHO LV POSTERIOR WALL DIASTOLIC: 1.1 CM (ref 0.6–0.9)
ECHO LV RELATIVE WALL THICKNESS RATIO: 0.55
ECHO LVOT AREA: 3.5 CM2
ECHO LVOT AV VTI INDEX: 0.73
ECHO LVOT DIAM: 2.1 CM
ECHO LVOT MEAN GRADIENT: 2 MMHG
ECHO LVOT PEAK GRADIENT: 4 MMHG
ECHO LVOT PEAK VELOCITY: 1.1 M/S
ECHO LVOT STROKE VOLUME INDEX: 36.5 ML/M2
ECHO LVOT SV: 66.8 ML
ECHO LVOT VTI: 19.3 CM
ECHO MV A VELOCITY: 0.66 M/S
ECHO MV AREA VTI: 3 CM2
ECHO MV E DECELERATION TIME (DT): 180.9 MS
ECHO MV E VELOCITY: 0.66 M/S
ECHO MV E/A RATIO: 1
ECHO MV E/E' LATERAL: 7.33
ECHO MV E/E' RATIO (AVERAGED): 8.38
ECHO MV E/E' SEPTAL: 9.43
ECHO MV LVOT VTI INDEX: 1.17
ECHO MV MAX VELOCITY: 0.9 M/S
ECHO MV MEAN GRADIENT: 1 MMHG
ECHO MV MEAN VELOCITY: 0.6 M/S
ECHO MV PEAK GRADIENT: 3 MMHG
ECHO MV VTI: 22.6 CM
ECHO PERICARDIUM ANTERIOR DIMENSION: 0.9 CM
ECHO PERICARDIUM POSTERIOR DIMENSION: 0.9 CM
ECHO PV MAX VELOCITY: 1 M/S
ECHO PV PEAK GRADIENT: 4 MMHG
ECHO RIGHT VENTRICULAR SYSTOLIC PRESSURE (RVSP): 32 MMHG
ECHO RV INTERNAL DIMENSION: 2.7 CM
ECHO RV TAPSE: 1.8 CM (ref 1.7–?)
ECHO TV REGURGITANT MAX VELOCITY: 2.68 M/S
ECHO TV REGURGITANT PEAK GRADIENT: 29 MMHG

## 2022-10-18 PROCEDURE — 93306 TTE W/DOPPLER COMPLETE: CPT

## 2022-10-18 NOTE — PROGRESS NOTES
Reviewed echo. Letter sent to patient. Overall looks fairly normal. Will continue to monitor the small pericardial effusion.       ALCON Mike

## 2022-10-21 ENCOUNTER — OFFICE VISIT (OUTPATIENT)
Dept: FAMILY MEDICINE CLINIC | Age: 67
End: 2022-10-21
Payer: MEDICARE

## 2022-10-21 ENCOUNTER — HOSPITAL ENCOUNTER (EMERGENCY)
Age: 67
Discharge: HOME | End: 2022-10-22
Attending: EMERGENCY MEDICINE | Admitting: EMERGENCY MEDICINE
Payer: MEDICARE

## 2022-10-21 VITALS
WEIGHT: 163.2 LBS | OXYGEN SATURATION: 95 % | DIASTOLIC BLOOD PRESSURE: 65 MMHG | SYSTOLIC BLOOD PRESSURE: 90 MMHG | HEIGHT: 66 IN | HEART RATE: 100 BPM | BODY MASS INDEX: 26.23 KG/M2 | TEMPERATURE: 97.6 F

## 2022-10-21 DIAGNOSIS — C34.91 NON-SMALL CELL LUNG CANCER, RIGHT (HCC): Primary | ICD-10-CM

## 2022-10-21 DIAGNOSIS — E86.1 HYPOTENSION DUE TO HYPOVOLEMIA: ICD-10-CM

## 2022-10-21 DIAGNOSIS — R53.83 FATIGUE, UNSPECIFIED TYPE: Primary | ICD-10-CM

## 2022-10-21 DIAGNOSIS — R53.1 WEAKNESS GENERALIZED: ICD-10-CM

## 2022-10-21 DIAGNOSIS — E86.0 DEHYDRATION: ICD-10-CM

## 2022-10-21 DIAGNOSIS — I95.89 HYPOTENSION DUE TO HYPOVOLEMIA: ICD-10-CM

## 2022-10-21 DIAGNOSIS — R11.2 NAUSEA AND VOMITING, UNSPECIFIED VOMITING TYPE: ICD-10-CM

## 2022-10-21 PROBLEM — R22.0 SWELLING OF FACE: Status: ACTIVE | Noted: 2022-10-21

## 2022-10-21 PROBLEM — G89.3 PAIN DUE TO NEOPLASM: Status: ACTIVE | Noted: 2022-01-01

## 2022-10-21 PROBLEM — R60.0 EDEMA OF UPPER EXTREMITY: Status: ACTIVE | Noted: 2022-10-21

## 2022-10-21 PROBLEM — C34.90 NON-SMALL CELL LUNG CANCER (HCC): Status: ACTIVE | Noted: 2022-01-01

## 2022-10-21 PROBLEM — E61.1 IRON DEFICIENCY: Status: ACTIVE | Noted: 2022-10-21

## 2022-10-21 PROBLEM — E87.6 HYPOKALEMIA: Status: ACTIVE | Noted: 2022-10-21

## 2022-10-21 PROCEDURE — 99214 OFFICE O/P EST MOD 30 MIN: CPT | Performed by: STUDENT IN AN ORGANIZED HEALTH CARE EDUCATION/TRAINING PROGRAM

## 2022-10-21 PROCEDURE — 96374 THER/PROPH/DIAG INJ IV PUSH: CPT | Performed by: EMERGENCY MEDICINE

## 2022-10-21 PROCEDURE — 96375 TX/PRO/DX INJ NEW DRUG ADDON: CPT | Performed by: EMERGENCY MEDICINE

## 2022-10-21 PROCEDURE — 96361 HYDRATE IV INFUSION ADD-ON: CPT | Performed by: EMERGENCY MEDICINE

## 2022-10-21 PROCEDURE — 1123F ACP DISCUSS/DSCN MKR DOCD: CPT | Performed by: STUDENT IN AN ORGANIZED HEALTH CARE EDUCATION/TRAINING PROGRAM

## 2022-10-21 PROCEDURE — 99285 EMERGENCY DEPT VISIT HI MDM: CPT | Performed by: EMERGENCY MEDICINE

## 2022-10-21 NOTE — PROGRESS NOTES
Jose Alberto Eden presents today for   Chief Complaint   Patient presents with    Follow-up     1 month follow up          Is someone accompanying this pt? No     Is the patient using any DME equipment during OV? Walker     Depression Screening:  3 most recent PHQ Screens 10/21/2022   Little interest or pleasure in doing things Not at all   Feeling down, depressed, irritable, or hopeless Nearly every day   Total Score PHQ 2 3       Learning Assessment:  Learning Assessment 9/10/2021   PRIMARY LEARNER Patient   PRIMARY LANGUAGE ENGLISH   LEARNER PREFERENCE PRIMARY DEMONSTRATION   ANSWERED BY Patient   RELATIONSHIP SELF       Fall Risk  Fall Risk Assessment, last 12 mths 10/21/2022   Able to walk? Yes   Fall in past 12 months? 1   Do you feel unsteady? 1   Are you worried about falling 1   Is TUG test greater than 12 seconds? -   Is the gait abnormal? -   Number of falls in past 12 months 2   Fall with injury? 0       ADL  ADL Assessment 9/19/2022   Feeding yourself No Help Needed   Getting from bed to chair No Help Needed   Getting dressed No Help Needed   Bathing or showering No Help Needed   Walk across the room (includes cane/walker) No Help Needed   Using the telphone No Help Needed   Taking your medications No Help Needed   Preparing meals No Help Needed   Managing money (expenses/bills) No Help Needed   Moderately strenuous housework (laundry) No Help Needed   Shopping for personal items (toiletries/medicines) No Help Needed   Shopping for groceries Help Needed   Driving Help Needed   Climbing a flight of stairs No Help Needed   Getting to places beyond walking distances No Help Needed       Health Maintenance reviewed and discussed and ordered per Provider.     Health Maintenance Due   Topic Date Due    Hepatitis C Screening  Never done    Pneumococcal 65+ years (1 - PCV) Never done    DTaP/Tdap/Td series (1 - Tdap) Never done    Lipid Screen  Never done    Colorectal Cancer Screening Combo  Never done Shingrix Vaccine Age 50> (1 of 2) Never done    Low dose CT lung screening  Never done    Bone Densitometry (Dexa) Screening  Never done    Flu Vaccine (1) 08/01/2022    COVID-19 Vaccine (4 - Booster for Pfizer series) 08/10/2022    Medicare Yearly Exam  09/21/2022   . Coordination of Care:  1. \"Have you been to the ER, urgent care clinic since your last visit? Hospitalized since your last visit? \" No    2. \"Have you seen or consulted any other health care providers outside of the [address] [place] [address] since your last visit? \" Yes Where: Oncologist  for infusion       3. For patients aged 39-70: Has the patient had a colonoscopy? Has not had one since 1987 at [facility]     If the patient is female:    4. For patients aged 41-77: Has the patient had a mammogram within the past 2 years? Yes - no Care Gap present    5. For patients aged 21-65: Has the patient had a pap smear?  NA - based on age

## 2022-10-21 NOTE — PROGRESS NOTES
Chronic Disease Follow up    Aysha Claros (: 1955) is a 78 y.o. female here for evaluation of the following chief concerns(s):  Follow-up (1 month follow up /)       ASSESSMENT/PLAN:  1. Non-small cell lung cancer, right ([address]  2. Nausea and vomiting, unspecified vomiting type  3. Weakness generalized  4. Hypotension due to hypovolemia    Overall I am quite concerned about this patient. Her blood pressure was very low in the office today, I suspect secondary to dehydration as she has been unable to keep anything down really for the past month or so- but cannot r/o other pathologies. I do recommend that patient go to the ER, I think that she definitely needs some IV fluids as well as further evaluation. I did review Dr. Vince Gaxiola EGD, there was no significant abnormality related to dysphagia seen on EGD-he suspected a paraneoplastic versus tumor compression as a cause of the nausea/dysphagia. We also reviewed the echo that was done which showed a small pericardial effusion but was otherwise relatively normal.  She has significant right breast swelling that continues to worsen. She tells me that she has follow-up with the vascular surgeon for the compression of her SVC in mid November. As of now, patient does not have follow-up with oncology until . She states that she has a repeat CT scan of the chest on . Despite my recommendation to present to the ER, patient does not think it is likely that she will go. She understands risks associated. I did call [facility] to see if patient could be scheduled ASAP-Dr. Olayinka Ryan was out of the office today and her schedule is booked up for the next week-the  was going to send her an email for me outlining the concerns, I also gave her my phone number. Someone from the oncology office should reach out to patient on Monday. I would like to see patient back in the office next week.   Hold Lasix for now given her BP- does not seem to be helping the swelling anyhow. Return in about 1 week (around 10/28/2022). Loida Palm agrees with plan as above and has no additional questions at this time. SUBJECTIVE/OBJECTIVE:    Patient presents for follow-up nausea and vomiting. Patient states she feels very poor today. She is weak, dizzy, tired of feeling so poorly. She is still been unable to eat much or keep anything down. She is coughing so much that she is vomiting. She had an EGD done by Dr. Ayaka Cueva last week for her dysphagia, nothing significant was found. Patient denies any chest pain. She endorses continued swelling of the right breast as well as the right side of the face and the right arm. She has been unable to get into see her vascular doctor until November. Patient took a chemoinfusion last week, but does not see oncology until mid November as well. She is post to be getting an another CT scan of her chest.  Patient tells me that she recently got labs done at her the oncology office and they told her that her potassium was low and that her iron levels were low as well, they prescribe her some medications for this.       Past Medical History:   Diagnosis Date    Abnormal CXR     Arthritis     Bronchitis     Cancer ([address]     RT lung    Chronic obstructive pulmonary disease ([address]     COVID-19     Menopause      Past Surgical History:   Procedure Laterality Date    HX HYSTERECTOMY  1987    TOSHA due to endometrosis, at age 28    HX OOPHORECTOMY       Family History   Problem Relation Age of Onset    Heart Disease Mother     Diabetes Father     Cancer Father         Merkel cell in his hand to lymph nodes    COPD Brother     Emphysema Brother     Uterine Cancer Maternal Aunt     Colon Cancer Maternal Uncle     Cancer Paternal Aunt         brain    Cancer Paternal Uncle         Brain tumor       Social History     Socioeconomic History    Marital status:    Tobacco Use    Smoking status: Every Day Packs/day: 1.00     Years: 40.00     Pack years: 40.00     Types: Cigarettes    Smokeless tobacco: Never   Vaping Use    Vaping Use: Never used   Substance and Sexual Activity    Alcohol use: Yes     Comment: seldom    Drug use: Not Currently     Types: Marijuana     Social Determinants of Health     Financial Resource Strain: Low Risk     Difficulty of Paying Living Expenses: Not hard at all   Food Insecurity: No Food Insecurity    Worried About [address] in the Last Year: Never true    Ran Out of Food in the Last Year: Never true   Transportation Needs: No Transportation Needs    Lack of Transportation (Medical): No    Lack of Transportation (Non-Medical): No   Housing Stability: Low Risk     Unable to Pay for Housing in the Last Year: No    Number of [place] in the Last Year: 1    Unstable Housing in the Last Year: No     Social History     Tobacco Use   Smoking Status Every Day    Packs/day: 1.00    Years: 40.00    Pack years: 40.00    Types: Cigarettes   Smokeless Tobacco Never       Current Outpatient Medications   Medication Sig Dispense Refill    loratadine (CLARITIN) 10 mg tablet Take 10 mg by mouth daily. predniSONE (DELTASONE) 50 mg tablet Take 50 mg by mouth as needed. furosemide (LASIX) 20 mg tablet Take 20 mg by mouth as needed. famotidine (PEPCID) 20 mg tablet       benzonatate (TESSALON) 100 mg capsule Take 1 Capsule by mouth. ondansetron hcl (ZOFRAN) 8 mg tablet Take 8 mg by mouth every eight (8) hours as needed for Nausea or Nausea or Vomiting. ibuprofen (MOTRIN) 200 mg tablet Take 800 mg by mouth as needed for Pain. Taken with food      budesonide-formoteroL (Symbicort) 160-4.5 mcg/actuation HFAA Take 1 Puff by inhalation two (2) times a day. 1 Each 2    albuterol (PROVENTIL HFA, VENTOLIN HFA, PROAIR HFA) 90 mcg/actuation inhaler Take 2 Puffs by inhalation every six (6) hours as needed for Wheezing.  1 Each 5    acetaminophen (TYLENOL) 500 mg tablet Take 1,000 mg by mouth every six (6) hours as needed for Pain. (Patient not taking: No sig reported)      triamcinolone acetonide (KENALOG) 0.1 % topical cream Apply  to affected area four (4) times daily as needed. (Patient not taking: No sig reported)      cyclobenzaprine (FLEXERIL) 10 mg tablet Take 1 Tablet by mouth three (3) times daily as needed for Muscle Spasm(s). (Patient not taking: No sig reported) 60 Tablet 1    methocarbamoL (ROBAXIN) 500 mg tablet Take 1 Tablet by mouth four (4) times daily. (Patient not taking: No sig reported) 30 Tablet 2     Allergies   Allergen Reactions    Iodine And Iodide Containing Products Hives    Olanzapine Other (comments)       BP 90/65   Pulse 100   Temp 97.6 °F (36.4 °C) (Temporal)   Ht 5' 6\" (1.676 m)   Wt 163 lb 3.2 oz (74 kg)   SpO2 95%   BMI 26.34 kg/m²     Gen: NAD, well appearing   Heart: RRR, no m/g/r  Lungs: CTA bilaterally, no wheezing, breathing comfortably  Abd: Soft, non tender to palpation  Ext: No swelling  Psych: cooperative. Appropriate mood and affect. Lab Results   Component Value Date/Time    WBC 7.3 09/27/2022 12:00 AM    HGB 11.3 09/27/2022 12:00 AM    HCT 33.4 (L) 09/27/2022 12:00 AM    PLATELET 056 87/83/8843 12:00 AM    MCV 84 09/27/2022 12:00 AM     Lab Results   Component Value Date/Time    Sodium 143 09/27/2022 12:00 AM    Potassium 3.6 09/27/2022 12:00 AM    Chloride 95 (L) 09/27/2022 12:00 AM    CO2 27 09/27/2022 12:00 AM    Anion gap 13 08/11/2021 10:15 AM    Glucose 112 (H) 09/27/2022 12:00 AM    BUN 7 (L) 09/27/2022 12:00 AM    Creatinine 0.49 (L) 09/27/2022 12:00 AM    BUN/Creatinine ratio 14 09/27/2022 12:00 AM    GFR est AA >60 08/11/2021 10:15 AM    GFR est non-AA >60 08/11/2021 10:15 AM    Calcium 10.0 09/27/2022 12:00 AM    Bilirubin, total 0.3 09/27/2022 12:00 AM    Alk.  phosphatase 86 09/27/2022 12:00 AM    Protein, total 6.3 09/27/2022 12:00 AM    Albumin 3.9 09/27/2022 12:00 AM    Globulin 3.2 08/11/2021 10:15 AM    A-G Ratio 1.6 09/27/2022 12:00 AM    ALT (SGPT) 9 09/27/2022 12:00 AM    AST (SGOT) 13 09/27/2022 12:00 AM     No results found for: CHOL, CHOLPOCT, CHOLX, CHLST, CHOLV, TOTCHOLEXT, HDL, HDLPOC, HDLEXT, HDLP, LDL, LDLCPOC, LDLCEXT, LDLC, DLDLP, VLDLC, VLDL, TGLX, TRIGL, TRIGLYCEXT, TRIGP, TGLPOCT, CHHD, CHHDX    On this date 10/21/22 I have spent 45 minutes reviewing previous notes, test results and face to face with the patient for interview/exam, discussing working diagnosis and treatment plan as well as documenting on the day of the visit. Medical decision making complexity: moderate-high    I have discussed the diagnosis with the patient and the intended plan as seen in the above orders. The patient has received an after-visit summary and questions were answered concerning future plans. I have discussed medication side effects and warnings with the patient as well. I have reviewed the plan of care with the patient, accepted their input and they are in agreement with the treatment goals. Previous lab and imaging results were reviewed by me.      Jazmin Thompson MD   Family Medicine

## 2022-10-22 ENCOUNTER — APPOINTMENT (OUTPATIENT)
Dept: CT IMAGING | Age: 67
End: 2022-10-22
Attending: EMERGENCY MEDICINE
Payer: MEDICARE

## 2022-10-22 VITALS
HEART RATE: 82 BPM | WEIGHT: 163.2 LBS | TEMPERATURE: 97.6 F | SYSTOLIC BLOOD PRESSURE: 112 MMHG | RESPIRATION RATE: 18 BRPM | DIASTOLIC BLOOD PRESSURE: 75 MMHG | BODY MASS INDEX: 26.23 KG/M2 | HEIGHT: 66 IN | OXYGEN SATURATION: 95 %

## 2022-10-22 LAB
ALBUMIN SERPL-MCNC: 2.8 G/DL (ref 3.4–5)
ALBUMIN/GLOB SERPL: 0.8 {RATIO} (ref 0.8–1.7)
ALP SERPL-CCNC: 87 U/L (ref 45–117)
ALT SERPL-CCNC: 13 U/L (ref 13–56)
ANION GAP SERPL CALC-SCNC: 7 MMOL/L (ref 3–18)
APPEARANCE UR: CLEAR
AST SERPL W P-5'-P-CCNC: 7 U/L (ref 10–38)
BACTERIA URNS QL MICRO: NEGATIVE /HPF
BASOPHILS # BLD: 0 K/UL (ref 0–0.1)
BASOPHILS NFR BLD: 0 % (ref 0–2)
BILIRUB SERPL-MCNC: 0.5 MG/DL (ref 0.2–1)
BILIRUB UR QL: NEGATIVE
BNP SERPL-MCNC: 197 PG/ML (ref 0–900)
BUN SERPL-MCNC: 15 MG/DL (ref 7–18)
BUN/CREAT SERPL: 33 (ref 12–20)
CA-I BLD-MCNC: 9.4 MG/DL (ref 8.5–10.1)
CHLORIDE SERPL-SCNC: 97 MMOL/L (ref 100–111)
CO2 SERPL-SCNC: 31 MMOL/L (ref 21–32)
COLOR UR: YELLOW
CREAT SERPL-MCNC: 0.46 MG/DL (ref 0.6–1.3)
DIFFERENTIAL METHOD BLD: ABNORMAL
EOSINOPHIL # BLD: 0.1 K/UL (ref 0–0.4)
EOSINOPHIL NFR BLD: 1 % (ref 0–5)
EPITH CASTS URNS QL MICRO: ABNORMAL /LPF (ref 0–20)
ERYTHROCYTE [DISTWIDTH] IN BLOOD BY AUTOMATED COUNT: 14.6 % (ref 11.6–14.5)
GLOBULIN SER CALC-MCNC: 3.7 G/DL (ref 2–4)
GLUCOSE SERPL-MCNC: 98 MG/DL (ref 74–99)
GLUCOSE UR STRIP.AUTO-MCNC: NEGATIVE MG/DL
HCT VFR BLD AUTO: 29.7 % (ref 35–45)
HGB BLD-MCNC: 9.5 G/DL (ref 12–16)
HGB UR QL STRIP: NEGATIVE
IMM GRANULOCYTES # BLD AUTO: 0 K/UL (ref 0–0.04)
IMM GRANULOCYTES NFR BLD AUTO: 0 % (ref 0–0.5)
KETONES UR QL STRIP.AUTO: 40 MG/DL
LEUKOCYTE ESTERASE UR QL STRIP.AUTO: NEGATIVE
LYMPHOCYTES # BLD: 1.2 K/UL (ref 0.9–3.6)
LYMPHOCYTES NFR BLD: 13 % (ref 21–52)
MCH RBC QN AUTO: 28.4 PG (ref 24–34)
MCHC RBC AUTO-ENTMCNC: 32 G/DL (ref 31–37)
MCV RBC AUTO: 88.9 FL (ref 78–100)
MONOCYTES # BLD: 0.7 K/UL (ref 0.05–1.2)
MONOCYTES NFR BLD: 8 % (ref 3–10)
NEUTS SEG # BLD: 7.2 K/UL (ref 1.8–8)
NEUTS SEG NFR BLD: 78 % (ref 40–73)
NITRITE UR QL STRIP.AUTO: NEGATIVE
NRBC # BLD: 0 K/UL (ref 0–0.01)
NRBC BLD-RTO: 0 PER 100 WBC
PH UR STRIP: 6 [PH] (ref 5–8)
PLATELET # BLD AUTO: 407 K/UL (ref 135–420)
PMV BLD AUTO: 9.7 FL (ref 9.2–11.8)
POTASSIUM SERPL-SCNC: 3.1 MMOL/L (ref 3.5–5.5)
PROT SERPL-MCNC: 6.5 G/DL (ref 6.4–8.2)
PROT UR STRIP-MCNC: NEGATIVE MG/DL
RBC # BLD AUTO: 3.34 M/UL (ref 4.2–5.3)
RBC #/AREA URNS HPF: ABNORMAL /HPF (ref 0–2)
SODIUM SERPL-SCNC: 135 MMOL/L (ref 136–145)
SP GR UR REFRACTOMETRY: >1.03 (ref 1–1.03)
TROPONIN-HIGH SENSITIVITY: 7 NG/L (ref 0–54)
UROBILINOGEN UR QL STRIP.AUTO: 0.2 EU/DL (ref 0.2–1)
WBC # BLD AUTO: 9.2 K/UL (ref 4.6–13.2)
WBC URNS QL MICRO: ABNORMAL /HPF (ref 0–4)

## 2022-10-22 PROCEDURE — 93005 ELECTROCARDIOGRAM TRACING: CPT

## 2022-10-22 PROCEDURE — 96361 HYDRATE IV INFUSION ADD-ON: CPT

## 2022-10-22 PROCEDURE — 83880 ASSAY OF NATRIURETIC PEPTIDE: CPT

## 2022-10-22 PROCEDURE — 80053 COMPREHEN METABOLIC PANEL: CPT

## 2022-10-22 PROCEDURE — 36415 COLL VENOUS BLD VENIPUNCTURE: CPT

## 2022-10-22 PROCEDURE — 81003 URINALYSIS AUTO W/O SCOPE: CPT

## 2022-10-22 PROCEDURE — 74011000636 HC RX REV CODE- 636: Performed by: EMERGENCY MEDICINE

## 2022-10-22 PROCEDURE — 85025 COMPLETE CBC W/AUTO DIFF WBC: CPT

## 2022-10-22 PROCEDURE — 71260 CT THORAX DX C+: CPT

## 2022-10-22 PROCEDURE — 84484 ASSAY OF TROPONIN QUANT: CPT

## 2022-10-22 PROCEDURE — 74011250636 HC RX REV CODE- 250/636: Performed by: EMERGENCY MEDICINE

## 2022-10-22 PROCEDURE — 74011250637 HC RX REV CODE- 250/637: Performed by: EMERGENCY MEDICINE

## 2022-10-22 RX ORDER — DIPHENHYDRAMINE HYDROCHLORIDE 50 MG/ML
50 INJECTION, SOLUTION INTRAMUSCULAR; INTRAVENOUS
Status: COMPLETED | OUTPATIENT
Start: 2022-10-22 | End: 2022-10-22

## 2022-10-22 RX ORDER — POTASSIUM CHLORIDE 750 MG/1
20 TABLET, EXTENDED RELEASE ORAL
Status: COMPLETED | OUTPATIENT
Start: 2022-10-22 | End: 2022-10-22

## 2022-10-22 RX ORDER — ONDANSETRON 2 MG/ML
4 INJECTION INTRAMUSCULAR; INTRAVENOUS ONCE
Status: COMPLETED | OUTPATIENT
Start: 2022-10-22 | End: 2022-10-22

## 2022-10-22 RX ADMIN — DIPHENHYDRAMINE HYDROCHLORIDE 50 MG: 50 INJECTION, SOLUTION INTRAMUSCULAR; INTRAVENOUS at 00:21

## 2022-10-22 RX ADMIN — POTASSIUM CHLORIDE 20 MEQ: 750 TABLET, EXTENDED RELEASE ORAL at 04:55

## 2022-10-22 RX ADMIN — IOPAMIDOL 96 ML: 755 INJECTION, SOLUTION INTRAVENOUS at 01:56

## 2022-10-22 RX ADMIN — SODIUM CHLORIDE 1000 ML: 9 INJECTION, SOLUTION INTRAVENOUS at 00:18

## 2022-10-22 RX ADMIN — ONDANSETRON 4 MG: 2 INJECTION INTRAMUSCULAR; INTRAVENOUS at 00:20

## 2022-10-22 RX ADMIN — METHYLPREDNISOLONE SODIUM SUCCINATE 40 MG: 40 INJECTION, POWDER, FOR SOLUTION INTRAMUSCULAR; INTRAVENOUS at 00:22

## 2022-10-22 NOTE — ED PROVIDER NOTES
EMERGENCY DEPARTMENT HISTORY AND PHYSICAL EXAM      Date: 10/21/2022  Patient Name: Lovena Holter    History of Presenting Illness     Chief Complaint   Patient presents with    Lethargy       History Provided By: Patient    HPI: Lovena Holter, 78 y.o. female Saw PCP this morning and was told she looked dehydrated. he directed her to [facility] but the wait was too long in the ER so she came here. Per family pt not eating or dinking, pt has lung cancer and reports cough and phlegm. hx of hypokalemia and anemia. Patient has active lung cancer. SVC Stent was put in in June due to congestion, but is currently collapsing. Patient receiving immunotherapy. Patient states the last few days she has been throwing up everything she has eaten. Last immunotherapy was wendesday. Oncology - Dr. Arneta Eisenmenger oncology    There are no other complaints, changes, or physical findings at this time. PCP: Shelbie Mayberry MD    Current Facility-Administered Medications   Medication Dose Route Frequency Provider Last Rate Last Admin    potassium chloride (KLOR-CON M10) tablet 20 mEq  20 mEq Oral NOW Maximo Newman MD         Current Outpatient Medications   Medication Sig Dispense Refill    loratadine (CLARITIN) 10 mg tablet Take 10 mg by mouth daily. predniSONE (DELTASONE) 50 mg tablet Take 50 mg by mouth as needed. furosemide (LASIX) 20 mg tablet Take 20 mg by mouth as needed. famotidine (PEPCID) 20 mg tablet       benzonatate (TESSALON) 100 mg capsule Take 1 Capsule by mouth. ondansetron hcl (ZOFRAN) 8 mg tablet Take 8 mg by mouth every eight (8) hours as needed for Nausea or Nausea or Vomiting. ibuprofen (MOTRIN) 200 mg tablet Take 800 mg by mouth as needed for Pain. Taken with food      budesonide-formoteroL (Symbicort) 160-4.5 mcg/actuation HFAA Take 1 Puff by inhalation two (2) times a day.  1 Each 2    albuterol (PROVENTIL HFA, VENTOLIN HFA, PROAIR HFA) 90 mcg/actuation inhaler Take 2 Puffs by inhalation every six (6) hours as needed for Wheezing. 1 Each 5       Past History   Past Medical History:  Past Medical History:   Diagnosis Date    Abnormal CXR     Arthritis     Bronchitis     Cancer ([facility] [address]     RT lung    Chronic obstructive pulmonary disease ([facility] [address]     COVID-19     Menopause        Past Surgical History:  Past Surgical History:   Procedure Laterality Date    HX HYSTERECTOMY  1987    TOSHA due to endometrosis, at age 28    HX OOPHORECTOMY         Family History:  Family History   Problem Relation Age of Onset    Heart Disease Mother     Diabetes Father     Cancer Father         Merkel cell in his hand to lymph nodes    COPD Brother     Emphysema Brother     Uterine Cancer Maternal Aunt     Colon Cancer Maternal Uncle     Cancer Paternal Aunt         brain    Cancer Paternal Uncle         Brain tumor       Social History:  Social History     Tobacco Use    Smoking status: Every Day     Packs/day: 0.50     Years: 40.00     Pack years: 20.00     Types: Cigarettes    Smokeless tobacco: Never   Vaping Use    Vaping Use: Never used   Substance Use Topics    Alcohol use: Yes     Comment: seldom    Drug use: Not Currently     Types: Marijuana       Allergies: Allergies   Allergen Reactions    Iodine And Iodide Containing Products Hives    Olanzapine Other (comments)     Review of Systems   Review of Systems   Constitutional:  Positive for activity change, appetite change and fatigue. Negative for fever. HENT:  Negative for congestion, rhinorrhea and sore throat. Respiratory:  Positive for cough, chest tightness and shortness of breath. Cardiovascular:  Negative for chest pain and palpitations. Gastrointestinal:  Positive for nausea. Negative for abdominal pain, diarrhea and vomiting. Genitourinary:  Negative for difficulty urinating and dysuria. Musculoskeletal:  Positive for back pain. Negative for arthralgias and myalgias. Skin:  Negative for color change and rash.    Neurological: Positive for weakness. Negative for light-headedness and headaches. Physical Exam   Physical Exam  Vitals and nursing note reviewed. Constitutional:       Appearance: Normal appearance. HENT:      Head: Normocephalic and atraumatic. Right Ear: Tympanic membrane normal.      Left Ear: Tympanic membrane normal.      Nose: Nose normal.      Mouth/Throat:      Mouth: Mucous membranes are dry. Eyes:      Conjunctiva/sclera: Conjunctivae normal.   Cardiovascular:      Rate and Rhythm: Normal rate and regular rhythm. Pulses: Normal pulses. Heart sounds: Normal heart sounds. Pulmonary:      Effort: Pulmonary effort is normal.      Breath sounds: No rhonchi. Chest:      Chest wall: No tenderness. Abdominal:      General: Bowel sounds are normal.      Palpations: Abdomen is soft. Tenderness: There is no abdominal tenderness. Musculoskeletal:         General: Normal range of motion. Cervical back: Normal range of motion and neck supple. Skin:     General: Skin is warm and dry. Capillary Refill: Capillary refill takes less than 2 seconds. Neurological:      Mental Status: She is alert. Mental status is at baseline. Psychiatric:         Mood and Affect: Mood normal.         Thought Content:  Thought content normal.           Labs -     Recent Results (from the past 12 hour(s))   CBC WITH AUTOMATED DIFF    Collection Time: 10/22/22 12:40 AM   Result Value Ref Range    WBC 9.2 4.6 - 13.2 K/uL    RBC 3.34 (L) 4.20 - 5.30 M/uL    HGB 9.5 (L) 12.0 - 16.0 g/dL    HCT 29.7 (L) 35.0 - 45.0 %    MCV 88.9 78.0 - 100.0 FL    MCH 28.4 24.0 - 34.0 PG    MCHC 32.0 31.0 - 37.0 g/dL    RDW 14.6 (H) 11.6 - 14.5 %    PLATELET 524 024 - 921 K/uL    MPV 9.7 9.2 - 11.8 FL    NRBC 0.0 0.0  WBC    ABSOLUTE NRBC 0.00 0.00 - 0.01 K/uL    NEUTROPHILS 78 (H) 40 - 73 %    LYMPHOCYTES 13 (L) 21 - 52 %    MONOCYTES 8 3 - 10 %    EOSINOPHILS 1 0 - 5 %    BASOPHILS 0 0 - 2 %    IMMATURE GRANULOCYTES 0 0 - 0.5 %    ABS. NEUTROPHILS 7.2 1.8 - 8.0 K/UL    ABS. LYMPHOCYTES 1.2 0.9 - 3.6 K/UL    ABS. MONOCYTES 0.7 0.05 - 1.2 K/UL    ABS. EOSINOPHILS 0.1 0.0 - 0.4 K/UL    ABS. BASOPHILS 0.0 0.0 - 0.1 K/UL    ABS. IMM. GRANS. 0.0 0.00 - 0.04 K/UL    DF AUTOMATED     METABOLIC PANEL, COMPREHENSIVE    Collection Time: 10/22/22 12:40 AM   Result Value Ref Range    Sodium 135 (L) 136 - 145 mmol/L    Potassium 3.1 (L) 3.5 - 5.5 mmol/L    Chloride 97 (L) 100 - 111 mmol/L    CO2 31 21 - 32 mmol/L    Anion gap 7 3.0 - 18.0 mmol/L    Glucose 98 74 - 99 mg/dL    BUN 15 7 - 18 mg/dL    Creatinine 0.46 (L) 0.60 - 1.30 mg/dL    BUN/Creatinine ratio 33 (H) 12 - 20      eGFR >60 >60 ml/min/1.73m2    Calcium 9.4 8.5 - 10.1 mg/dL    Bilirubin, total 0.5 0.2 - 1.0 mg/dL    AST (SGOT) 7 (L) 10 - 38 U/L    ALT (SGPT) 13 13 - 56 U/L    Alk.  phosphatase 87 45 - 117 U/L    Protein, total 6.5 6.4 - 8.2 g/dL    Albumin 2.8 (L) 3.4 - 5.0 g/dL    Globulin 3.7 2.0 - 4.0 g/dL    A-G Ratio 0.8 0.8 - 1.7     TROPONIN-HIGH SENSITIVITY    Collection Time: 10/22/22 12:40 AM   Result Value Ref Range    Troponin-High Sensitivity 7 0 - 54 ng/L   NT-PRO BNP    Collection Time: 10/22/22 12:40 AM   Result Value Ref Range    NT pro- 0 - 900 pg/mL   URINALYSIS W/ RFLX MICROSCOPIC    Collection Time: 10/22/22  4:15 AM   Result Value Ref Range    Color Yellow      Appearance Clear      Specific gravity >1.030 (H) 1.005 - 1.030    pH (UA) 6.0 5.0 - 8.0      Protein Negative Negative mg/dL    Glucose Negative Negative mg/dL    Ketone 40 (A) Negative mg/dL    Bilirubin Negative Negative      Blood Negative Negative      Urobilinogen 0.2 0.2 - 1.0 EU/dL    Nitrites Negative Negative      Leukocyte Esterase Negative Negative      WBC 0-4 0 - 4 /hpf    RBC 0-5 0 - 2 /hpf    Epithelial cells Few 0 - 20 /lpf    Bacteria Negative (A) None /hpf       Radiologic Studies -   [unfilled]  CT Results  (Last 48 hours)                 10/22/22 0156  CT CHEST W CONT Final result    Impression:  7.7 x 8.5 x 10 cm right apical mass extending into the mediastinum   with narrowing of the right mainstem bronchus and right pulmonary artery. Superior vena cava  stent in place. No significant flow is seen through the   stent. There is a small to moderate right and a minimal left pleural effusion with   minimal associated atelectasis. There is a small mostly dependent pericardial effusion. Narrative:  EXAM: CT CHEST W CONT       INDICATION: lung mass, chest pain, sob       COMPARISON: None. CONTRAST: mL of Isovue-300. TECHNIQUE:    Multislice helical CT was performed from the thoracic inlet to the adrenal   glands during uneventful rapid bolus intravenous contrast administration. Contiguous 5 mm axial images were reconstructed and lung and soft tissue windows   were generated. Coronal and sagittal reformations were generated. CT dose   reduction was achieved through use of a standardized protocol tailored for this   examination and automatic exposure control for dose modulation. FINDINGS:   There is a 7.7 x 8.5 x 10 cm right apical mass extending into the mediastinum is   significantly increased in size compared to prior. There is narrowing of the   right mainstem bronchus and right pulmonary artery. Narrowing the right mainstem   bronchus and right pulmonary artery. No flow is seen to a right superior vena   cava stent. There is a small mostly dependent pericardial effusion. Small to moderate right and minimal left pleural effusion with minimal   associated atelectasis. A superior vena cava stent is noted in place. There is   no flow through the stent. The aorta enhances normally with no evidence of aneurysm or dissection. There is no mediastinal or hilar or axillary adenopathy.        The visualized portions of the upper abdominal organs are normal.                 CXR Results  (Last 48 hours)      None Medical Decision Making and ED Course   Differential Diagnosis & Medical Decision Making Provider Note:   72yo F presenting to ed with generalized fatigue and weakness. Poor po intake. She reports acute on chronic sob. No nausea or vomiting. She has pleural effusion on CT as well as confirmation of collapsed SVC stent. She is feeling much improved after IVF and we discussed admission versus outpatient followup and she would prefer to follow up. She otherwise appears to feel much better and these findings are reported by her to be known. She agrees to return ot the ed for any acute concerns or deteriorations. - I am the first and primary provider for this patient. I reviewed the vital signs, available nursing notes, past medical history, past surgical history, family history and social history. The patient's presenting problems have been discussed, and the staff are in agreement with the care plan formulated and outlined with them. I have encouraged them to ask questions as they arise throughout their visit. Vital Signs-Reviewed the patient's vital signs. Patient Vitals for the past 12 hrs:   Temp Pulse Resp BP SpO2   10/21/22 2325 97.6 °F (36.4 °C) 94 18 (!) 130/58 95 %       EKG interpretation: (Preliminary): EKG Interpreted by me. Shows No stemi. No ectopy. NSR WI 110ms, HR 78bpm.     ED Course:            Disposition   Disposition: Condition stable  DC- Adult Discharges: All of the diagnostic tests were reviewed and questions answered. Diagnosis, care plan and treatment options were discussed. The patient understands the instructions and will follow up as directed. The patients results have been reviewed with them. They have been counseled regarding their diagnosis. The patient verbally convey understanding and agreement of the signs, symptoms, diagnosis, treatment and prognosis and additionally agrees to follow up as recommended with their PCP in 24 - 48 hours.   They also agree with the care-plan and convey that all of their questions have been answered. I have also put together some discharge instructions for them that include: 1) educational information regarding their diagnosis, 2) how to care for their diagnosis at home, as well a 3) list of reasons why they would want to return to the ED prior to their follow-up appointment, should their condition change. DC-The patient was given verbal chest pain warning signs and, dehydration, and follow-up instructions  DC- Pain Control DC Home plan: Nonsteroidals and Tylenol    DISCHARGE PLAN:  1. Current Discharge Medication List        CONTINUE these medications which have NOT CHANGED    Details   loratadine (CLARITIN) 10 mg tablet Take 10 mg by mouth daily. predniSONE (DELTASONE) 50 mg tablet Take 50 mg by mouth as needed. furosemide (LASIX) 20 mg tablet Take 20 mg by mouth as needed. famotidine (PEPCID) 20 mg tablet       benzonatate (TESSALON) 100 mg capsule Take 1 Capsule by mouth. ondansetron hcl (ZOFRAN) 8 mg tablet Take 8 mg by mouth every eight (8) hours as needed for Nausea or Nausea or Vomiting. ibuprofen (MOTRIN) 200 mg tablet Take 800 mg by mouth as needed for Pain. Taken with food      budesonide-formoteroL (Symbicort) 160-4.5 mcg/actuation HFAA Take 1 Puff by inhalation two (2) times a day. Qty: 1 Each, Refills: 2      albuterol (PROVENTIL HFA, VENTOLIN HFA, PROAIR HFA) 90 mcg/actuation inhaler Take 2 Puffs by inhalation every six (6) hours as needed for Wheezing. Qty: 1 Each, Refills: 5    Associated Diagnoses: Chronic obstructive pulmonary disease, unspecified COPD type ([facility] [address]           2. Follow-up Information       Follow up With Specialties Details Why Contact Info    Isamar Penn MD Family Medicine Go in 2 days  [address]  [phone]      [facility] EMERGENCY DEPT Emergency Medicine Go to  As needed, or for any concerns or deteriorations. , if symptoms persist or worsen. [address]  [phone]          3. Return to ED if worse   4. Current Discharge Medication List           Diagnosis/Clinical Impression     Clinical Impression:   1. Fatigue, unspecified type    2. Dehydration        Attestations: Chuy Banda MD, am the primary clinician of record. Please note that this dictation was completed with [employer], the computer voice recognition software. Quite often unanticipated grammatical, syntax, homophones, and other interpretive errors are inadvertently transcribed by the computer software. Please disregard these errors. Please excuse any errors that have escaped final proofreading. Thank you.

## 2022-10-22 NOTE — ED NOTES
I have reviewed discharge instructions with the patient and adult child. The patient and adult child  verbalized understanding.

## 2022-10-22 NOTE — ED TRIAGE NOTES
Patient reports she saw her PCP today and was told she was dehydrated and was told she needed to go to [facility] to where her cancer doctors were, but the wait was too long in the ER so she came here. Patient has active lung cancer. Stent was put in in June, but is currently collapsing. Patient receiving immunotherapy. Patient states the last few days she has been throwing up everything she has eaten.

## 2022-10-23 PROCEDURE — 96360 HYDRATION IV INFUSION INIT: CPT

## 2022-10-23 PROCEDURE — 99284 EMERGENCY DEPT VISIT MOD MDM: CPT

## 2022-10-24 ENCOUNTER — HOSPITAL ENCOUNTER (EMERGENCY)
Age: 67
Discharge: HOME | End: 2022-10-24
Attending: EMERGENCY MEDICINE
Payer: MEDICARE

## 2022-10-24 ENCOUNTER — TELEPHONE (OUTPATIENT)
Dept: FAMILY MEDICINE CLINIC | Age: 67
End: 2022-10-24

## 2022-10-24 VITALS
RESPIRATION RATE: 19 BRPM | BODY MASS INDEX: 26.31 KG/M2 | HEART RATE: 92 BPM | OXYGEN SATURATION: 94 % | SYSTOLIC BLOOD PRESSURE: 127 MMHG | DIASTOLIC BLOOD PRESSURE: 74 MMHG | TEMPERATURE: 97.9 F | WEIGHT: 163 LBS

## 2022-10-24 DIAGNOSIS — R11.2 NAUSEA AND VOMITING, UNSPECIFIED VOMITING TYPE: Primary | ICD-10-CM

## 2022-10-24 DIAGNOSIS — C34.91 NON-SMALL CELL LUNG CANCER, RIGHT (HCC): ICD-10-CM

## 2022-10-24 DIAGNOSIS — E87.6 HYPOKALEMIA: Primary | ICD-10-CM

## 2022-10-24 DIAGNOSIS — R11.2 NAUSEA AND VOMITING, UNSPECIFIED VOMITING TYPE: ICD-10-CM

## 2022-10-24 LAB
ANION GAP SERPL CALC-SCNC: 9 MMOL/L (ref 3–18)
ATRIAL RATE: 81 BPM
ATRIAL RATE: 91 BPM
ATRIAL RATE: 97 BPM
BASOPHILS # BLD: 0 K/UL (ref 0–0.1)
BASOPHILS NFR BLD: 0 % (ref 0–2)
BUN SERPL-MCNC: 14 MG/DL (ref 7–18)
BUN/CREAT SERPL: 25 (ref 12–20)
CA-I BLD-MCNC: 9.6 MG/DL (ref 8.5–10.1)
CALCULATED P AXIS, ECG09: 55 DEGREES
CALCULATED P AXIS, ECG09: 61 DEGREES
CALCULATED R AXIS, ECG10: 34 DEGREES
CALCULATED R AXIS, ECG10: 34 DEGREES
CALCULATED R AXIS, ECG10: 37 DEGREES
CALCULATED T AXIS, ECG11: -46 DEGREES
CALCULATED T AXIS, ECG11: 29 DEGREES
CHLORIDE SERPL-SCNC: 97 MMOL/L (ref 100–111)
CO2 SERPL-SCNC: 28 MMOL/L (ref 21–32)
CREAT SERPL-MCNC: 0.55 MG/DL (ref 0.6–1.3)
DIAGNOSIS, 93000: NORMAL
DIFFERENTIAL METHOD BLD: ABNORMAL
EOSINOPHIL # BLD: 0.1 K/UL (ref 0–0.4)
EOSINOPHIL NFR BLD: 1 % (ref 0–5)
ERYTHROCYTE [DISTWIDTH] IN BLOOD BY AUTOMATED COUNT: 15.1 % (ref 11.6–14.5)
GLUCOSE SERPL-MCNC: 112 MG/DL (ref 74–99)
HCT VFR BLD AUTO: 32.3 % (ref 35–45)
HGB BLD-MCNC: 10.7 G/DL (ref 12–16)
IMM GRANULOCYTES # BLD AUTO: 0.1 K/UL (ref 0–0.04)
IMM GRANULOCYTES NFR BLD AUTO: 1 % (ref 0–0.5)
LYMPHOCYTES # BLD: 1 K/UL (ref 0.9–3.6)
LYMPHOCYTES NFR BLD: 10 % (ref 21–52)
MCH RBC QN AUTO: 29 PG (ref 24–34)
MCHC RBC AUTO-ENTMCNC: 33.1 G/DL (ref 31–37)
MCV RBC AUTO: 87.5 FL (ref 78–100)
MONOCYTES # BLD: 0.6 K/UL (ref 0.05–1.2)
MONOCYTES NFR BLD: 6 % (ref 3–10)
NEUTS SEG # BLD: 8.6 K/UL (ref 1.8–8)
NEUTS SEG NFR BLD: 82 % (ref 40–73)
NRBC # BLD: 0 K/UL (ref 0–0.01)
NRBC BLD-RTO: 0 PER 100 WBC
P-R INTERVAL, ECG05: 106 MS
P-R INTERVAL, ECG05: 110 MS
P-R INTERVAL, ECG05: 71 MS
PLATELET # BLD AUTO: 449 K/UL (ref 135–420)
PMV BLD AUTO: 9.3 FL (ref 9.2–11.8)
POTASSIUM SERPL-SCNC: 3.2 MMOL/L (ref 3.5–5.5)
Q-T INTERVAL, ECG07: 346 MS
Q-T INTERVAL, ECG07: 354 MS
Q-T INTERVAL, ECG07: 401 MS
QRS DURATION, ECG06: 82 MS
QRS DURATION, ECG06: 85 MS
QRS DURATION, ECG06: 85 MS
QTC CALCULATION (BEZET), ECG08: 404 MS
QTC CALCULATION (BEZET), ECG08: 429 MS
QTC CALCULATION (BEZET), ECG08: 491 MS
RBC # BLD AUTO: 3.69 M/UL (ref 4.2–5.3)
SODIUM SERPL-SCNC: 134 MMOL/L (ref 136–145)
VENTRICULAR RATE, ECG03: 78 BPM
VENTRICULAR RATE, ECG03: 90 BPM
VENTRICULAR RATE, ECG03: 92 BPM
WBC # BLD AUTO: 10.4 K/UL (ref 4.6–13.2)

## 2022-10-24 PROCEDURE — 74011250636 HC RX REV CODE- 250/636: Performed by: EMERGENCY MEDICINE

## 2022-10-24 PROCEDURE — 80048 BASIC METABOLIC PNL TOTAL CA: CPT

## 2022-10-24 PROCEDURE — 74011250637 HC RX REV CODE- 250/637: Performed by: EMERGENCY MEDICINE

## 2022-10-24 PROCEDURE — 85025 COMPLETE CBC W/AUTO DIFF WBC: CPT

## 2022-10-24 PROCEDURE — 93005 ELECTROCARDIOGRAM TRACING: CPT

## 2022-10-24 PROCEDURE — 96360 HYDRATION IV INFUSION INIT: CPT

## 2022-10-24 RX ORDER — POTASSIUM CHLORIDE 750 MG/1
40 TABLET, EXTENDED RELEASE ORAL
Status: COMPLETED | OUTPATIENT
Start: 2022-10-24 | End: 2022-10-24

## 2022-10-24 RX ORDER — POTASSIUM CHLORIDE 20 MEQ/1
TABLET, EXTENDED RELEASE ORAL
Qty: 4 TABLET | Refills: 0 | Status: SHIPPED | OUTPATIENT
Start: 2022-10-24 | End: 2022-10-31

## 2022-10-24 RX ORDER — PROMETHAZINE HYDROCHLORIDE 25 MG/1
25 TABLET ORAL
Qty: 12 TABLET | Refills: 0 | Status: SHIPPED | OUTPATIENT
Start: 2022-10-24

## 2022-10-24 RX ORDER — PROMETHAZINE HYDROCHLORIDE 25 MG/1
25 TABLET ORAL
Qty: 12 TABLET | Refills: 0 | Status: SHIPPED | OUTPATIENT
Start: 2022-10-24 | End: 2022-10-24 | Stop reason: SDUPTHER

## 2022-10-24 RX ADMIN — POTASSIUM CHLORIDE 40 MEQ: 750 TABLET, EXTENDED RELEASE ORAL at 02:31

## 2022-10-24 RX ADMIN — SODIUM CHLORIDE 1000 ML: 9 INJECTION, SOLUTION INTRAVENOUS at 00:49

## 2022-10-24 NOTE — ED TRIAGE NOTES
Pt states at 11pm her heart rate was 46 and then went into the 180s. Pt states she uses a small portable pulse ox at home. PT states she still feels week and is having nausea. Pt seen at this facility Friday for fatigue and nausea.

## 2022-10-24 NOTE — TELEPHONE ENCOUNTER
Called patient to get more information from the message. Patient stated at her appointment she was told to go to [facility]. She sat in the waiting room for 2 hours without being seen. She left and came to [facility] and was seen within 15 minutes. They gave her fluids. She started to feel better Saturday. Yesterday she went to the bathroom and had severe chest pain, she used her SPO2 monitor and her heart rate was 40. She called her son and he took her to the ER. Her pulse was 190 at home before she left and at the ER it was 90. Her heart rate stayed around 90 her whole time at the ER. The ER staff told patient maybe she needs a new SPO2 monitor. The second ER visit they gave her fluids again. She also had a CT scan done. Patient stated her oncologist office has not given her a call today, so I told her to call them. Patient also told me the ER stated her potassium level was low again. Patient is vomiting and heaving and having trouble swallowing and eating. The ER sent in medication for nausea and her potassium but she hasn't heard anything from the pharmacy about them being ready to pick  up. I told patient to try to call the pharmacy to see if they are ready. Patient agreed to call pharmacy and oncology office. I told patient I would send you all information for you to review after you returned from lunch.

## 2022-10-24 NOTE — TELEPHONE ENCOUNTER
Patient called this morning in regards to her going to the emergency room. She states she went last week to [facility]. Patient states she sat in the waiting room for two hours then decided to leave and come to [place]. She states she want to the er twice and is asking if you could give her a call.      Patients call back info;  [phone]

## 2022-10-24 NOTE — TELEPHONE ENCOUNTER
Called patient for request of Dr. Victoria Briones. I informed patient that her oncology office is already closed for the day but Dr. Victoria Briones left a voicemail for them to call her tomorrow and she is going to call first thing in the morning. I asked patient what pharmacy she would like me to re-send her medications to because the [employer] in [place] is having issues. Patient stated [facility]. I told her I would re-send in her ER medications to the new pharmacy.

## 2022-10-24 NOTE — ED NOTES
HPI: Aysha Claros is a 78 y.o. female who presents to the ED with complaint of concerned about her heart rate. She says that she has been using a portable pulse oximeter at home to monitor her heart rate. She says at 6 PM it was 55, and then she checked again a little bit later and it was 180. She has a history of lung cancer and last received her immunotherapy on Wednesday. She says she was seen here yesterday for weakness and nausea. She was given fluids. She had an extensive work-up including a CT of the chest, urine, and extensive blood work done. She says she has vomited 1 time since she left here Friday morning. No chest pain. She says her shortness of breath is about baseline. She notes she does have an SVC stent which has collapsed. This was confirmed on a CT scan from her recent ER visit. ROS:  Constitutional: Negative for fever and chills. HENT: Negative for congestion and sore throat. Eyes: Negative for eye discharge and eye redness. Respiratory: Negative for cough and sputum production. Gastrointestinal: Negative for abdominal pain and diarrhea. Genitourinary: Negative for dysuria, urgency and frequency. Neurological: Negative for dizziness, focal weakness, numbness. Musculoskeletal: Negative for joint swelling and arthralgias. Hematological: Negative for adenopathy. Does not bruise/bleed easily. Psychiatric/Behavioral: Negative for agitation.        Past Medical and Surgical History  Past Medical History:   Diagnosis Date    Abnormal CXR     Arthritis     Bronchitis     Cancer ([address]     RT lung    Chronic obstructive pulmonary disease ([facility] [address]     COVID-19     Menopause      Past Surgical History:   Procedure Laterality Date    HX HYSTERECTOMY  1987    TOSHA due to endometrosis, at age 28    HX OOPHORECTOMY           Social   Social History     Tobacco Use    Smoking status: Every Day     Packs/day: 0.50     Years: 40.00     Pack years: 20.00     Types: Cigarettes Smokeless tobacco: Never   Vaping Use    Vaping Use: Never used   Substance Use Topics    Alcohol use: Yes     Comment: seldom    Drug use: Not Currently     Types: Marijuana       Family History  Family History:   Problem Relation Age of Onset    Heart Disease Mother     Diabetes Father     Cancer Father         Merkel cell in his hand to lymph nodes    COPD Brother     Emphysema Brother     Uterine Cancer Maternal Aunt     Colon Cancer Maternal Uncle     Cancer Paternal Aunt         brain    Cancer Paternal Uncle         Brain tumor         Medications  No current facility-administered medications on file prior to encounter. Current Outpatient Medications on File Prior to Encounter   Medication Sig Dispense Refill    loratadine (CLARITIN) 10 mg tablet Take 10 mg by mouth daily. predniSONE (DELTASONE) 50 mg tablet Take 50 mg by mouth as needed. furosemide (LASIX) 20 mg tablet Take 20 mg by mouth as needed. famotidine (PEPCID) 20 mg tablet       benzonatate (TESSALON) 100 mg capsule Take 1 Capsule by mouth. ondansetron hcl (ZOFRAN) 8 mg tablet Take 8 mg by mouth every eight (8) hours as needed for Nausea or Nausea or Vomiting. ibuprofen (MOTRIN) 200 mg tablet Take 800 mg by mouth as needed for Pain. Taken with food      budesonide-formoteroL (Symbicort) 160-4.5 mcg/actuation HFAA Take 1 Puff by inhalation two (2) times a day. 1 Each 2    albuterol (PROVENTIL HFA, VENTOLIN HFA, PROAIR HFA) 90 mcg/actuation inhaler Take 2 Puffs by inhalation every six (6) hours as needed for Wheezing. 1 Each 5       Allergies  Allergies   Allergen Reactions    Iodine And Iodide Containing Products Hives    Olanzapine Other (comments)       PHYSICAL EXAM:    Patient Vitals for the past 8 hrs:   Temp Pulse Resp BP SpO2   10/24/22 0020 97.9 °F (36.6 °C) 100 24 108/73 95 %         CONSTITUTIONAL:   Well developed, well nourished. Awake & alert.  No acute respiratory distress, non toxic in appearance, not diaphoretic. HEAD:  Normocephalic and atraumatic. EENT:  Pupils are round and reactive, extra-ocular muscles are intact. Sclera are anicteric. Mucous membranes are moist and intact and posterior pharynx is unremarkable. Patient is speaking clearly, not stridorous or muffled and airway is intact. NECK:   Trachea is midline. No lymphadenopathy. Neck is supple without meningismus. CARDIOVASCULAR:  Regular rate and rhythm, no murmurs, gallps or rubs heard. PULMONARY:   Clear to ausculation bilaterally. No wheezing, rales or rhonchi. Patient is speaking in full sentences without accessory muscle usage. ABDOMINAL:  No tenderness. Soft and non distended. Without hepatosplenomegaly. No pulsatile mass. No rebound, guarding, or rigidity. Overall benign exam.    MUSCULOSKELETAL :  Normal  range of motion. No pedal edema. No tenderness. No cellulitis or evidence of trauma. SKIN:  Skin is warm and dry. No diaphoresis, pathological rashes, or lesions. NEUROLOGIC: Alert and awake and appropriately oriented. E      Labs:  Recent Results (from the past 12 hour(s))   CBC WITH AUTOMATED DIFF    Collection Time: 10/24/22 12:25 AM   Result Value Ref Range    WBC 10.4 4.6 - 13.2 K/uL    RBC 3.69 (L) 4.20 - 5.30 M/uL    HGB 10.7 (L) 12.0 - 16.0 g/dL    HCT 32.3 (L) 35.0 - 45.0 %    MCV 87.5 78.0 - 100.0 FL    MCH 29.0 24.0 - 34.0 PG    MCHC 33.1 31.0 - 37.0 g/dL    RDW 15.1 (H) 11.6 - 14.5 %    PLATELET 733 (H) 653 - 420 K/uL    MPV 9.3 9.2 - 11.8 FL    NRBC 0.0 0.0  WBC    ABSOLUTE NRBC 0.00 0.00 - 0.01 K/uL    NEUTROPHILS 82 (H) 40 - 73 %    LYMPHOCYTES 10 (L) 21 - 52 %    MONOCYTES 6 3 - 10 %    EOSINOPHILS 1 0 - 5 %    BASOPHILS 0 0 - 2 %    IMMATURE GRANULOCYTES 1 (H) 0 - 0.5 %    ABS. NEUTROPHILS 8.6 (H) 1.8 - 8.0 K/UL    ABS. LYMPHOCYTES 1.0 0.9 - 3.6 K/UL    ABS. MONOCYTES 0.6 0.05 - 1.2 K/UL    ABS. EOSINOPHILS 0.1 0.0 - 0.4 K/UL    ABS. BASOPHILS 0.0 0.0 - 0.1 K/UL    ABS. IMM.  GRANS. 0.1 (H) 0.00 - 0.04 K/UL    DF AUTOMATED     METABOLIC PANEL, BASIC    Collection Time: 10/24/22 12:25 AM   Result Value Ref Range    Sodium 134 (L) 136 - 145 mmol/L    Potassium 3.2 (L) 3.5 - 5.5 mmol/L    Chloride 97 (L) 100 - 111 mmol/L    CO2 28 21 - 32 mmol/L    Anion gap 9 3.0 - 18.0 mmol/L    Glucose 112 (H) 74 - 99 mg/dL    BUN 14 7 - 18 mg/dL    Creatinine 0.55 (L) 0.60 - 1.30 mg/dL    BUN/Creatinine ratio 25 (H) 12 - 20      eGFR >60 >60 ml/min/1.73m2    Calcium 9.6 8.5 - 10.1 mg/dL       Recent Results (from the past 24 hour(s))   CBC WITH AUTOMATED DIFF    Collection Time: 10/24/22 12:25 AM   Result Value Ref Range    WBC 10.4 4.6 - 13.2 K/uL    RBC 3.69 (L) 4.20 - 5.30 M/uL    HGB 10.7 (L) 12.0 - 16.0 g/dL    HCT 32.3 (L) 35.0 - 45.0 %    MCV 87.5 78.0 - 100.0 FL    MCH 29.0 24.0 - 34.0 PG    MCHC 33.1 31.0 - 37.0 g/dL    RDW 15.1 (H) 11.6 - 14.5 %    PLATELET 909 (H) 529 - 420 K/uL    MPV 9.3 9.2 - 11.8 FL    NRBC 0.0 0.0  WBC    ABSOLUTE NRBC 0.00 0.00 - 0.01 K/uL    NEUTROPHILS 82 (H) 40 - 73 %    LYMPHOCYTES 10 (L) 21 - 52 %    MONOCYTES 6 3 - 10 %    EOSINOPHILS 1 0 - 5 %    BASOPHILS 0 0 - 2 %    IMMATURE GRANULOCYTES 1 (H) 0 - 0.5 %    ABS. NEUTROPHILS 8.6 (H) 1.8 - 8.0 K/UL    ABS. LYMPHOCYTES 1.0 0.9 - 3.6 K/UL    ABS. MONOCYTES 0.6 0.05 - 1.2 K/UL    ABS. EOSINOPHILS 0.1 0.0 - 0.4 K/UL    ABS. BASOPHILS 0.0 0.0 - 0.1 K/UL    ABS. IMM.  GRANS. 0.1 (H) 0.00 - 0.04 K/UL    DF AUTOMATED     METABOLIC PANEL, BASIC    Collection Time: 10/24/22 12:25 AM   Result Value Ref Range    Sodium 134 (L) 136 - 145 mmol/L    Potassium 3.2 (L) 3.5 - 5.5 mmol/L    Chloride 97 (L) 100 - 111 mmol/L    CO2 28 21 - 32 mmol/L    Anion gap 9 3.0 - 18.0 mmol/L    Glucose 112 (H) 74 - 99 mg/dL    BUN 14 7 - 18 mg/dL    Creatinine 0.55 (L) 0.60 - 1.30 mg/dL    BUN/Creatinine ratio 25 (H) 12 - 20      eGFR >60 >60 ml/min/1.73m2    Calcium 9.6 8.5 - 10.1 mg/dL           Radiology:     No orders to display Documentation Review:    I have examined the available pertinent past medical records from previous ED visits, hospital admissions, or clinic visits; the information obtained has contributed to PMSHx. The nursing notes for this patient's current visit have been reviewed, and any pertinent information has been incorporated into this note, particularly the vital signs, PMSFSHx, and initial presenting complaint. MEDICAL DECISION MAKING / ED COURSE    Initial EKG was performed and seem to have some artifact or machine. This was repeated and shows a normal sinus rhythm with a heart rate of 92 with no ischemic changes or ectopy. She was monitored on the cardiac monitor with a normal sinus rhythm in the 90s during her entire ED stay. I suspect her abnormal heart rates at home were likely related to the machine not providing correct numbers. Her work-up today shows slight hypokalemia. Will replete orally here. Otherwise lab work is reassuring. She was given a liter of fluids due to her nausea and vomiting. She has been using Zofran with some relief, but will prescribe Phenergan at home to help with the nausea. Will encourage close follow-up with her primary care and oncology teams for further management of her vomiting and cancer. No evidence of acute medical issues today, outpatient management appropriate. Long discussion with patient and son and they are comfortable with the plan today. Meds given in ED:  Medications   potassium chloride (KLOR-CON M10) tablet 40 mEq (has no administration in time range)   sodium chloride 0.9 % bolus infusion 1,000 mL (1,000 mL IntraVENous New Bag 10/24/22 0049)       FINAL DIAGNOSIS:    ICD-10-CM ICD-9-CM    1. Hypokalemia  E87.6 276.8       2.  Nausea and vomiting, unspecified vomiting type  R11.2 787.01             DISPOSITION     Destination: Home    Discharge Instructions:  Please return to the ED for new, different, or worsening symptoms, as well as for any persisting complaints. Read your discharge paperwork carefully and take any prescription medication as directed. Follow up with the healthcare referral provided in the time period specified. In the meantime please contact or return to the ED for any questions or concerns.

## 2022-10-25 DIAGNOSIS — C34.90 NON-SMALL CELL LUNG CANCER, UNSPECIFIED LATERALITY (HCC): Primary | ICD-10-CM

## 2022-10-25 DIAGNOSIS — R11.2 NAUSEA AND VOMITING, UNSPECIFIED VOMITING TYPE: ICD-10-CM

## 2022-10-25 DIAGNOSIS — R53.1 WEAKNESS GENERALIZED: ICD-10-CM

## 2022-10-25 RX ORDER — DEXAMETHASONE 4 MG/1
TABLET ORAL
Qty: 30 TABLET | Refills: 1 | Status: SHIPPED | OUTPATIENT
Start: 2022-10-25

## 2022-10-25 NOTE — TELEPHONE ENCOUNTER
I spoke with Dr. Carlos Augustin (patient's oncologist) this morning about patients condition, worsening intractable nausea and vomiting. Dr. Carlos Augustin will be ordering some additional scans for patient to have done prior to her next apt. She also recommended Decadron 4mg 2-3 times daily for nausea/vomiting until patients next apt with oncology. I have ordered the Decadron and spoken with patient, she is in agreement of the plan.     ALCON Mike

## 2022-10-28 ENCOUNTER — TELEPHONE (OUTPATIENT)
Dept: FAMILY MEDICINE CLINIC | Age: 67
End: 2022-10-28

## 2022-10-28 RX ORDER — BENZONATATE 100 MG/1
100 CAPSULE ORAL
Qty: 60 CAPSULE | Refills: 2 | Status: SHIPPED | OUTPATIENT
Start: 2022-10-28

## 2022-10-28 RX ORDER — BUDESONIDE AND FORMOTEROL FUMARATE DIHYDRATE 160; 4.5 UG/1; UG/1
1 AEROSOL RESPIRATORY (INHALATION) 2 TIMES DAILY
Qty: 1 EACH | Refills: 2 | Status: SHIPPED | OUTPATIENT
Start: 2022-10-28

## 2022-10-28 NOTE — TELEPHONE ENCOUNTER
Called patient to discuss how she is doing with new medication. Patient stated she has been doing better with nausea. She had to get up early to go to [facility] for an MRI and didn't have a chance to take her medicine. She was having some trouble getting the MRI done this morning because she was laying flat and was coughing too much. Patient had questions about home health. I told her all of her information was sent yesterday and she should hear from them within the next week. Patient also stated she believes she has a bed sore on her buttocks that she would like Dr. Bettye Morales to look at on Monday. She also has started to have a cough. Lastly patient stated that she is not dealing with the [facility] anymore and is switching to [facility], but she has a few prescriptions she had refills on that need to be sent to [facility]. Medications are:  Ondansetron  Symbicort  Tessalon    I spoke with patient about not needing to send ondansetron because of the promethazine prescription. I told patient I would ask about Symbicort and Tessalon. If you would like these sent let me know and I can refill and call the patient and let her know.

## 2022-10-28 NOTE — TELEPHONE ENCOUNTER
----- Message from Emir Osborn MD sent at 10/25/2022  1:11 PM EDT -----  Pamela Hoang,     Would you mind giving patient a call just to see how she is doing with the new medication for nausea/vomiting? Has she heard from oncology about additional scans? Thanks!     Jaja Gagnon

## 2022-10-31 ENCOUNTER — OFFICE VISIT (OUTPATIENT)
Dept: FAMILY MEDICINE CLINIC | Age: 67
End: 2022-10-31
Payer: MEDICARE

## 2022-10-31 ENCOUNTER — HOSPITAL ENCOUNTER (OUTPATIENT)
Dept: LAB | Age: 67
Discharge: HOME | End: 2022-10-31

## 2022-10-31 VITALS
OXYGEN SATURATION: 98 % | RESPIRATION RATE: 20 BRPM | WEIGHT: 155.2 LBS | TEMPERATURE: 96.8 F | SYSTOLIC BLOOD PRESSURE: 101 MMHG | BODY MASS INDEX: 24.94 KG/M2 | HEIGHT: 66 IN | DIASTOLIC BLOOD PRESSURE: 60 MMHG | HEART RATE: 100 BPM

## 2022-10-31 DIAGNOSIS — Z13.39 SCREENING FOR ALCOHOLISM: ICD-10-CM

## 2022-10-31 DIAGNOSIS — E87.6 HYPOKALEMIA: ICD-10-CM

## 2022-10-31 DIAGNOSIS — R60.0 EDEMA OF UPPER EXTREMITY: ICD-10-CM

## 2022-10-31 DIAGNOSIS — Z13.31 SCREENING FOR DEPRESSION: ICD-10-CM

## 2022-10-31 DIAGNOSIS — Z00.00 MEDICARE ANNUAL WELLNESS VISIT, SUBSEQUENT: Primary | ICD-10-CM

## 2022-10-31 DIAGNOSIS — R11.2 NAUSEA AND VOMITING, UNSPECIFIED VOMITING TYPE: ICD-10-CM

## 2022-10-31 DIAGNOSIS — C34.90 NON-SMALL CELL LUNG CANCER, UNSPECIFIED LATERALITY (HCC): ICD-10-CM

## 2022-10-31 DIAGNOSIS — L98.429 STAGE 2 SKIN ULCER OF SACRAL REGION (HCC): ICD-10-CM

## 2022-10-31 DIAGNOSIS — J44.9 CHRONIC OBSTRUCTIVE PULMONARY DISEASE, UNSPECIFIED COPD TYPE (HCC): ICD-10-CM

## 2022-10-31 PROCEDURE — G0439 PPPS, SUBSEQ VISIT: HCPCS | Performed by: STUDENT IN AN ORGANIZED HEALTH CARE EDUCATION/TRAINING PROGRAM

## 2022-10-31 PROCEDURE — 1123F ACP DISCUSS/DSCN MKR DOCD: CPT | Performed by: STUDENT IN AN ORGANIZED HEALTH CARE EDUCATION/TRAINING PROGRAM

## 2022-10-31 PROCEDURE — 99001 SPECIMEN HANDLING PT-LAB: CPT

## 2022-10-31 PROCEDURE — 99214 OFFICE O/P EST MOD 30 MIN: CPT | Performed by: STUDENT IN AN ORGANIZED HEALTH CARE EDUCATION/TRAINING PROGRAM

## 2022-10-31 RX ORDER — FERROUS SULFATE TAB 325 MG (65 MG ELEMENTAL FE) 325 (65 FE) MG
TAB ORAL
COMMUNITY
Start: 2022-10-20

## 2022-10-31 NOTE — PATIENT INSTRUCTIONS
Medicare Wellness Visit, Female     The best way to live healthy is to have a lifestyle where you eat a well-balanced diet, exercise regularly, limit alcohol use, and quit all forms of tobacco/nicotine, if applicable. Regular preventive services are another way to keep healthy. Preventive services (vaccines, screening tests, monitoring & exams) can help personalize your care plan, which helps you manage your own care. Screening tests can find health problems at the earliest stages, when they are easiest to treat. [facility] follows the current, evidence-based guidelines published by the [facility]aguir[facility] ([facility]) when recommending preventive services for our patients. Because we follow these guidelines, sometimes recommendations change over time as research supports it. (For example, mammograms used to be recommended annually. Even though Medicare will still pay for an annual mammogram, the newer guidelines recommend a mammogram every two years for women of average risk). Of course, you and your doctor may decide to screen more often for some diseases, based on your risk and your co-morbidities (chronic disease you are already diagnosed with). Preventive services for you include:  - Medicare offers their members a free annual wellness visit, which is time for you and your primary care provider to discuss and plan for your preventive service needs. Take advantage of this benefit every year!  -All adults over the age of 72 should receive the recommended pneumonia vaccines. Current USPSTF guidelines recommend a series of two vaccines for the best pneumonia protection.   -All adults should have a flu vaccine yearly and a tetanus vaccine every 10 years.   -All adults age 48 and older should receive the shingles vaccines (series of two vaccines).       -All adults age 38-68 who are overweight should have a diabetes screening test once every three years.   -All adults born between [birth year] and 1964 should be screened once for Hepatitis C.  -Other screening tests and preventive services for persons with diabetes include: an eye exam to screen for diabetic retinopathy, a kidney function test, a foot exam, and stricter control over your cholesterol.   -Cardiovascular screening for adults with routine risk involves an electrocardiogram (ECG) at intervals determined by your doctor.   -Colorectal cancer screenings should be done for adults age 54-65 with no increased risk factors for colorectal cancer. There are a number of acceptable methods of screening for this type of cancer. Each test has its own benefits and drawbacks. Discuss with your doctor what is most appropriate for you during your annual wellness visit. The different tests include: colonoscopy (considered the best screening method), a fecal occult blood test, a fecal DNA test, and sigmoidoscopy.    -A bone mass density test is recommended when a woman turns 65 to screen for osteoporosis. This test is only recommended one time, as a screening. Some providers will use this same test as a disease monitoring tool if you already have osteoporosis. -Breast cancer screenings are recommended every other year for women of normal risk, age 54-69.  -Cervical cancer screenings for women over age 72 are only recommended with certain risk factors.      Here is a list of your current Health Maintenance items (your personalized list of preventive services) with a due date:  Health Maintenance Due   Topic Date Due    Hepatitis C Test  Never done    Pneumococcal Vaccine (1 - PCV) Never done    DTaP/Tdap/Td  (1 - Tdap) Never done    Cholesterol Test   Never done    Colorectal Screening  Never done    Shingles Vaccine (1 of 2) Never done    Smoker or Former Smoker - [address]  Never done    Bone Mineral Density   Never done    COVID-19 Vaccine (4 - Booster for CarePayment series) 06/05/2022    Yearly Flu Vaccine (1) 08/01/2022    Annual Well Visit  09/21/2022

## 2022-10-31 NOTE — PROGRESS NOTES
Chronic Disease Follow up    Jodi Dumont (: 1955) is a 78 y.o. female here for evaluation of the following chief concerns(s):  Follow-up (1 week follow up- bed sores on buttocks and possibly left elbow) and Annual Wellness Visit (Medicare wellness)       ASSESSMENT/PLAN:  1. Medicare annual wellness visit, subsequent  2. Screening for alcoholism  -     [place] ANNUAL ALCOHOL SCREEN 15 MIN  3. Screening for depression  -     DEPRESSION SCREEN ANNUAL  4. Hypokalemia  -     METABOLIC PANEL, BASIC  -     MAGNESIUM  5. Stage 2 skin ulcer of sacral region ([facility]  6. Nausea and vomiting, unspecified vomiting type  7. Chronic obstructive pulmonary disease, unspecified COPD type ([facility]  8. Non-small cell lung cancer, unspecified laterality ([facility] [address]  -     REFERRAL TO RADIATION ONCOLOGY  9. Edema of upper extremity    Orders Placed This Encounter    ANNUAL DEPRESSION SCREEN 6-64 MIN    METABOLIC PANEL, BASIC    MAGNESIUM    EMPL [facility]     Referral Priority:   Routine     Referral Type:   Consultation     Referral Reason:   Specialty Services Required     Number of Visits Requested:   1    ANNUAL ALCOHOL SCREEN 8-15 MIN    FeroSuL 325 mg (65 mg iron) tablet     Sig: TAKE 1 TABLET BY MOUTH DAILY WITH VITAMIN 500MG     Overall patient has improved since starting Decadron. I am glad that she is now able to tolerate p.o. and has somewhat of an appetite. She is seeing vascular surgery next week which is wonderful, also has follow-up with oncology and follow-up scan scheduled. Patient has requested a second opinion from another oncology group, I have placed a referral and spoken with Hayden Lepe about it. She does have what looks like a small stage II pressure ulcer on her buttocks, I have asked that she have home health take a look at it. Otherwise I recommend a pressure relieving foam seat cushion, change positions much as possible. I will get a repeat BMP given patient's hypokalemia.   Follow-up in 1 month, sooner as needed. Return in about 1 month (around 11/30/2022). Loida AGRAWALSugey Miriam Ellis agrees with plan as above and has no additional questions at this time. SUBJECTIVE/OBJECTIVE:  Patient presents for follow-up of nausea vomiting, Medicare wellness visit. Patient has improved some since starting the Decadron. She has been able to tolerate some p.o. she also feels like the swelling in her right breast has been significantly reduced. She has lost 8 pounds since her visit last week-she thinks this has come from losing some of the fluid on the right side. Patient was scheduled for an appointment for MRI last week, she did not tolerate lying flat and was claustrophobic so could not have it done completely. She is scheduled to have a CT of the chest next week. She is scheduled to see her oncologist in 2 weeks. Patient is unhappy with the office staff at her current oncology office, she states her son wants her to ask about getting a second opinion for her cancer treatment. Patient also has an appointment next week with the vascular surgeon who put the stent in her subclavian vein a few months ago. Patient does tell me about a sore area on her buttocks and between her gluteal crease. She does not think anything is open . No noticeable drainage. She has been putting A&D cream on there. She thinks it is from sitting so much, she really does not move around much in her house because of her shortness of breath and fatigue. Denies fever/chills, chest pain,  abdominal pain, leg swelling. She endorses continued SOB.     Past Medical History:   Diagnosis Date    Abnormal CXR     Arthritis     Bronchitis     Cancer ([address]     RT lung    Chronic obstructive pulmonary disease ([address]     COVID-19     Menopause      Past Surgical History:   Procedure Laterality Date    HX HYSTERECTOMY  1987    TOSHA due to endometrosis, at age 28    HX OOPHORECTOMY       Family History   Problem Relation Age of Onset    Heart Disease Mother     Diabetes Father     Cancer Father         Merkel cell in his hand to lymph nodes    COPD Brother     Emphysema Brother     Uterine Cancer Maternal Aunt     Colon Cancer Maternal Uncle     Cancer Paternal Aunt         brain    Cancer Paternal Uncle         Brain tumor       Social History     Socioeconomic History    Marital status:    Tobacco Use    Smoking status: Every Day     Packs/day: 0.50     Years: 40.00     Pack years: 20.00     Types: Cigarettes    Smokeless tobacco: Never   Vaping Use    Vaping Use: Never used   Substance and Sexual Activity    Alcohol use: Yes     Comment: seldom    Drug use: Not Currently     Types: Marijuana     Social Determinants of Health     Financial Resource Strain: Low Risk     Difficulty of Paying Living Expenses: Not hard at all   Food Insecurity: No Food Insecurity    Worried About [employer] [employer] in the Last Year: Never true    [address] [employer] in the Last Year: Never true   Transportation Needs: No Transportation Needs    Lack of Transportation (Medical): No    Lack of Transportation (Non-Medical): No   Housing Stability: Low Risk     Unable to Pay for Housing in the Last Year: No    Number of [place] in the Last Year: 1    Unstable Housing in the Last Year: No     Social History     Tobacco Use   Smoking Status Every Day    Packs/day: 0.50    Years: 40.00    Pack years: 20.00    Types: Cigarettes   Smokeless Tobacco Never       Current Outpatient Medications   Medication Sig Dispense Refill    FeroSuL 325 mg (65 mg iron) tablet TAKE 1 TABLET BY MOUTH DAILY WITH VITAMIN 500MG      budesonide-formoteroL (Symbicort) 160-4.5 mcg/actuation HFAA Take 1 Puff by inhalation two (2) times a day. 1 Each 2    benzonatate (TESSALON) 100 mg capsule Take 1 Capsule by mouth two (2) times daily as needed for Cough. 60 Capsule 2    dexAMETHasone (DECADRON) 4 mg tablet Take 1 tablet by mouth twice daily for nausea and vomiting. Do not stop abruptly.  30 Tablet 1    promethazine (PHENERGAN) 25 mg tablet Take 1 Tablet by mouth every six (6) hours as needed for Nausea. 12 Tablet 0    loratadine (CLARITIN) 10 mg tablet Take 10 mg by mouth daily. famotidine (PEPCID) 20 mg tablet       ibuprofen (MOTRIN) 200 mg tablet Take 800 mg by mouth as needed for Pain. Taken with food      albuterol (PROVENTIL HFA, VENTOLIN HFA, PROAIR HFA) 90 mcg/actuation inhaler Take 2 Puffs by inhalation every six (6) hours as needed for Wheezing. 1 Each 5     Allergies   Allergen Reactions    Iodine And Iodide Containing Products Hives    Olanzapine Other (comments)       /60 (BP 1 Location: Left upper arm, BP Patient Position: Sitting, BP Cuff Size: Large adult)   Pulse 100   Temp 96.8 °F (36 °C) (Temporal)   Resp 20   Ht 5' 6\" (1.676 m)   Wt 155 lb 3.2 oz (70.4 kg)   SpO2 98%   BMI 25.05 kg/m²     Gen: NAD, well appearing   Heart: RRR, no m/g/r  Lungs: CTA bilaterally, breathing comfortably  Abd: Soft, non tender to palpation  Ext: No swelling  Skin: Non blanchable area of erythema between gluteal crease. No open areas, no drainage. Psych: cooperative. Appropriate mood and affect. Lab Results   Component Value Date/Time    WBC 10.4 10/24/2022 12:25 AM    HGB 10.7 (L) 10/24/2022 12:25 AM    HCT 32.3 (L) 10/24/2022 12:25 AM    PLATELET 138 (H) 50/16/4691 12:25 AM    MCV 87.5 10/24/2022 12:25 AM     Lab Results   Component Value Date/Time    Sodium 134 (L) 10/24/2022 12:25 AM    Potassium 3.2 (L) 10/24/2022 12:25 AM    Chloride 97 (L) 10/24/2022 12:25 AM    CO2 28 10/24/2022 12:25 AM    Anion gap 9 10/24/2022 12:25 AM    Glucose 112 (H) 10/24/2022 12:25 AM    BUN 14 10/24/2022 12:25 AM    Creatinine 0.55 (L) 10/24/2022 12:25 AM    BUN/Creatinine ratio 25 (H) 10/24/2022 12:25 AM    GFR est AA >60 08/11/2021 10:15 AM    GFR est non-AA >60 08/11/2021 10:15 AM    Calcium 9.6 10/24/2022 12:25 AM    Bilirubin, total 0.5 10/22/2022 12:40 AM    Alk.  phosphatase 87 10/22/2022 12:40 AM Protein, total 6.5 10/22/2022 12:40 AM    Albumin 2.8 (L) 10/22/2022 12:40 AM    Globulin 3.7 10/22/2022 12:40 AM    A-G Ratio 0.8 10/22/2022 12:40 AM    ALT (SGPT) 13 10/22/2022 12:40 AM    AST (SGOT) 7 (L) 10/22/2022 12:40 AM     No results found for: CHOL, CHOLPOCT, CHOLX, CHLST, CHOLV, TOTCHOLEXT, HDL, HDLPOC, HDLEXT, HDLP, LDL, LDLCPOC, LDLCEXT, LDLC, DLDLP, VLDLC, VLDL, TGLX, TRIGL, TRIGLYCEXT, TRIGP, TGLPOCT, CHHD, CHHDX    On this date 10/31/22 I have spent 30 minutes reviewing previous notes, test results and face to face with the patient for interview/exam, discussing working diagnosis and treatment plan as well as documenting on the day of the visit. Medical decision making complexity: moderate-high    I have discussed the diagnosis with the patient and the intended plan as seen in the above orders. The patient has received an after-visit summary and questions were answered concerning future plans. I have discussed medication side effects and warnings with the patient as well. I have reviewed the plan of care with the patient, accepted their input and they are in agreement with the treatment goals. Previous lab and imaging results were reviewed by me.      Jose Medina MD   Family Medicine

## 2022-10-31 NOTE — PROGRESS NOTES
This is the Subsequent Medicare Annual Wellness Exam, performed 12 months or more after the Initial AWV or the last Subsequent AWV    I have reviewed the patient's medical history in detail and updated the computerized patient record. Assessment/Plan   Education and counseling provided:  Are appropriate based on today's review and evaluation    1. Medicare annual wellness visit, subsequent  2. Screening for alcoholism  -     [place] ANNUAL ALCOHOL SCREEN 15 MIN  3. Screening for depression  -     DEPRESSION SCREEN ANNUAL  4. Hypokalemia  -     METABOLIC PANEL, BASIC  -     MAGNESIUM  5. Stage 2 skin ulcer of sacral region ([facility]  6. Nausea and vomiting, unspecified vomiting type  7. Chronic obstructive pulmonary disease, unspecified COPD type ([facility]  8. Non-small cell lung cancer, unspecified laterality ([facility]  9. Edema of upper extremity       Depression Risk Factor Screening     3 most recent PHQ Screens 10/31/2022   Little interest or pleasure in doing things Not at all   Feeling down, depressed, irritable, or hopeless Not at all   Total Score PHQ 2 0       Alcohol & Drug Abuse Risk Screen    Do you average more than 1 drink per night or more than 7 drinks a week:  No    On any one occasion in the past three months have you have had more than 3 drinks containing alcohol:  No          Functional Ability and Level of Safety    Hearing: Hearing is good. Activities of Daily Living: The home contains: handrails  Patient needs help with:  transportation      Ambulation: with difficulty, uses a walker     Fall Risk:  Fall Risk Assessment, last 12 mths 10/31/2022   Able to walk? Yes   Fall in past 12 months? 1   Do you feel unsteady? 0   Are you worried about falling 0   Is TUG test greater than 12 seconds? 0   Is the gait abnormal? 0   Number of falls in past 12 months 2   Fall with injury?  0      Abuse Screen:  Patient is not abused       Cognitive Screening    Has your family/caregiver stated any concerns about your memory: no     Cognitive Screening: Normal - Mini Cog Test    Health Maintenance Due     Health Maintenance Due   Topic Date Due    Hepatitis C Screening  Never done    Pneumococcal 65+ years (1 - PCV) Never done    DTaP/Tdap/Td series (1 - Tdap) Never done    Lipid Screen  Never done    Colorectal Cancer Screening Combo  Never done    Shingrix Vaccine Age 50> (1 of 2) Never done    Low dose CT lung screening  Never done    Bone Densitometry (Dexa) Screening  Never done    COVID-19 Vaccine (4 - Booster for Pfizer series) 06/05/2022    Flu Vaccine (1) 08/01/2022    Medicare Yearly Exam  09/21/2022       Patient Care Team   Patient Care Team:  Alonza Leyden, MD as PCP - General (Family Medicine)  Alonza Leyden, MD as PCP - [address] [place] [address] Provider  Abelardo Gilford, DO (Pulmonary Disease)  Gladys Child MD (Hematology and Oncology)    History     Patient Active Problem List   Diagnosis Code    Chronic obstructive pulmonary disease ([facility] J44.9    Non-small cell lung cancer ([facility] [address] C34.90    Dependence on nicotine from cigarettes F17.210    Acid reflux K21.9    Swelling of face R22.0    Edema of upper extremity R60.0    Iron deficiency E61.1    Pain due to neoplasm G89.3    Hypokalemia E87.6    Nausea and vomiting R11.2    Stage 2 skin ulcer of sacral region [facility]) L98.429     Past Medical History:   Diagnosis Date    Abnormal CXR     Arthritis     Bronchitis     Cancer ([facility] [address]     RT lung    Chronic obstructive pulmonary disease ([facility] [address]     COVID-19     Menopause       Past Surgical History:   Procedure Laterality Date    HX HYSTERECTOMY  1987    TOSHA due to endometrosis, at age 28    HX OOPHORECTOMY       Current Outpatient Medications   Medication Sig Dispense Refill    FeroSuL 325 mg (65 mg iron) tablet TAKE 1 TABLET BY MOUTH DAILY WITH VITAMIN 500MG      budesonide-formoteroL (Symbicort) 160-4.5 mcg/actuation HFAA Take 1 Puff by inhalation two (2) times a day.  1 Each 2    benzonatate (TESSALON) 100 mg capsule Take 1 Capsule by mouth two (2) times daily as needed for Cough. 60 Capsule 2    dexAMETHasone (DECADRON) 4 mg tablet Take 1 tablet by mouth twice daily for nausea and vomiting. Do not stop abruptly. 30 Tablet 1    promethazine (PHENERGAN) 25 mg tablet Take 1 Tablet by mouth every six (6) hours as needed for Nausea. 12 Tablet 0    loratadine (CLARITIN) 10 mg tablet Take 10 mg by mouth daily. famotidine (PEPCID) 20 mg tablet       ibuprofen (MOTRIN) 200 mg tablet Take 800 mg by mouth as needed for Pain. Taken with food      albuterol (PROVENTIL HFA, VENTOLIN HFA, PROAIR HFA) 90 mcg/actuation inhaler Take 2 Puffs by inhalation every six (6) hours as needed for Wheezing. 1 Each 5     Allergies   Allergen Reactions    Iodine And Iodide Containing Products Hives    Olanzapine Other (comments)       Family History   Problem Relation Age of Onset    Heart Disease Mother     Diabetes Father     Cancer Father         Merkel cell in his hand to lymph nodes    COPD Brother     Emphysema Brother     Uterine Cancer Maternal Aunt     Colon Cancer Maternal Uncle     Cancer Paternal Aunt         brain    Cancer Paternal Uncle         Brain tumor     Social History     Tobacco Use    Smoking status: Every Day     Packs/day: 0.50     Years: 40.00     Pack years: 20.00     Types: Cigarettes    Smokeless tobacco: Never   Substance Use Topics    Alcohol use: Yes     Comment: seldom         Venkata Duarte LPN     I have seen patient independently and reviewed/agree with note per above. Patient does not have any formal advance directives. Patient has named a healthcare decision make which has been updated in the chart. Patient was encouraged to discuss wishes with named healthcare decision maker. Consider formal advance directives. Patient currently has active cancer and several concerns related to this. Will hold off on additional cancer screening until her condition stabilizes.      Britney Ann MD

## 2022-10-31 NOTE — PROGRESS NOTES
Jamal Matt presents today for   Chief Complaint   Patient presents with    Follow-up     1 week follow up- bed sores on buttocks and possibly left elbow    Annual Wellness Visit     Medicare wellness       Is someone accompanying this pt? nio    Is the patient using any DME equipment during [address]? walker    Depression Screening:  3 most recent PHQ Screens 10/31/2022   Little interest or pleasure in doing things Not at all   Feeling down, depressed, irritable, or hopeless Not at all   Total Score PHQ 2 0       Learning Assessment:  Learning Assessment 9/10/2021   PRIMARY LEARNER Patient   PRIMARY LANGUAGE ENGLISH   LEARNER PREFERENCE PRIMARY DEMONSTRATION   ANSWERED BY Patient   RELATIONSHIP SELF       Fall Risk  Fall Risk Assessment, last 12 mths 10/31/2022   Able to walk? Yes   Fall in past 12 months? 1   Do you feel unsteady? 0   Are you worried about falling 0   Is TUG test greater than 12 seconds? 0   Is the gait abnormal? 0   Number of falls in past 12 months 2   Fall with injury? 0       ADL  ADL Assessment 10/31/2022   Feeding yourself No Help Needed   Getting from bed to chair No Help Needed   Getting dressed No Help Needed   Bathing or showering No Help Needed   Walk across the room (includes cane/walker) Help Needed   Using the telphone No Help Needed   Taking your medications No Help Needed   Preparing meals No Help Needed   Managing money (expenses/bills) No Help Needed   Moderately strenuous housework (laundry) No Help Needed   Shopping for personal items (toiletries/medicines) No Help Needed   Shopping for groceries No Help Needed   Driving Help Needed   Climbing a flight of stairs No Help Needed   Getting to places beyond walking distances No Help Needed       Health Maintenance reviewed and discussed and ordered per Provider.     Health Maintenance Due   Topic Date Due    Hepatitis C Screening  Never done    Pneumococcal 65+ years (1 - PCV) Never done    DTaP/Tdap/Td series (1 - Tdap) Never done Lipid Screen  Never done    Colorectal Cancer Screening Combo  Never done    Shingrix Vaccine Age 50> (1 of 2) Never done    Low dose CT lung screening  Never done    Bone Densitometry (Dexa) Screening  Never done    COVID-19 Vaccine (4 - Booster for Pfizer series) 06/05/2022    Flu Vaccine (1) 08/01/2022    Medicare Yearly Exam  09/21/2022   . Coordination of Care:  1. \"Have you been to the ER, urgent care clinic since your last visit? Hospitalized since your last visit? \" Yes Where: [facility] ER    2. \"Have you seen or consulted any other health care providers outside of the [address] [place] [address] since your last visit? \" No     3. For patients aged 39-70: Has the patient had a colonoscopy? No     If the patient is female:    4. For patients aged 41-77: Has the patient had a mammogram within the past 2 years? Yes - no Care Gap present    5. For patients aged 21-65: Has the patient had a pap smear?  NA - based on age

## 2022-11-01 ENCOUNTER — PATIENT OUTREACH (OUTPATIENT)
Dept: CASE MANAGEMENT | Age: 67
End: 2022-11-01

## 2022-11-01 ENCOUNTER — TELEPHONE (OUTPATIENT)
Dept: FAMILY MEDICINE CLINIC | Age: 67
End: 2022-11-01

## 2022-11-01 LAB
BUN SERPL-MCNC: 17 MG/DL (ref 8–27)
BUN/CREAT SERPL: 30 (ref 12–28)
CALCIUM SERPL-MCNC: 9.4 MG/DL (ref 8.7–10.3)
CHLORIDE SERPL-SCNC: 94 MMOL/L (ref 96–106)
CO2 SERPL-SCNC: 23 MMOL/L (ref 20–29)
CREAT SERPL-MCNC: 0.56 MG/DL (ref 0.57–1)
EGFR: 101 ML/MIN/1.73
GLUCOSE SERPL-MCNC: 108 MG/DL (ref 70–99)
POTASSIUM SERPL-SCNC: 4 MMOL/L (ref 3.5–5.2)
SODIUM SERPL-SCNC: 132 MMOL/L (ref 134–144)

## 2022-11-01 NOTE — TELEPHONE ENCOUNTER
----- Message from Mor Lopez MD sent at 11/1/2022  7:43 AM EDT -----  Bernabe Miranda,     Would you mind giving Mrs. Brunilda Chandler a call and telling her that her potassium looks good- no more potassium replacement required for right now. Thanks so much!     Allison Johnson  ----- Message -----  From: [facility] Lab Results In  Sent: 11/1/2022   7:37 AM EDT  To: Mor Lopez MD

## 2022-11-01 NOTE — PROGRESS NOTES
Date/Time:  2022 3:14 PM    Method of communication with patient:phone    [address] [place] [address] ([facility]) contacted the patient by telephone to perform Ambulatory Care Coordination. Verified name and  (PHI) with patient as identifiers. Provided introduction to self, and explanation of the Ambulatory Care Manager's role. Encounter Note:   Patient enrolled in Complex Case Management effective 2022 and will be followed per [facility] protocol. Initial questions answered with subsequent encounters planned. Reviewed upcoming appointments - verified that patient could attend appointments  Further questions answered as needed, patient has [facility] contact information  Shows understanding of medication therapy and importance of following therapy as prescribed. Reviewed status of supply and any refills needed. Questions answered as needed. Discussed ways to assure meds are taken on time each day. Use of labeled dispensers, etc. No change in med therapy since PCP visit 10-31-22  Depression screen done - PHQ2 score -  0  Respiratory and cardiac status shows no issues at present     Reviewed most recent clinic visit w/ patient who verbalized understanding. Patient given an opportunity to ask questions. Notes / Challenges    NA       The patient agrees to contact the PCP office or the [address] [place] [address] for questions related to their healthcare. Provided contact information for future reference. Disease Specific:   COPD    Home Health Active: Yes    DME Active: Yes    Barriers to care?  depression, lack of knowledge about disease, medication management, transportation    ACP Decision maker    Primary Decision Maker (Active): Patel Oliveros - [phone]  Advance Care Planning 10/12/2022   Patient's Robbin Du is: Legal Next of [facility] Directive None   Patient Would Like to Complete Advance Directive No         Medication(s):   Medication reconciliation was performed with patient, who verbalizes understanding of administration of home medications. There were no barriers to obtaining medications identified at this time. Current Outpatient Medications   Medication Sig    FeroSuL 325 mg (65 mg iron) tablet TAKE 1 TABLET BY MOUTH DAILY WITH VITAMIN 500MG    budesonide-formoteroL (Symbicort) 160-4.5 mcg/actuation HFAA Take 1 Puff by inhalation two (2) times a day. benzonatate (TESSALON) 100 mg capsule Take 1 Capsule by mouth two (2) times daily as needed for Cough. dexAMETHasone (DECADRON) 4 mg tablet Take 1 tablet by mouth twice daily for nausea and vomiting. Do not stop abruptly. promethazine (PHENERGAN) 25 mg tablet Take 1 Tablet by mouth every six (6) hours as needed for Nausea.    loratadine (CLARITIN) 10 mg tablet Take 10 mg by mouth daily. famotidine (PEPCID) 20 mg tablet     ibuprofen (MOTRIN) 200 mg tablet Take 800 mg by mouth as needed for Pain. Taken with food    albuterol (PROVENTIL HFA, VENTOLIN HFA, PROAIR HFA) 90 mcg/actuation inhaler Take 2 Puffs by inhalation every six (6) hours as needed for Wheezing. No current facility-administered medications for this visit.        [facility] follow up appointment(s):   Future Appointments   Date Time Provider Yessy Fatima   11/30/2022 10:30 AM Ivory Flores MD [facility]           Goals Addressed                   This Visit's Progress     Attend follow up appointments on schedule   On track     Prepare patients and caregivers for end of life decisions (ie. need for hospice, pain management, symptom relief, advance directives etc.)   On track     Take all medications as ordered   On track

## 2022-11-01 NOTE — TELEPHONE ENCOUNTER
Called patient to speak about message from Connie Cano MD. Spoke with patient about message. Patient expressed understanding.

## 2022-11-02 ENCOUNTER — TELEPHONE (OUTPATIENT)
Dept: FAMILY MEDICINE CLINIC | Age: 67
End: 2022-11-02

## 2022-11-02 NOTE — TELEPHONE ENCOUNTER
Patient called stating [facility] just called her stating she didn't need to come to her appointment scheduled with them on Friday. States they told her the issue had been taken care of at her last visit, but the patient feels like she she does need to go. She also asked had her results been shared with them because she feels thy haven't looked. Patient expressed she is upset with the current situation.       Patients call back info;   [phone]

## 2022-11-03 ENCOUNTER — TELEPHONE (OUTPATIENT)
Dept: FAMILY MEDICINE CLINIC | Age: 67
End: 2022-11-03

## 2022-11-03 DIAGNOSIS — C34.90 NON-SMALL CELL LUNG CANCER, UNSPECIFIED LATERALITY (HCC): Primary | ICD-10-CM

## 2022-11-03 NOTE — TELEPHONE ENCOUNTER
Patient called just wanting you to know they have still went ahead and cancelled her appointment at [address] vascular for tomorrow.

## 2022-11-09 ENCOUNTER — TELEPHONE (OUTPATIENT)
Dept: FAMILY MEDICINE CLINIC | Age: 67
End: 2022-11-09

## 2022-11-09 ENCOUNTER — HOSPITAL ENCOUNTER (EMERGENCY)
Age: 67
Discharge: ANOTHER HOSPITAL | End: 2022-11-09
Attending: INTERNAL MEDICINE
Payer: MEDICARE

## 2022-11-09 ENCOUNTER — APPOINTMENT (OUTPATIENT)
Dept: CT IMAGING | Age: 67
End: 2022-11-09
Attending: INTERNAL MEDICINE
Payer: MEDICARE

## 2022-11-09 VITALS
HEART RATE: 95 BPM | SYSTOLIC BLOOD PRESSURE: 116 MMHG | DIASTOLIC BLOOD PRESSURE: 75 MMHG | OXYGEN SATURATION: 95 % | BODY MASS INDEX: 24.33 KG/M2 | RESPIRATION RATE: 16 BRPM | TEMPERATURE: 98 F | WEIGHT: 155 LBS | HEIGHT: 67 IN

## 2022-11-09 DIAGNOSIS — R53.1 GENERALIZED WEAKNESS: ICD-10-CM

## 2022-11-09 DIAGNOSIS — D72.829 LEUKOCYTOSIS, UNSPECIFIED TYPE: Primary | ICD-10-CM

## 2022-11-09 LAB
ALBUMIN SERPL-MCNC: 3.1 G/DL (ref 3.4–5)
ALBUMIN/GLOB SERPL: 0.9 {RATIO} (ref 0.8–1.7)
ALP SERPL-CCNC: 76 U/L (ref 45–117)
ALT SERPL-CCNC: 44 U/L (ref 13–56)
ANION GAP SERPL CALC-SCNC: 7 MMOL/L (ref 3–18)
APPEARANCE UR: CLEAR
AST SERPL W P-5'-P-CCNC: 16 U/L (ref 10–38)
ATRIAL RATE: 91 BPM
BACTERIA URNS QL MICRO: ABNORMAL /HPF
BASOPHILS # BLD: 0 K/UL (ref 0–0.1)
BASOPHILS NFR BLD: 0 % (ref 0–2)
BILIRUB SERPL-MCNC: 0.5 MG/DL (ref 0.2–1)
BILIRUB UR QL: NEGATIVE
BUN SERPL-MCNC: 32 MG/DL (ref 7–18)
BUN/CREAT SERPL: 71 (ref 12–20)
CA-I BLD-MCNC: 9.2 MG/DL (ref 8.5–10.1)
CALCULATED P AXIS, ECG09: 85 DEGREES
CALCULATED R AXIS, ECG10: 63 DEGREES
CALCULATED T AXIS, ECG11: 72 DEGREES
CHLORIDE SERPL-SCNC: 94 MMOL/L (ref 100–111)
CK SERPL-CCNC: 18 U/L (ref 26–192)
CO2 SERPL-SCNC: 26 MMOL/L (ref 21–32)
COLOR UR: YELLOW
CREAT SERPL-MCNC: 0.45 MG/DL (ref 0.6–1.3)
DIAGNOSIS, 93000: NORMAL
DIFFERENTIAL METHOD BLD: ABNORMAL
EOSINOPHIL # BLD: 0 K/UL (ref 0–0.4)
EOSINOPHIL NFR BLD: 0 % (ref 0–5)
EPITH CASTS URNS QL MICRO: ABNORMAL /LPF (ref 0–20)
ERYTHROCYTE [DISTWIDTH] IN BLOOD BY AUTOMATED COUNT: 16.6 % (ref 11.6–14.5)
GLOBULIN SER CALC-MCNC: 3.4 G/DL (ref 2–4)
GLUCOSE SERPL-MCNC: 133 MG/DL (ref 74–99)
GLUCOSE UR STRIP.AUTO-MCNC: NEGATIVE MG/DL
HCT VFR BLD AUTO: 38.4 % (ref 35–45)
HGB BLD-MCNC: 13.1 G/DL (ref 12–16)
HGB UR QL STRIP: ABNORMAL
IMM GRANULOCYTES # BLD AUTO: 0 K/UL
IMM GRANULOCYTES NFR BLD AUTO: 0 %
KETONES UR QL STRIP.AUTO: NEGATIVE MG/DL
LACTATE SERPL-SCNC: 1.5 MMOL/L (ref 0.4–2)
LEUKOCYTE ESTERASE UR QL STRIP.AUTO: ABNORMAL
LYMPHOCYTES # BLD: 0.8 K/UL (ref 0.9–3.6)
LYMPHOCYTES NFR BLD: 4 % (ref 21–52)
MAGNESIUM SERPL-MCNC: 1.9 MG/DL (ref 1.6–2.6)
MCH RBC QN AUTO: 28.9 PG (ref 24–34)
MCHC RBC AUTO-ENTMCNC: 34.1 G/DL (ref 31–37)
MCV RBC AUTO: 84.6 FL (ref 78–100)
MONOCYTES # BLD: 0.6 K/UL (ref 0.05–1.2)
MONOCYTES NFR BLD: 3 % (ref 3–10)
NEUTS SEG # BLD: 18.3 K/UL (ref 1.8–8)
NEUTS SEG NFR BLD: 93 % (ref 40–73)
NITRITE UR QL STRIP.AUTO: NEGATIVE
NRBC # BLD: 0 K/UL (ref 0–0.01)
NRBC BLD-RTO: 0 PER 100 WBC
P-R INTERVAL, ECG05: 94 MS
PH UR STRIP: 6 [PH] (ref 5–8)
PLATELET # BLD AUTO: 309 K/UL (ref 135–420)
PMV BLD AUTO: 10 FL (ref 9.2–11.8)
POTASSIUM SERPL-SCNC: 4.1 MMOL/L (ref 3.5–5.5)
PROCALCITONIN SERPL-MCNC: 119.63 NG/ML
PROT SERPL-MCNC: 6.5 G/DL (ref 6.4–8.2)
PROT UR STRIP-MCNC: NEGATIVE MG/DL
Q-T INTERVAL, ECG07: 341 MS
QRS DURATION, ECG06: 80 MS
QTC CALCULATION (BEZET), ECG08: 433 MS
RBC # BLD AUTO: 4.54 M/UL (ref 4.2–5.3)
RBC #/AREA URNS HPF: ABNORMAL /HPF (ref 0–2)
RBC MORPH BLD: ABNORMAL
SODIUM SERPL-SCNC: 127 MMOL/L (ref 136–145)
SP GR UR REFRACTOMETRY: 1.02 (ref 1–1.03)
TROPONIN-HIGH SENSITIVITY: 9 NG/L (ref 0–54)
UROBILINOGEN UR QL STRIP.AUTO: 1 EU/DL (ref 0.2–1)
VENTRICULAR RATE, ECG03: 97 BPM
WBC # BLD AUTO: 19.7 K/UL (ref 4.6–13.2)
WBC URNS QL MICRO: ABNORMAL /HPF (ref 0–4)

## 2022-11-09 PROCEDURE — 96365 THER/PROPH/DIAG IV INF INIT: CPT

## 2022-11-09 PROCEDURE — 85025 COMPLETE CBC W/AUTO DIFF WBC: CPT

## 2022-11-09 PROCEDURE — 84145 PROCALCITONIN (PCT): CPT

## 2022-11-09 PROCEDURE — 83605 ASSAY OF LACTIC ACID: CPT

## 2022-11-09 PROCEDURE — 71250 CT THORAX DX C-: CPT

## 2022-11-09 PROCEDURE — 99285 EMERGENCY DEPT VISIT HI MDM: CPT

## 2022-11-09 PROCEDURE — 84484 ASSAY OF TROPONIN QUANT: CPT

## 2022-11-09 PROCEDURE — 74011250636 HC RX REV CODE- 250/636: Performed by: INTERNAL MEDICINE

## 2022-11-09 PROCEDURE — 82550 ASSAY OF CK (CPK): CPT

## 2022-11-09 PROCEDURE — 83735 ASSAY OF MAGNESIUM: CPT

## 2022-11-09 PROCEDURE — 74011000258 HC RX REV CODE- 258

## 2022-11-09 PROCEDURE — 93005 ELECTROCARDIOGRAM TRACING: CPT

## 2022-11-09 PROCEDURE — 96375 TX/PRO/DX INJ NEW DRUG ADDON: CPT

## 2022-11-09 PROCEDURE — 74011250636 HC RX REV CODE- 250/636: Performed by: EMERGENCY MEDICINE

## 2022-11-09 PROCEDURE — 80053 COMPREHEN METABOLIC PANEL: CPT

## 2022-11-09 PROCEDURE — 81001 URINALYSIS AUTO W/SCOPE: CPT

## 2022-11-09 RX ORDER — ONDANSETRON 2 MG/ML
4 INJECTION INTRAMUSCULAR; INTRAVENOUS
Status: COMPLETED | OUTPATIENT
Start: 2022-11-09 | End: 2022-11-09

## 2022-11-09 RX ORDER — DEXAMETHASONE 4 MG/1
4 TABLET ORAL
Status: COMPLETED | OUTPATIENT
Start: 2022-11-09 | End: 2022-11-09

## 2022-11-09 RX ORDER — ASPIRIN 81 MG/1
81 TABLET ORAL DAILY
COMMUNITY

## 2022-11-09 RX ORDER — CEFTRIAXONE 1 G/1
1 INJECTION, POWDER, FOR SOLUTION INTRAMUSCULAR; INTRAVENOUS
Status: COMPLETED | OUTPATIENT
Start: 2022-11-09 | End: 2022-11-09

## 2022-11-09 RX ORDER — SODIUM CHLORIDE 900 MG/100ML
INJECTION INTRAVENOUS
Status: COMPLETED
Start: 2022-11-09 | End: 2022-11-09

## 2022-11-09 RX ADMIN — SODIUM CHLORIDE 500 ML: 9 INJECTION, SOLUTION INTRAVENOUS at 13:32

## 2022-11-09 RX ADMIN — SODIUM CHLORIDE 50 ML: 900 INJECTION INTRAVENOUS at 19:28

## 2022-11-09 RX ADMIN — DEXAMETHASONE 4 MG: 4 TABLET ORAL at 20:13

## 2022-11-09 RX ADMIN — ONDANSETRON 4 MG: 2 INJECTION INTRAMUSCULAR; INTRAVENOUS at 20:38

## 2022-11-09 RX ADMIN — CEFTRIAXONE 1 G: 1 INJECTION, POWDER, FOR SOLUTION INTRAMUSCULAR; INTRAVENOUS at 19:28

## 2022-11-09 RX ADMIN — AZITHROMYCIN 500 MG: 500 INJECTION, POWDER, LYOPHILIZED, FOR SOLUTION INTRAVENOUS at 20:13

## 2022-11-09 NOTE — TELEPHONE ENCOUNTER
Called patient to talk about message. She states she is so weak she can't get out of her chair and needed help getting off the toilet yesterday. Home health nurse was there yesterday and her vitals were normal, wasn't as weak yesterday but it has gotten a lot worse in the past 24 hours. Spoke with Dr. Steven Bernard about what is going on and she agreed the patient should head over to the hospital. I relayed this information to Mrs. Alvarez Olivera and she agreed, she stated she can't live by herself anymore, she doesn't have the strength. I told her maybe she could talk to her son about moving in with him or they could look for an assisted living facility. Mrs. Alvarez Olivera agreed, she stated she was going to call EMS to come get her and take her to the hospital and then she was going to call her son to give him an update and they would talk about it. I relayed all information back to Dr. Steven Bernard.

## 2022-11-09 NOTE — ED PROVIDER NOTES
EMERGENCY DEPARTMENT HISTORY AND PHYSICAL EXAM      Date: 2022  Patient Name: Madhuri Sutherland    History of Presenting Illness     Chief Complaint   Patient presents with    Fatigue       History Provided By: Patient    HPI: Madhuri Sutherland, 78 y.o. female history of COVID-19, hysterectomy, COPD. Patient has history of non-small cell cancer of the right lung. Patient states that she is received radiation as well as chemotherapy by Dr. Samuel Monet. She states that she had right supraclavicular vein compression and developed a superior vena cava syndrome and was treated with radiation and dexamethasone which she is still on now. Patient states that she had a stent placed by Dr. Laura Luis that the vein collapsed. She presents with generalized weakness. States that she cannot get up on her own. She lives alone her   last year. She states that she cannont live alone; she called her pcp and was told to come to the ER for placement. There are no other complaints, changes, or physical findings at this time. PCP: Indiana Gaitan MD    No current facility-administered medications on file prior to encounter. Current Outpatient Medications on File Prior to Encounter   Medication Sig Dispense Refill    aspirin delayed-release 81 mg tablet Take 81 mg by mouth daily. FeroSuL 325 mg (65 mg iron) tablet TAKE 1 TABLET BY MOUTH DAILY WITH VITAMIN 500MG      budesonide-formoteroL (Symbicort) 160-4.5 mcg/actuation HFAA Take 1 Puff by inhalation two (2) times a day. 1 Each 2    benzonatate (TESSALON) 100 mg capsule Take 1 Capsule by mouth two (2) times daily as needed for Cough. 60 Capsule 2    dexAMETHasone (DECADRON) 4 mg tablet Take 1 tablet by mouth twice daily for nausea and vomiting. Do not stop abruptly. 30 Tablet 1    loratadine (CLARITIN) 10 mg tablet Take 10 mg by mouth daily. famotidine (PEPCID) 20 mg tablet       ibuprofen (MOTRIN) 200 mg tablet Take 800 mg by mouth as needed for Pain. Taken with food      albuterol (PROVENTIL HFA, VENTOLIN HFA, PROAIR HFA) 90 mcg/actuation inhaler Take 2 Puffs by inhalation every six (6) hours as needed for Wheezing. 1 Each 5    promethazine (PHENERGAN) 25 mg tablet Take 1 Tablet by mouth every six (6) hours as needed for Nausea. (Patient not taking: Reported on 11/9/2022) 12 Tablet 0       Past History     Past Medical History:  Past Medical History:   Diagnosis Date    Abnormal CXR     Arthritis     Bronchitis     Cancer ([facility] [address]     RT lung    Chronic obstructive pulmonary disease ([facility] [address]     COVID-19     Menopause        Past Surgical History:  Past Surgical History:   Procedure Laterality Date    HX HYSTERECTOMY  1987    TOSHA due to endometrosis, at age 28    HX OOPHORECTOMY         Family History:  Family History   Problem Relation Age of Onset    Heart Disease Mother     Diabetes Father     Cancer Father         Merkel cell in his hand to lymph nodes    COPD Brother     Emphysema Brother     Uterine Cancer Maternal Aunt     Colon Cancer Maternal Uncle     Cancer Paternal Aunt         brain    Cancer Paternal Uncle         Brain tumor       Social History:  Social History     Tobacco Use    Smoking status: Every Day     Packs/day: 0.50     Years: 40.00     Pack years: 20.00     Types: Cigarettes    Smokeless tobacco: Never   Vaping Use    Vaping Use: Never used   Substance Use Topics    Alcohol use: Yes     Comment: seldom    Drug use: Not Currently     Types: Marijuana       Allergies: Allergies   Allergen Reactions    Iodine And Iodide Containing Products Hives    Olanzapine Other (comments)       Review of Systems   Review of Systems   Constitutional:  Positive for fatigue. Negative for chills and fever. HENT:  Negative for sore throat. Eyes:  Negative for visual disturbance. Respiratory:  Negative for cough and shortness of breath. Cardiovascular:  Negative for chest pain.    Gastrointestinal:  Negative for abdominal pain, diarrhea, nausea and vomiting. Genitourinary:  Negative for dysuria and flank pain. Musculoskeletal:  Negative for arthralgias and myalgias. Skin:  Negative for rash. Neurological:  Positive for weakness. Negative for headaches. Psychiatric/Behavioral:  Negative for confusion. Physical Exam   Physical Exam  Vitals and nursing note reviewed. Constitutional:       General: She is not in acute distress. Appearance: She is well-developed. HENT:      Head: Normocephalic. Mouth/Throat:      Pharynx: No oropharyngeal exudate. Eyes:      General:         Right eye: No discharge. Extraocular Movements: Extraocular movements intact. Conjunctiva/sclera: Conjunctivae normal.   Neck:      Thyroid: No thyromegaly. Vascular: No JVD. Cardiovascular:      Rate and Rhythm: Normal rate and regular rhythm. Heart sounds: No murmur heard. No friction rub. Pulmonary:      Effort: Pulmonary effort is normal. No respiratory distress. Breath sounds: Rhonchi present. No wheezing. Chest:      Chest wall: No tenderness. Abdominal:      General: Bowel sounds are normal.      Palpations: Abdomen is soft. Tenderness: There is no abdominal tenderness. Musculoskeletal:         General: Normal range of motion. Cervical back: Normal range of motion. Right lower leg: No edema. Left lower leg: No edema. Skin:     General: Skin is warm. Findings: No erythema. Neurological:      Mental Status: She is alert and oriented to person, place, and time.    Psychiatric:         Behavior: Behavior normal.       Lab and Diagnostic Study Results   Labs -     Recent Results (from the past 12 hour(s))   CBC WITH AUTOMATED DIFF    Collection Time: 11/09/22 11:00 AM   Result Value Ref Range    WBC 19.7 (H) 4.6 - 13.2 K/uL    RBC 4.54 4.20 - 5.30 M/uL    HGB 13.1 12.0 - 16.0 g/dL    HCT 38.4 35.0 - 45.0 %    MCV 84.6 78.0 - 100.0 FL    MCH 28.9 24.0 - 34.0 PG    MCHC 34.1 31.0 - 37.0 g/dL    RDW 16.6 (H) 11.6 - 14.5 %    PLATELET 220 503 - 554 K/uL    MPV 10.0 9.2 - 11.8 FL    NRBC 0.0 0.0  WBC    ABSOLUTE NRBC 0.00 0.00 - 0.01 K/uL    NEUTROPHILS 93 (H) 40 - 73 %    LYMPHOCYTES 4 (L) 21 - 52 %    MONOCYTES 3 3 - 10 %    EOSINOPHILS 0 0 - 5 %    BASOPHILS 0 0 - 2 %    IMMATURE GRANULOCYTES 0 %    ABS. NEUTROPHILS 18.3 (H) 1.8 - 8.0 K/UL    ABS. LYMPHOCYTES 0.8 (L) 0.9 - 3.6 K/UL    ABS. MONOCYTES 0.6 0.05 - 1.2 K/UL    ABS. EOSINOPHILS 0.0 0.0 - 0.4 K/UL    ABS. BASOPHILS 0.0 0.0 - 0.1 K/UL    ABS. IMM.  GRANS. 0.0 K/UL    DF Manual      RBC COMMENTS Normocytic, Normochromic     LACTIC ACID    Collection Time: 11/09/22 11:00 AM   Result Value Ref Range    Lactic acid 1.5 0.4 - 2.0 mmol/L   URINALYSIS W/ RFLX MICROSCOPIC    Collection Time: 11/09/22 11:20 AM   Result Value Ref Range    Color Yellow      Appearance Clear      Specific gravity 1.021 1.005 - 1.030      pH (UA) 6.0 5.0 - 8.0      Protein Negative Negative mg/dL    Glucose Negative Negative mg/dL    Ketone Negative Negative mg/dL    Bilirubin Negative Negative      Blood Small (A) Negative      Urobilinogen 1.0 0.2 - 1.0 EU/dL    Nitrites Negative Negative      Leukocyte Esterase Small (A) Negative     URINE MICROSCOPIC    Collection Time: 11/09/22 11:20 AM   Result Value Ref Range    WBC 0-4 0 - 4 /hpf    RBC 5-10 0 - 2 /hpf    Epithelial cells Few 0 - 20 /lpf    Bacteria 1+ (A) None /hpf   TROPONIN-HIGH SENSITIVITY    Collection Time: 11/09/22 12:45 PM   Result Value Ref Range    Troponin-High Sensitivity 9 0 - 54 ng/L   MAGNESIUM    Collection Time: 11/09/22 12:45 PM   Result Value Ref Range    Magnesium 1.9 1.6 - 2.6 mg/dL   METABOLIC PANEL, COMPREHENSIVE    Collection Time: 11/09/22 12:45 PM   Result Value Ref Range    Sodium 127 (L) 136 - 145 mmol/L    Potassium 4.1 3.5 - 5.5 mmol/L    Chloride 94 (L) 100 - 111 mmol/L    CO2 26 21 - 32 mmol/L    Anion gap 7 3.0 - 18.0 mmol/L    Glucose 133 (H) 74 - 99 mg/dL    BUN 32 (H) 7 - 18 mg/dL Creatinine 0.45 (L) 0.60 - 1.30 mg/dL    BUN/Creatinine ratio 71 (H) 12 - 20      eGFR >60 >60 ml/min/1.73m2    Calcium 9.2 8.5 - 10.1 mg/dL    Bilirubin, total 0.5 0.2 - 1.0 mg/dL    AST (SGOT) 16 10 - 38 U/L    ALT (SGPT) 44 13 - 56 U/L    Alk. phosphatase 76 45 - 117 U/L    Protein, total 6.5 6.4 - 8.2 g/dL    Albumin 3.1 (L) 3.4 - 5.0 g/dL    Globulin 3.4 2.0 - 4.0 g/dL    A-G Ratio 0.9 0.8 - 1.7     PROCALCITONIN    Collection Time: 11/09/22 12:45 PM   Result Value Ref Range    Procalcitonin 119.63 ng/mL   EKG, 12 LEAD, INITIAL    Collection Time: 11/09/22  1:30 PM   Result Value Ref Range    Ventricular Rate 97 BPM    Atrial Rate 91 BPM    P-R Interval 94 ms    QRS Duration 80 ms    Q-T Interval 341 ms    QTC Calculation (Bezet) 433 ms    Calculated P Axis 85 degrees    Calculated R Axis 63 degrees    Calculated T Axis 72 degrees    Diagnosis       Sinus rhythm  Atrial premature complexes  Short NC interval    Confirmed by BRIAN Malcolm ([id number]) on 11/9/2022 5:16:05 PM         Radiologic Studies -   @[email or web address]@  CT Results  (Last 48 hours)                 11/09/22 1314  CT CHEST WO CONT Final result    Impression:          1. Redemonstration of a large mediastinally invasive right upper lobe mass and   stigmata of SVC syndrome. > Within the limitations of an unenhanced study, overall size of this mass and   magnitude of local invasive features appear similar. 2. Small patchy foci of groundglass opacity seen throughout the lungs   bilaterally, favored infectious or inflammatory in etiology. 3. Asymmetric skin thickening throughout the right chest wall, potentially a   manifestation of external beam radiotherapy and/or central venous occlusion. 4. Trace right pleural effusion, improved from prior. Narrative:  EXAM: CT Chest        INDICATION: Patient with lung cancer, shortness of breath, SVC syndrome.        COMPARISON: Correlation performed several prior exams, most recently radiographs   9/14/2022; CT chest 10/22/2022. .       TECHNIQUE: Axial CT imaging from the thoracic inlet through the diaphragm   without intravenous contrast. Multiplanar reformations were generated. One or more dose reduction techniques were used on this CT: automated exposure   control, adjustment of the mAs and/or kVp according to patient size, and   iterative reconstruction techniques. The specific techniques used on this CT   exam have been documented in the patient's electronic medical record. Digital   Imaging and Communications in Medicine (DICOM) format image data are available   to nonaffiliated external healthcare facilities or entities on a secure, media   free, reciprocally searchable basis with patient authorization for at least a   12-month period after this study. _______________       FINDINGS:       LUNGS:      > Redemonstration of a large mediastinal invasive soft tissue mass which   appears to replace the majority of the right upper lobe, surrounding the   indwelling SVC stent, with obliteration of normal size and mediastinal fat   planes. Exact measurements are limited by the absence of contrast with overall   size estimated at approximately 7.2 x 7.0 cm.      > Additional areas of subsegmental atelectasis are seen within the superior   portion of the right middle lobe. > Patchy groundglass opacities are seen within the bilateral lower lobes and   left upper lobe.      > No additional dense pneumonic consolidation. PLEURA: Trace right effusion. No left-sided pleural abnormality. AIRWAY: Bronchial wall thickening and right upper lobe bronchial truncation, as   previously. MEDIASTINUM: Unenhanced appearance of the thoracic aorta demonstrates normal   course and caliber. Normal cardiac size. Small pericardial effusion. SVC stent   in situ, narrowed superiorly, unchanged from prior. LYMPH NODES: No supraclavicular or axillary lymph node enlargement. Mediastinal   invasion and occupation of the right paratracheal hiren station, as previously. UPPER ABDOMEN: No acute abnormality. OSSEOUS STRUCTURES: No acute or aggressive appearing osseous abnormality. 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 OTHER: Asymmetric skin thickening throughout the right chest wall soft tissues,   to include the right breast again noted. _______________                 CXR Results  (Last 48 hours)      None            Medical Decision Making and ED Course   Differential Diagnosis & Medical Decision Making Provider Note:   Hypothyroid, anemia, depression, RENÉ, infections such as tuberculosis hepatitis endocarditis HIV or Lyme disease, diabetes mellitus, congestive heart failure COPD, chronic renal failure, cancer, alcoholism, hypercalcemia, drug such as sedatives and beta-blockers, chronic fatigue syndrome, somatization, fibromyalgia, mononucleosis, autoimmune disease, adrenal insufficiency, iron deficiency, disuse of muscles, anemia, malnutrition, electrolyte abnormalities such as hypercalcemia hypokalemia hyponatremia, endocrine disease such as hypothyroid diabetes Cushing's, chronic lung disease or heart disease, malingering, stroke, space-occupying brain tumor spinal cord lesion multiple sclerosis, anterior horn cell disease, motor neuro disease, nerve plexus, peripheral neuropathy, neuromuscular junction such as myasthenia gravis, myopathy, aortic dissection, periodic paralysis, thyrotoxic.     - I am the first provider for this patient. I reviewed the vital signs, available nursing notes, past medical history, past surgical history, family history and social history. The patients presenting problems have been discussed, and they are in agreement with the care plan formulated and outlined with them. I have encouraged them to ask questions as they arise throughout their visit. Vital Signs-Reviewed the patient's vital signs.   Patient Vitals for the past 12 hrs:   Temp Pulse Resp BP SpO2   11/09/22 1730 -- 84 16 120/79 96 %   11/09/22 1053 98 °F (36.7 °C) 86 16 101/62 96 %       ED Course:   Alert, afebrile, VSS;  WBC 19.7, , UA negative, lactic acid 1.5, CT with right upper lobe mass and patchy foci of groundglass opacities bilaterally. States to weakness and cannot get up to care for herself. [facility]. 4:30 PM  Case discussed with NP Davian; suggests transfer to [facility]    6:50 PM  Case discussed with NP Claire Teixeira for Dr Dorene Riley; who accepts pt in transfer to [facility]. Procedures       Disposition   Disposition: TRANSFER TO [facility]    Diagnosis/Clinical Impression     Clinical Impression:   1. Leukocytosis, unspecified type    2. Generalized weakness        Attestations: Vinnie Berrios MD, am the primary clinician of record. Please note that this dictation was completed with [employer], the [employer] voice recognition software. Quite often unanticipated grammatical, syntax, homophones, and other interpretive errors are inadvertently transcribed by the computer software. Please disregard these errors. Please excuse any errors that have escaped final proofreading. Thank you.

## 2022-11-09 NOTE — TELEPHONE ENCOUNTER
Patient called stating she has been getting weaker and had to receive help yesterday getting up from the toilet. She would like to be advised on what she can do.         Patients call back info;  [phone]

## 2022-11-10 ENCOUNTER — PATIENT OUTREACH (OUTPATIENT)
Dept: CASE MANAGEMENT | Age: 67
End: 2022-11-10

## 2022-11-10 NOTE — PROGRESS NOTES
Discovered patient was admitted to [facility] on 11-9-22. Will place patient on hold for ACM and will await discharge and subsequent follow up as needed.

## 2022-11-16 ENCOUNTER — PATIENT OUTREACH (OUTPATIENT)
Dept: CASE MANAGEMENT | Age: 67
End: 2022-11-16

## 2022-11-16 NOTE — PROGRESS NOTES
Patient remains in [facility] waiting transfer to [address] [place] [address]. Will follow up as needed.

## 2022-11-18 ENCOUNTER — PATIENT OUTREACH (OUTPATIENT)
Dept: CASE MANAGEMENT | Age: 67
End: 2022-11-18

## 2022-11-18 NOTE — PROGRESS NOTES
Care Transitions Initial Call    Call within 2 business days of discharge: No discharge to SNF    Patient: Esther Wilson Patient : 1955 MRN: [id number]    Transition of care outreach postponed for 14 days due to patient's discharge to SNF.   [address] follow up appointment(s):   Future Appointments   Date Time Provider Yessy Fatima   2022  9:00 AM Inocente Dean MD [facility] BS AMB   2022 10:30 AM Gerhard Zafar MD [facility] BS AMB

## 2022-11-28 ENCOUNTER — TELEPHONE (OUTPATIENT)
Dept: FAMILY MEDICINE CLINIC | Age: 67
End: 2022-11-28

## 2022-11-28 NOTE — TELEPHONE ENCOUNTER
Pt called needing to cancel appointment, she states she is at [facility] for therapy and just wanted to let yo know.      Pts call back info;  [phone]

## 2022-12-01 ENCOUNTER — PATIENT OUTREACH (OUTPATIENT)
Dept: CASE MANAGEMENT | Age: 67
End: 2022-12-01

## 2022-12-01 NOTE — PROGRESS NOTES
CTN reached Pt. On phone and Pt. Verified her identity. Pt. Reported that she is still admitted to SNF with tentative discharge date on 12/8/22. No questions, concerns and/or needs verbalized by Pt. At this time.

## 2022-12-13 ENCOUNTER — PATIENT OUTREACH (OUTPATIENT)
Dept: CASE MANAGEMENT | Age: 67
End: 2022-12-13

## 2022-12-13 NOTE — PROGRESS NOTES
Care Transitions Initial Call     Pt. Verified her identity. CTN introduced self, role and reason for call. Pt. Reported that she is with his son and hospice is following. Offer support and assistance. Pt. States that she is okay right now. No questions, concerns and/or needs at this time as per Pt. CTN reminded Pt. To call Hospice for any questions, concerns and/or needs. Pt. States she knows. No transition of care outreach indicated due to patient is under hospice care. This episode is closed and resolved.      [facility] follow up appointment(s):   Future Appointments   Date Time Provider Yessy Fatima   1/5/2023  2:45 PM Karly Dean MD [facility] BS AMB

## 2022-12-21 ENCOUNTER — PATIENT OUTREACH (OUTPATIENT)
Dept: CASE MANAGEMENT | Age: 67
End: 2022-12-21

## 2024-02-12 NOTE — TELEPHONE ENCOUNTER
Order for bilateral breast ultrasound placed.
Patient was seen yesterday, need order for b/l ultra sound to go with mammogram.
medical device attached to pt/Other

## (undated) DEVICE — APPLICATOR FBR TIP L6IN COT TIP WOOD SHFT SWAB 2000 PER CA

## (undated) DEVICE — FCPS BIOP PULM RAD JAW 100CML -- BX/10 M00515180

## (undated) DEVICE — CONMED SCOPE SAVER BITE BLOCK, 20X27 MM: Brand: SCOPE SAVER

## (undated) DEVICE — AIRLIFE™ ADULT OXYGEN MASK VINYL, UNDER-THE-CHIN STYLE, MEDIUM CONCENTRATION MASK WITH 7 FEET (2.1 M) CRUSH-RESISTANT OXYGEN TUBING: Brand: AIRLIFE™

## (undated) DEVICE — SET ADMIN L104IN 20 GTT GRAV RLER CLMP SMRT SITE NDL FREE

## (undated) DEVICE — CATHETER SUCT TR FL TIP 14FR W/ O CTRL

## (undated) DEVICE — NEEDLE ASPIR 22GA L700MM US GUID TREAT DST END FOR

## (undated) DEVICE — SET EXTN PRIMING VOL 18ML 4 W STPCOCK ANES

## (undated) DEVICE — Device: Brand: DEFENDO VALVE AND CONNECTOR KIT

## (undated) DEVICE — FLEX ADVANTAGE 3000CC: Brand: FLEX ADVANTAGE

## (undated) DEVICE — MASK SURG REG ORNG LEV 3 SFTY SEAL 4 LAYR SFT INNR LINING

## (undated) DEVICE — SOL IRR STRL H2O 1000ML BTL --

## (undated) DEVICE — WANG TRANSBRONCHIAL HISTOLOGY NEEDLE FOR CENTRAL REGION, 1.9 MM X 130 CM: Brand: WANG

## (undated) DEVICE — ENDOGATOR TUBING FOR BOSTON SCIENTIFIC ENDOSTAT II PUMP, OLYMPUS OFP PUMP OR ENDO STRATUS PUMP: Brand: ENDOGATOR

## (undated) DEVICE — REAG CYTO FIX 4OZ PMP SPRY --

## (undated) DEVICE — SOL IRR STRL H2O 500ML STRL --

## (undated) DEVICE — SINGLE USE SUCTION VALVE MAJ-209: Brand: SINGLE USE SUCTION VALVE (STERILE)

## (undated) DEVICE — 1860 HEALTH CARE N95 MASK, 20EACH/BOX  6 BX/C: Brand: 3M™

## (undated) DEVICE — SLEEVE COMPR STD 12 IN FOR 165IN CALF COMFORT VENODYNE SYS

## (undated) DEVICE — NEBULIZER,KIT,T-MOUTHPIECE,6"RESER,7'TUB: Brand: MEDLINE

## (undated) DEVICE — MAILER SLDE MICSCP 2 PLC --

## (undated) DEVICE — NDL BX EXCELON 21GX130CM --

## (undated) DEVICE — GOWN,AURORA,FABRIC-REINFORCED,X-LARGE: Brand: MEDLINE

## (undated) DEVICE — KIT COLON W/ 1.1OZ LUB AND 2 END

## (undated) DEVICE — SYR 10ML SLIP TIP 1/5ML GRAD --

## (undated) DEVICE — BITE BLOCK ENDOSCP UNIV AD 6 TO 9.4 MM

## (undated) DEVICE — TRAP,MUCUS SPECIMEN, 80CC: Brand: MEDLINE

## (undated) DEVICE — Device: Brand: BALLOON

## (undated) DEVICE — ADAPTER TBNG DIA15MM SWVL FBROPT BRONCHSCP TERM 2 AXIS PEEP

## (undated) DEVICE — MEDI-VAC NON-CONDUCTIVE SUCTION TUBING: Brand: CARDINAL HEALTH

## (undated) DEVICE — SINGLE USE BIOPSY VALVE MAJ-210: Brand: SINGLE USE BIOPSY VALVE (STERILE)

## (undated) DEVICE — TUBING INSUFFLATION CAP W/ EXT CARBON DIOX ENDO SMARTCAP

## (undated) DEVICE — 1860S HEALTH CARE RESPIRATOR N95 120EA/C: Brand: 3M™

## (undated) DEVICE — BASIN EMESIS 500CC ROSE 250/CS 60/PLT: Brand: MEDEGEN MEDICAL PRODUCTS, LLC

## (undated) DEVICE — BE 105-8 BRONCHOSCOPE SWIVEL - 15MM ID/22MM OD (PATIENT PORT) X15MM OD (EQUIPMENT PORT). REUSABLE.  FITS COMPONENTS OF ADULT VENTILATOR CIRCUITS.  MOLDED OF POLYETHERIMIDE. INCLUDES TWO SILICONE RUBBER CAPS; ONE CAP ALLOWS FOR THE USE OF A SUCTIONING CATHETER WHILE THE OTHER CAP ALLOWS FOR THE USE OF A FIBER-OPTIC BRONCHOSCOPE WITHOUT SIGNIFICANT LOSS OF PEEP.: Brand: BE 105-8 BRONCHOSCOPE SWIVEL

## (undated) DEVICE — GOWN ISOL IMPERV UNIV, DISP, OPEN BACK, BLUE --

## (undated) DEVICE — CONTAINER PREFIL FRMLN 40ML --

## (undated) DEVICE — STERILE POLYISOPRENE POWDER-FREE SURGICAL GLOVES: Brand: PROTEXIS

## (undated) DEVICE — AIRLIFE™ NASAL OXYGEN CANNULA CURVED, FLARED TIP WITH 14 FOOT (4.3 M) CRUSH-RESISTANT TUBING, OVER-THE-EAR STYLE: Brand: AIRLIFE™

## (undated) DEVICE — MEDI-VAC SUCTION HIGH CAPACITY: Brand: CARDINAL HEALTH

## (undated) DEVICE — BRUSH CYTO BRONCHSCP 1.5/140MM -- CELLEBRITY

## (undated) DEVICE — BLADE, TONGUE, 6", STERILE: Brand: MEDLINE

## (undated) DEVICE — TUBING, SUCTION, 9/32" X 10', STRAIGHT: Brand: MEDLINE

## (undated) DEVICE — DRAPE TWL SURG 16X26IN BLU ORB04] ALLCARE INC]

## (undated) DEVICE — SLIDE MICRO PLAIN PRECLEAND --